# Patient Record
Sex: FEMALE | Race: WHITE | NOT HISPANIC OR LATINO | Employment: OTHER | URBAN - METROPOLITAN AREA
[De-identification: names, ages, dates, MRNs, and addresses within clinical notes are randomized per-mention and may not be internally consistent; named-entity substitution may affect disease eponyms.]

---

## 2017-05-17 ENCOUNTER — CONVERSION ENCOUNTER (OUTPATIENT)
Dept: MAMMOGRAPHY | Facility: CLINIC | Age: 62
End: 2017-05-17

## 2018-09-04 PROBLEM — I82.5Z1 CHRONIC DEEP VEIN THROMBOSIS (DVT) OF DISTAL VEIN OF RIGHT LOWER EXTREMITY (HCC): Status: ACTIVE | Noted: 2018-09-04

## 2018-09-04 RX ORDER — ASPIRIN 325 MG
1 TABLET ORAL DAILY
COMMUNITY
Start: 2016-06-06 | End: 2018-09-05

## 2018-09-04 RX ORDER — CARISOPRODOL 350 MG/1
TABLET ORAL
COMMUNITY
End: 2018-09-05

## 2018-09-04 RX ORDER — METHADONE HYDROCHLORIDE 10 MG/1
TABLET ORAL EVERY 6 HOURS
COMMUNITY
End: 2018-09-05

## 2018-09-04 RX ORDER — HYDROMORPHONE HYDROCHLORIDE 4 MG/1
TABLET ORAL
COMMUNITY

## 2018-09-05 ENCOUNTER — OFFICE VISIT (OUTPATIENT)
Dept: FAMILY MEDICINE CLINIC | Facility: CLINIC | Age: 63
End: 2018-09-05
Payer: COMMERCIAL

## 2018-09-05 VITALS
HEART RATE: 87 BPM | SYSTOLIC BLOOD PRESSURE: 118 MMHG | BODY MASS INDEX: 22.66 KG/M2 | OXYGEN SATURATION: 92 % | RESPIRATION RATE: 12 BRPM | WEIGHT: 141 LBS | HEIGHT: 66 IN | DIASTOLIC BLOOD PRESSURE: 80 MMHG | TEMPERATURE: 96.2 F

## 2018-09-05 DIAGNOSIS — Z00.00 ANNUAL PHYSICAL EXAM: Primary | ICD-10-CM

## 2018-09-05 DIAGNOSIS — Z96.643 HISTORY OF TOTAL REPLACEMENT OF BOTH HIP JOINTS: ICD-10-CM

## 2018-09-05 DIAGNOSIS — N63.0 BREAST LUMP IN LOWER OUTER QUADRANT: ICD-10-CM

## 2018-09-05 PROCEDURE — 99396 PREV VISIT EST AGE 40-64: CPT | Performed by: FAMILY MEDICINE

## 2018-09-05 RX ORDER — ASPIRIN 81 MG/1
81 TABLET, CHEWABLE ORAL DAILY
COMMUNITY
End: 2018-11-02 | Stop reason: ALTCHOICE

## 2018-09-05 RX ORDER — MORPHINE SULFATE 30 MG/1
30 TABLET ORAL EVERY 8 HOURS PRN
COMMUNITY
End: 2019-01-31 | Stop reason: CLARIF

## 2018-09-05 NOTE — PROGRESS NOTES
Assessment/Plan:          Diagnoses and all orders for this visit:    Annual physical exam  Comments:  per patient, Will will authorize shingles vaccine if PCP calls, Will will authorize cologuard if PCP calls  refuses repeat colonoscopy and pap smear  Orders:  -     Comprehensive metabolic panel; Future  -     Lipid panel; Future  -     TSH, 3rd generation with Free T4 reflex; Future  -     CBC and differential; Future    Breast lump in lower outer quadrant  Comments:  mother had breast cancer, sister negative for the gene  Orders:  -     Mammo screening bilateral w cad; Future  -     US breast left limited (diagnostic); Future    History of total replacement of both hip joints  Comments:  both done in late 2016  f/u [facility]    Other orders  -     Discontinue: aspirin 325 mg tablet; Take 1 tablet by mouth daily  -     HYDROmorphone (DILAUDID) 4 mg tablet; take 1 tablet (4MG)  by oral route  every 4 - 6 hours as needed  -     Discontinue: methadone (DOLOPHINE) 10 mg tablet; every 6 (six) hours,  -     Discontinue: carisoprodol (SOMA) 350 mg tablet; take 1 tablet (350MG)  by oral route  every bedtime and at bedtime  -     morphine (MSIR) 30 MG tablet; Take 30 mg by mouth every 8 (eight) hours as needed  -     aspirin 81 mg chewable tablet; Chew 81 mg daily          Subjective:      Patient ID: Josleito Mahoney is a 61 y o  female  Pain underneath left breast in rib area for the past one week  Mother had breast cancer  Sister took the gene test and it was negative  Had bilateral hip replacements in late 2016  Finished PT  Still having problems standing  Can walk pretty well  Has been using friend's pool as aqua therapy  Home aide comes 4 times per week from [facility]  Still goes to [facility] for follow-up care  [place] would authorize shingles vaccine if I called and they send it to [facility] so it can be administered here  Had colonoscopy years ago   Does not want another colonoscopy  She mentioned cologuard which she told me [place] would authorize  Does not want pap smear  Has not been sexually active in over 15 years        The following portions of the patient's history were reviewed and updated as appropriate: allergies, current medications, past family history, past medical history, past social history, past surgical history and problem list     Review of Systems   Constitutional: Negative for chills, fatigue, fever and unexpected weight change  HENT: Negative for congestion, ear pain, rhinorrhea and sore throat  Eyes: Negative for pain and visual disturbance  Respiratory: Negative for cough, chest tightness and shortness of breath  Cardiovascular: Negative for chest pain, palpitations and leg swelling  Gastrointestinal: Negative for abdominal pain, constipation, diarrhea, nausea and vomiting  Genitourinary: Negative for difficulty urinating, dysuria and urgency  Left lower breast tenderness   Musculoskeletal: Positive for arthralgias, back pain, gait problem and joint swelling  Skin: Negative for rash  Neurological: Negative for dizziness, numbness and headaches  Psychiatric/Behavioral: Negative for behavioral problems and suicidal ideas  The patient is not nervous/anxious  Objective:      /80 (BP Location: Right arm, Patient Position: Sitting, Cuff Size: Standard)   Pulse 87   Temp (!) 96 2 °F (35 7 °C) (Temporal)   Resp 12   Ht 5' 6" (1 676 m)   Wt 64 kg (141 lb)   SpO2 92%   BMI 22 76 kg/m²          Physical Exam   Constitutional: She is oriented to person, place, and time  She appears well-developed and well-nourished  HENT:   Head: Normocephalic and atraumatic  Right Ear: External ear normal    Left Ear: External ear normal    Nose: Nose normal    Mouth/Throat: Oropharynx is clear and moist    Eyes: Conjunctivae and EOM are normal  Pupils are equal, round, and reactive to light  Neck: Normal range of motion  Neck supple  Cardiovascular: Normal rate, regular rhythm, normal heart sounds and intact distal pulses  Pulmonary/Chest: Effort normal and breath sounds normal    Abdominal: Soft  Bowel sounds are normal    Genitourinary: There is breast tenderness  Genitourinary Comments: Left lower outer quadrant   Musculoskeletal:        Right knee: She exhibits decreased range of motion  Tenderness found  Left knee: She exhibits decreased range of motion  Tenderness found  Lumbar back: She exhibits decreased range of motion, tenderness, deformity and spasm  Back:    Neurological: She is alert and oriented to person, place, and time  Skin: Skin is warm  Psychiatric: She has a normal mood and affect   Her behavior is normal

## 2018-09-06 ENCOUNTER — TELEPHONE (OUTPATIENT)
Dept: FAMILY MEDICINE CLINIC | Facility: CLINIC | Age: 63
End: 2018-09-06

## 2018-09-06 ENCOUNTER — TRANSCRIBE ORDERS (OUTPATIENT)
Dept: ADMINISTRATIVE | Facility: HOSPITAL | Age: 63
End: 2018-09-06

## 2018-09-06 DIAGNOSIS — N64.4 BREAST TENDERNESS: Primary | ICD-10-CM

## 2018-09-06 DIAGNOSIS — N63.0 LUMP OR MASS IN BREAST: Primary | ICD-10-CM

## 2018-09-13 ENCOUNTER — HOSPITAL ENCOUNTER (OUTPATIENT)
Dept: MAMMOGRAPHY | Facility: CLINIC | Age: 63
Discharge: HOME | End: 2018-09-13
Payer: COMMERCIAL

## 2018-09-13 ENCOUNTER — HOSPITAL ENCOUNTER (OUTPATIENT)
Dept: ULTRASOUND IMAGING | Facility: HOSPITAL | Age: 63
Discharge: HOME | End: 2018-09-13
Attending: FAMILY MEDICINE
Payer: COMMERCIAL

## 2018-09-13 ENCOUNTER — TELEPHONE (OUTPATIENT)
Dept: FAMILY MEDICINE CLINIC | Facility: CLINIC | Age: 63
End: 2018-09-13

## 2018-09-13 DIAGNOSIS — N63.0 LUMP OR MASS IN BREAST: ICD-10-CM

## 2018-09-13 DIAGNOSIS — N63.0 BREAST LUMP IN LOWER OUTER QUADRANT: ICD-10-CM

## 2018-09-13 PROCEDURE — 77066 DX MAMMO INCL CAD BI: CPT

## 2018-09-13 PROCEDURE — 76642 ULTRASOUND BREAST LIMITED: CPT

## 2018-09-13 NOTE — PROGRESS NOTES
Dr En Angela met with pt at [facility] regarding recommendation for;      _____ RIGHT ___x___LEFT      __x___Ultrasound guided  ______Stereotactic  Breast biopsy  __x___Verbalized understanding  Blood thinners:  __x___yes _____no   (ASA 81 mg)    Date stopped: ____n/a_______    Biopsy teaching sheet reviewed:  ____x___yes ______no    Chucho Thoreau to pt via phone and reviewed biopsy information teaching  Pt verbalized understanding of such and all questions answered at this time

## 2018-09-19 ENCOUNTER — HOSPITAL ENCOUNTER (OUTPATIENT)
Dept: MAMMOGRAPHY | Facility: CLINIC | Age: 63
Discharge: HOME | End: 2018-09-19

## 2018-09-19 ENCOUNTER — HOSPITAL ENCOUNTER (OUTPATIENT)
Dept: ULTRASOUND IMAGING | Facility: CLINIC | Age: 63
Discharge: HOME | End: 2018-09-19
Payer: COMMERCIAL

## 2018-09-19 ENCOUNTER — HOSPITAL ENCOUNTER (OUTPATIENT)
Dept: ULTRASOUND IMAGING | Facility: CLINIC | Age: 63
Discharge: HOME | End: 2018-09-19
Admitting: RADIOLOGY
Payer: COMMERCIAL

## 2018-09-19 VITALS — HEART RATE: 88 BPM | DIASTOLIC BLOOD PRESSURE: 92 MMHG | SYSTOLIC BLOOD PRESSURE: 158 MMHG

## 2018-09-19 DIAGNOSIS — R92.8 ABNORMAL MAMMOGRAM: ICD-10-CM

## 2018-09-19 PROCEDURE — 88341 IMHCHEM/IMCYTCHM EA ADD ANTB: CPT | Performed by: PATHOLOGY

## 2018-09-19 PROCEDURE — 19083 BX BREAST 1ST LESION US IMAG: CPT

## 2018-09-19 PROCEDURE — 88305 TISSUE EXAM BY PATHOLOGIST: CPT | Performed by: PATHOLOGY

## 2018-09-19 PROCEDURE — 88342 IMHCHEM/IMCYTCHM 1ST ANTB: CPT | Performed by: PATHOLOGY

## 2018-09-19 PROCEDURE — 19084 BX BREAST ADD LESION US IMAG: CPT

## 2018-09-19 PROCEDURE — 88361 TUMOR IMMUNOHISTOCHEM/COMPUT: CPT | Performed by: PATHOLOGY

## 2018-09-19 RX ORDER — LIDOCAINE HYDROCHLORIDE 10 MG/ML
5 INJECTION, SOLUTION INFILTRATION; PERINEURAL ONCE
Status: COMPLETED | OUTPATIENT
Start: 2018-09-19 | End: 2018-09-19

## 2018-09-19 RX ORDER — LIDOCAINE HYDROCHLORIDE 10 MG/ML
5 INJECTION, SOLUTION EPIDURAL; INFILTRATION; INTRACAUDAL; PERINEURAL ONCE
Status: COMPLETED | OUTPATIENT
Start: 2018-09-19 | End: 2018-09-19

## 2018-09-19 RX ADMIN — LIDOCAINE HYDROCHLORIDE 5 ML: 10 INJECTION, SOLUTION INFILTRATION; PERINEURAL at 12:21

## 2018-09-19 RX ADMIN — LIDOCAINE HYDROCHLORIDE 5 ML: 10 INJECTION, SOLUTION EPIDURAL; INFILTRATION; INTRACAUDAL; PERINEURAL at 12:45

## 2018-09-19 RX ADMIN — LIDOCAINE HYDROCHLORIDE 5 ML: 10 INJECTION, SOLUTION EPIDURAL; INFILTRATION; INTRACAUDAL; PERINEURAL at 12:34

## 2018-09-19 RX ADMIN — LIDOCAINE HYDROCHLORIDE 5 ML: 10 INJECTION, SOLUTION INFILTRATION; PERINEURAL at 12:34

## 2018-09-19 NOTE — DISCHARGE INSTR - OTHER ORDERS
POST LARGE CORE BREAST BIOPSY PATIENT INFORMATION      1  Place an ice pack inside your bra over the top of the dressing every hour for 20 minutes (20 minutes on, 60 minutes off)  Do this until bedtime  2  Do not shower or bathe until the following morning  3  You may bathe your breast carefully with the steri-strips in place  Be careful    Not to loosen them  The steri-strips will fall off in 3-5 days  4  You may have mild discomfort, and you may have some bruising where the   Needle entered the skin  This should clear within 5-7 days  5  If you need medicine for discomfort, take acetaminophen products such as   Tylenol  You may also take Advil or Motrin products  6  Do not participate in strenuous activities such as-tennis, aerobics, skiing,  Weight lifting, etc  for 24 hours  Refrain from swimming/soaking for 72 hours  7  Wearing a bra for sleeping may be more comfortable for the first 24-48 hours  8  Watch for continued bleeding, pain or fever over 101; please call with any questions or concerns  For procedures done at the [facility]  [facility] [address] call:  Griffin Lopes RN at [phone]  Zoltan Lujan RN at [phone]                    *After 4 PM call the Interventional Radiology Department                    [phone] and ask to speak with the nurse on call  For procedures done at the [address] [place] [address] call:         Taylor Barthel RN at   *After 4 PM call the Interventional Radiology Department   [phone] and ask to speak with the nurse on call  For procedures done at [address] [place] [address] call: The Radiology Nurse at [phone]  *After 4 PM call your physician, or go to the Emergency Department  9          The final results of your biopsy are usually available within one week

## 2018-09-19 NOTE — PROGRESS NOTES
Procedure type: (1st site)    __x___ultrasound guided _____stereotactic    Breast:    __x___Left _____Right    Location: 4:00 6m from nipple    Needle: 12g Marquee    # of passes: 4 (specimen A)    Clip: [place]    Performed by: Dr Trever Rojo held for 5 minutes by: Dr Trevin Middleton Strips:    __x___yes _____no    Band aid:    __x___yes_____no    Tape and guaze:    _____yes __x___no    Tolerated procedure:    __x___yes _____no (pt had pain during 1st site; Dr Oswald Medina gave pt more lidocaine and pt tolerated remainder of 1st site well)      Procedure type: (2nd site)    __x___ultrasound guided _____stereotactic    Breast:    __x___Left _____Right    Location: 5:00 5cm from nipple    Needle: 12g Marquee    # of passes: 4 (specimen B)    Clip: Celeromark    Performed by: by Dr Trever Rojo held for 5 minutes by: Dr Trevin Middleton Strips:    __x___yes _____no    Band aid:    __x___yes_____no    Tape and guaze:    _____yes __x___no    Tolerated procedure:    __x___yes _____no (pt had pain during 2nd site; Dr Oswald Medina gave pt more lidocaine and pt tolerated remainder of 2nd site well)      Procedure type: (3rd site)    __x___ultrasound guided _____stereotactic    Breast:    __x___Left _____Right    Location: 6:00 6cm from nipple    Needle: 12g Marquee    # of passes: 4 (specimen C)    Clip: Ultraclip-ribbon    Performed by: Dr Trever Rojo held for 5 minutes by: Dr Trevin Middleton Strips:    __x___yes _____no    Band aid:    __x___yes_____no    Tape and guaze:    _____yes __x___no    Tolerated procedure:    __x___yes _____no

## 2018-09-19 NOTE — PROGRESS NOTES
Ice pack given:    __x___yes _____no    Discharge instructions signed by patient:    __x___yes _____no    Discharge instructions given to patient:    ___x__yes _____no    Discharged via:    __x___amulatory (with walker)    _____wheelchair    _____stretcher    Stable on discharge:    __x___yes ____no

## 2018-09-19 NOTE — PROGRESS NOTES
Patient arrived via:    __x___ambulatory (with walker)    _____wheelchair    _____stretcher      Domestic violence screen    __x____negative______positive    Breast Implants:    _______yes ____x____no

## 2018-09-21 ENCOUNTER — TELEPHONE (OUTPATIENT)
Dept: MAMMOGRAPHY | Facility: CLINIC | Age: 63
End: 2018-09-21

## 2018-10-17 ENCOUNTER — TRANSCRIBE ORDERS (OUTPATIENT)
Dept: ADMINISTRATIVE | Facility: HOSPITAL | Age: 63
End: 2018-10-17

## 2018-10-17 DIAGNOSIS — C50.912 MALIGNANT NEOPLASM OF LEFT FEMALE BREAST, UNSPECIFIED ESTROGEN RECEPTOR STATUS, UNSPECIFIED SITE OF BREAST (HCC): Primary | ICD-10-CM

## 2018-10-22 PROBLEM — Z17.0 MALIGNANT NEOPLASM OF LEFT BREAST IN FEMALE, ESTROGEN RECEPTOR POSITIVE (HCC): Status: ACTIVE | Noted: 2018-09-19

## 2018-10-22 PROBLEM — C50.912 MALIGNANT NEOPLASM OF LEFT BREAST IN FEMALE, ESTROGEN RECEPTOR POSITIVE (HCC): Status: ACTIVE | Noted: 2018-09-19

## 2018-10-24 ENCOUNTER — HOSPITAL ENCOUNTER (OUTPATIENT)
Dept: NUCLEAR MEDICINE | Facility: HOSPITAL | Age: 63
Discharge: HOME | End: 2018-10-24
Attending: SURGERY
Payer: COMMERCIAL

## 2018-10-24 DIAGNOSIS — C50.912 MALIGNANT NEOPLASM OF LEFT FEMALE BREAST, UNSPECIFIED ESTROGEN RECEPTOR STATUS, UNSPECIFIED SITE OF BREAST (HCC): ICD-10-CM

## 2018-10-24 LAB — GLUCOSE SERPL-MCNC: 95 MG/DL (ref 70–99)

## 2018-10-24 PROCEDURE — 82948 REAGENT STRIP/BLOOD GLUCOSE: CPT

## 2018-10-24 PROCEDURE — A9552 F18 FDG: HCPCS

## 2018-10-24 PROCEDURE — 78815 PET IMAGE W/CT SKULL-THIGH: CPT

## 2018-11-02 ENCOUNTER — CLINICAL SUPPORT (OUTPATIENT)
Dept: RADIATION ONCOLOGY | Facility: CLINIC | Age: 63
End: 2018-11-02
Payer: COMMERCIAL

## 2018-11-02 ENCOUNTER — RADIATION ONCOLOGY CONSULT (OUTPATIENT)
Dept: RADIATION ONCOLOGY | Facility: CLINIC | Age: 63
End: 2018-11-02
Attending: RADIOLOGY
Payer: COMMERCIAL

## 2018-11-02 VITALS
TEMPERATURE: 98.7 F | OXYGEN SATURATION: 97 % | HEIGHT: 66 IN | BODY MASS INDEX: 22.79 KG/M2 | HEART RATE: 73 BPM | DIASTOLIC BLOOD PRESSURE: 84 MMHG | RESPIRATION RATE: 16 BRPM | WEIGHT: 141.8 LBS | SYSTOLIC BLOOD PRESSURE: 140 MMHG

## 2018-11-02 DIAGNOSIS — Z17.0 MALIGNANT NEOPLASM OF LEFT BREAST IN FEMALE, ESTROGEN RECEPTOR POSITIVE, UNSPECIFIED SITE OF BREAST (HCC): Primary | ICD-10-CM

## 2018-11-02 DIAGNOSIS — C50.912 MALIGNANT NEOPLASM OF LEFT BREAST IN FEMALE, ESTROGEN RECEPTOR POSITIVE, UNSPECIFIED SITE OF BREAST (HCC): Primary | ICD-10-CM

## 2018-11-02 PROCEDURE — 99215 OFFICE O/P EST HI 40 MIN: CPT | Performed by: RADIOLOGY

## 2018-11-02 RX ORDER — LORAZEPAM 0.5 MG/1
0.5 TABLET ORAL EVERY 8 HOURS PRN
COMMUNITY
End: 2019-01-28 | Stop reason: DRUGHIGH

## 2018-11-02 RX ORDER — ASPIRIN 325 MG
325 TABLET ORAL AS NEEDED
COMMUNITY

## 2018-11-02 NOTE — PROGRESS NOTES
Consultation - Radiation Oncology     XOC:[id number] : 1955  Encounter: [id number]  Patient Information: Viviana Sherwood    Stage IIIA invasive left breast carcinoma  CHIEF COMPLAINT  Chief Complaint   Patient presents with    Breast Cancer    Consult     Cancer Staging  No matching staging information was found for the patient  History of Present Illness   Viviana Sherwood is a 61y o  year old female who presents with a history feeling a mass in her left breast    Bilateral diagnostic mammogram did not show an underlying mass and the ultrasound revealed small hypoechoic area suspicious enough to warrant ultrasound-guided biopsy  Ultrasound-guided biopsy was performed  from 4, 5 and 6 o'clock all revealing of invasive lobular carcinoma  Patient underwent on  left-sided nipple sparing mastectomy, sentinel lymph node biopsy and axillary lymph node dissection  Final pathologic diagnosis was a grade 1 invasive lobular carcinoma, classic type, tumor size greater than 5 cm and was at or within 1 mm of the soft tissue margins in the upper outer and lower outer margins  There was extensive LCIS  Lymphovascular invasion was seen  3/13 lymph nodes positive for metastatic carcinoma  These were macrometastases more than 2 mm  ER more than 90% positive, [place] 90% positive and HER2 Brennan negative  She had a chest wall expander for reconstruction later  Historical Information      Malignant neoplasm of left breast in female, estrogen receptor positive ([facility] [address] )    2018 Initial Diagnosis     Malignant neoplasm of left breast in female, estrogen receptor positive ([facility] [address] )       2018 Biopsy     Left breast biopsy x3:  Invasive lobular carcinoma, grade 1/3  ER >90%  W[place] 90%  HER2 negative         10/9/2018 Surgery     Left sided nipple sparing total mastectomy  Left sided sentinel node mapping and deep biopsy with completion total axillary lymph node dissection      A   Left breast, mastectomy:  - Infiltrating lobular carcinoma, classic type, grade 1/3  - Tumor size:  Estimated to be greater than 5 cm in greatest dimension  - Tumor is seen at or within 1 mm of soft tissue margins, particularly upper outer and lower outer margins   - Extensive lobular carcinoma in situ is also seen  - Lymphatic/vascular invasion is seen  - See synoptic report for further details     B  Left axillary lymph nodes:  - Nine lymph nodes identified showing no metastatic tumor      A  [place] lymph node #1:  - Metastatic carcinoma identified in 2 of 4 lymph nodes with metastatic foci greater 2 mm and without extracapsular extension     B  [place] lymph node #2:  - One lymph node identified showing metastatic carcinoma the metastasis being greater than 2 mm and without extracapsular extension    C  Retroareolar tissue:    - Benign breast tissue showing no atypia or malignancy    - Dr Zoe Snellen  - Dr Winnie Reilly         10/9/2018 -  Cancer Staged     Stage IIIA - pT3, pN1a, cM0, G1                  Past Medical History:   Diagnosis Date    Breast cancer ([facility] [address] )     DJD (degenerative joint disease)     History of blood clots     Osteoarthritis     left hip    Osteoarthritis     Pain      Past Surgical History:   Procedure Laterality Date    OVARY SURGERY      TOTAL HIP ARTHROPLASTY      US GUIDANCE BREAST BIOPSY LEFT EACH ADDITIONAL Left 9/19/2018    US GUIDANCE BREAST BIOPSY LEFT EACH ADDITIONAL Left 9/19/2018    US GUIDED BREAST BIOPSY LEFT COMPLETE Left 9/19/2018       Family History   Problem Relation Age of Onset    Breast cancer Mother 43    Lymphoma Mother     Breast cancer Maternal Aunt        Social History   History   Alcohol Use No     History   Drug Use No     History   Smoking Status    Never Smoker   Smokeless Tobacco    Never Used         Meds/Allergies     Current Outpatient Prescriptions:     aspirin 325 mg tablet, Take 325 mg by mouth as needed for mild pain, Disp: , Rfl:     HYDROmorphone (DILAUDID) 4 mg tablet, take 1 tablet (4MG)  by oral route  every 4 - 6 hours as needed, Disp: , Rfl:     LORazepam (ATIVAN) 0 5 mg tablet, Take 0 5 mg by mouth every 8 (eight) hours as needed for anxiety, Disp: , Rfl:     morphine (MSIR) 30 MG tablet, Take 30 mg by mouth every 8 (eight) hours as needed, Disp: , Rfl:   Allergies   Allergen Reactions    Acetaminophen      Other reaction(s): temp paralysis, red flushing ski    Clindamycin GI Intolerance    Ibuprofen     Lyrica [Pregabalin]     Penicillins Other (See Comments)    Prochlorperazine Other (See Comments)    Neurontin [Gabapentin] Rash         Review of Systems    Constitutional: Negative for activity change, appetite change, chills, diaphoresis, fatigue, fever and unexpected weight change  HENT: Negative for congestion, dental problem, drooling, ear discharge, ear pain, facial swelling, hearing loss, mouth sores, nosebleeds, postnasal drip, rhinorrhea, sinus pressure, sneezing, sore throat, tinnitus, trouble swallowing and voice change  Eyes: Negative for photophobia, pain, discharge, redness, itching and visual disturbance  Respiratory: Negative for apnea, cough, choking, chest tightness, shortness of breath, wheezing and stridor  Cardiovascular: Negative for chest pain and palpitations  Gastrointestinal: Negative for abdominal distention, abdominal pain, anal bleeding, blood in stool, constipation, diarrhea, nausea, rectal pain and vomiting  Endocrine: Negative for cold intolerance, heat intolerance, polydipsia, polyphagia and polyuria  Genitourinary: Negative for decreased urine volume, difficulty urinating, dyspareunia, dysuria, enuresis, flank pain, frequency, genital sores, hematuria, menstrual problem, pelvic pain, urgency, vaginal bleeding and vaginal discharge  Musculoskeletal: Negative for arthralgias, back pain, gait problem, joint swelling, myalgias, neck pain and neck stiffness    pain and restriction raising left arm  Skin: Negative for color change, pallor, rash and wound  Allergic/Immunologic: Negative for environmental allergies, food allergies and immunocompromised state  Neurological: Negative for dizziness, tremors, seizures, syncope, facial asymmetry, speech difficulty, weakness, light-headedness, numbness and headaches  Hematological: Negative for adenopathy  Does not bruise/bleed easily  Psychiatric/Behavioral: Negative for agitation, behavioral problems, confusion, decreased concentration, dysphoric mood, hallucinations, self-injury, sleep disturbance and suicidal ideas  The patient is not nervous/anxious and is not hyperactive  OBJECTIVE:   /84   Pulse 73   Temp 98 7 °F (37 1 °C)   Resp 16   Ht 5' 6" (1 676 m)   Wt 64 3 kg (141 lb 12 8 oz)   SpO2 97%   BMI 22 89 kg/m²   Pain Assessment:  3  Performance Status: ECOG/Zubrod/WHO: 1 - Symptomatic but completely ambulatory    Physical Exam Patient appears and looks her stated age, somewhat anxious but cooperative without any acute complaints  There are no palpable nodes  Lungs are clear  Heart tones are normal with sinus rhythm  No abdominal masses  Right breast is normal to examination  The left chest wall looks normal expander feels tight and  with some restriction in lifting the left arm  There is no edema of the left arm        RESULTS  Lab Results    Chemistry    No results found for: NA, K, CL, CO2, BUN, CREATININE No results found for: CALCIUM, ALKPHOS, AST, ALT, BILITOT         No results found for: WBC, HGB, HCT, MCV, PLT      Imaging Studies  Nm Pet Ct Skull Base To Mid Thigh    Result Date: 10/24/2018  Narrative: PET/CT SCAN INDICATION:  C50 912: Malignant neoplasm of unspecified site of left female breast   newly diagnosed breast cancer, left mastectomy 10/9/2018, 2 drains in left chest, staging for treatment management MODIFIER: PI COMPARISON: CT chest 7/25/2016 CELL TYPE:  Invasive lobular carcinoma TECHNIQUE:   11 159 mCi F-18-FDG administered IV  Multiplanar attenuation corrected and non attenuation corrected PET images are available for interpretation, and contiguous, low dose, axial CT sections were obtained from the skull base through the femurs   Intravenous contrast material was not utilized  This examination, like all CT scans performed in the [facility], was performed utilizing techniques to minimize radiation dose exposure, including the use of iterative reconstruction and automated exposure control  Fasting serum glucose: 95 mg/dl FINDINGS: VISUALIZED BRAIN:   No acute abnormalities are seen  HEAD/NECK:   There is a physiologic distribution of FDG  Small subcentimeter 6 mm node posterior to the right submandibular gland with mild FDG activity, SUV 2, is likely reactive  Otherwise no pathologic FDG avid cervical adenopathy is seen  CT images: Unremarkable  CHEST:   Postsurgical changes in the anterior left chest wall and left axilla  There is associated FDG activity, SUV 2 9, likely postsurgical inflammatory changes  No additional FDG avid soft tissue lesions that are concerning for malignancy/metastases  No hypermetabolic axillary, hilar or mediastinal adenopathy  CT images: Stable small nodular scarring in the right upper and middle lung  Left lung base granuloma  ABDOMEN:   No FDG avid soft tissue lesions are seen  Bowel activity is likely physiologic  CT images: Unremarkable  PELVIS: No FDG avid soft tissue lesions are seen  CT images: Streak artifact from bilateral hip hardware  OSSEOUS STRUCTURES: No FDG avid lesions are seen  CT images: No significant findings  Spine degenerative change and dextroscoliosis  Impression: 1  Postsurgical inflammatory changes in the left anterior chest wall and left axilla  2   No additional FDG avid soft tissue lesions that are concerning for malignancy/metastases   Workstation performed: [id number]         Pathology: pT3 (more than 5 cm), pN1a (3/13 lymph nodes positive for metastatic carcinoma), cM0 (PET-CT scan negative), stage IIIa, ERPR positive, HER2 Brennan negative and close margin less than 1 mm  ASSESSMENT  No diagnosis found  Cancer Staging  No matching staging information was found for the patient  Stage IIIA invasive lobular carcinoma left breast    PLAN/DISCUSSION  No orders of the defined types were placed in this encounter  Adjuvant chemotherapy, hormone therapy and radiation therapy  Marcelino Judd is a 61y o  year old female with stage IIIA grade 1 invasive lobular carcinoma left breast status post nipple sparing mastectomy and sentinel lymph node biopsy with axillary lymph node dissection  There is a close margin in the upper outer and lower outer soft tissue  Discussed with patient most likely she will receive adjuvant chemotherapy details will be provided by Dr Jose Resendiz  Also following chemotherapy adjuvant hormone therapy  Explained to patient and  chemotherapy will start first usually 3-4 months with adjuvant radiation therapy to follow which will be a treatment course of 33 treatments total dose 60 4 Gy (50 4 Gy to the entire left chest wall and boost with electrons for additional 10 Gy)  We inform her of the side effects of fatigue, skin reaction and rarely late sequela of swelling edema of the left arm  She will be seeing physical therapy for management of the restriction in her left arm  Fabian Garay MD  11/2/2018,4:13 PM      Portions of the record may have been created with voice recognition software   Occasional wrong word or "sound a like" substitutions may have occurred due to the inherent limitations of voice recognition software   Read the chart carefully and recognize, using context, where substitutions have occurred

## 2018-11-02 NOTE — PROGRESS NOTES
Helena Bell  1955  Ms Brooklynn Cantor is a 61 y o  female    Chief Complaint   Patient presents with   [facility]    Consult     61 y o female with recently diagnosed invasive lobular carcinoma of the left breast  Referred by Dr Maripsoa Giron  Family history of breast cancer in mother at age 43  GYN HX:  Menarche age 6  First birth at age 40  Postmenopausal    She presented with palpable lump in her left breast     9/13/18 Diagnostic bilateral mammogram w/CAD and Left breast US:   - suspicious abnormality in the left breast, BiRad 4  Recommendation: Ultrasound-guided biopsy of the left breast  Follow-up diagnostic mammogram and ultrasound of the left breast  in 6 months  9/19/18 Left breast biopsy x3 sites    9/26/18 Surgical consultation with Dr Griselda Rodgers  She recommended mastectomy due to multifocal nature of her disease  Patient was interested in reconstruction  On 10/1/18, she met with Dr Peter Andersen, plastic surgeon, implant based reconstruction was recommended  10/9/18 Left sided nipple sparing mastectomy, SLNB and axillary lymph node dissection  Her post op course was complicated by a hematoma  She returned to the operating room on 10/16/18 for surgical evacuation  10/17/18 Post op visit with surgeons  She has ecchymosis consistent with her hematoma, drains are in place and functioning well, wound is healing  Refer to medical and radiation oncology  10/24/18 PET CT  IMPRESSION:  1  Postsurgical inflammatory changes in the left anterior chest wall and left axilla  2   No additional FDG avid soft tissue lesions that are concerning for malignancy/metastases    11/14/18 MED ONC: Dr Geraldo Adam consult  Future:  Dr Peter Andersen f/u  Dr Mariposa Giron f/u    Cancer Staging  No matching staging information was found for the patient      Oncology History    61 y o female with recently diagnosed invasive lobular carcinoma of the left breast  Referred by Dr Pro Juarez Alba  Family history of breast cancer in mother at age 43  GYN HX:  Menarche age 6  First birth at age 40  Postmenopausal    She presented with palpable lump in her left breast     9/13/18 Diagnostic bilateral mammogram w/CAD and Left breast US:   - suspicious abnormality in the left breast, BiRad 4  Recommendation: Ultrasound-guided biopsy of the left breast  Follow-up diagnostic mammogram and ultrasound of the left breast  in 6 months  9/19/18 Left breast biopsy x3 sites    9/26/18 Surgical consultation with Dr Catie Sales  She recommended mastectomy due to multifocal nature of her disease  Patient was interested in reconstruction  On 10/1/18, she met with Dr Neville Bobo, plastic surgeon, implant based reconstruction was recommended  10/9/18 Left sided nipple sparing mastectomy, SLNB and axillary lymph node dissection  Her post op course was complicated by a hematoma  She returned to the operating room on 10/16/18 for surgical evacuation  10/17/18 Post op visit with surgeons  She has ecchymosis consistent with her hematoma, drains are in place and functioning well, wound is healing  Refer to medical and radiation oncology  10/24/18 PET CT  IMPRESSION:  1  Postsurgical inflammatory changes in the left anterior chest wall and left axilla  2   No additional FDG avid soft tissue lesions that are concerning for malignancy/metastases    MED ONC ?? Future:  Dr Neville Bobo f/u  Dr Rafael Nice f/u          Malignant neoplasm of left breast in female, estrogen receptor positive ([facility] [address] )    9/19/2018 Initial Diagnosis     Malignant neoplasm of left breast in female, estrogen receptor positive ([facility] [address] )       9/19/2018 Biopsy     Left breast biopsy x3:  Invasive lobular carcinoma, grade 1/3  ER >90%  NC 90%  HER2 negative         10/9/2018 Surgery     Left sided nipple sparing total mastectomy   Left sided sentinel node mapping and deep biopsy with completion total axillary lymph node dissection  A   Left breast, mastectomy:  - Infiltrating lobular carcinoma, classic type, grade 1/3  - Tumor size:  Estimated to be greater than 5 cm in greatest dimension  - Tumor is seen at or within 1 mm of soft tissue margins, particularly upper outer and lower outer margins   - Extensive lobular carcinoma in situ is also seen  - Lymphatic/vascular invasion is seen  - See synoptic report for further details     B  Left axillary lymph nodes:  - Nine lymph nodes identified showing no metastatic tumor      A  [place] lymph node #1:  - Metastatic carcinoma identified in 2 of 4 lymph nodes with metastatic foci greater 2 mm and without extracapsular extension     B  [place] lymph node #2:  - One lymph node identified showing metastatic carcinoma the metastasis being greater than 2 mm and without extracapsular extension    C  Retroareolar tissue:    - Benign breast tissue showing no atypia or malignancy    - Dr Lita Stone  - Dr Jose Godfrey         10/9/2018 -  Cancer Staged     Stage IIIA - pT3, pN1a, cM0, G1  Clinical Trial: no    Screening  Tobacco  Current tobacco user: no  If yes, brief counseling provided: NA    Hypertension  Hypertension screening performed: yes  Normotensive:  yes  If no, referred to PCP: n/a    Depression Screening  Screened for depression using PHQ-2: yes    Screened for depression using PHQ-9:  no  Screening positive or negative:  negative  If score >4, was any of the following actions taken? Additional evaluation for depression, suicide risk assesment, referral to PCP or psychiatry, medication started:  n/a    Advanced Care Planning for Patients >65 years  Advanced Care Planning Discussed:  no  Patient named surrogate decision maker or care plan in chart: yes    OB/GYN History:  The patient underwent menarche at 8 years  Menopause Status Pre, Antonietta, Post, Unknown and No Answer  No LMP recorded  Patient is postmenopausal   Menopause at 47 years    Menopause Reason: natural  Hormone replacement therapy: no   Years used   2   Para 1   Age at first delivery being 40 years  Nursing: no    Birth control pills: no   Years used      Health Maintenance   Topic Date Due    Depression Screening PHQ  1955    CRC Screening: Colonoscopy  1955    Pneumococcal PPSV23 Highest Risk Adult (2 of 3 - PCV13) 10/03/2017    INFLUENZA VACCINE  2018    DTaP,Tdap,and Td Vaccines (2 - Td) 10/28/2023       Patient Active Problem List   Diagnosis    History of total hip arthroplasty    Chronic deep vein thrombosis (DVT) of distal vein of right lower extremity (HCC)    Malignant neoplasm of left breast in female, estrogen receptor positive ([facility] [address] )     Past Medical History:   Diagnosis Date    Breast cancer ([facility] [address] )     DJD (degenerative joint disease)     History of blood clots     Osteoarthritis     left hip    Osteoarthritis     Pain      Past Surgical History:   Procedure Laterality Date    OVARY SURGERY      TOTAL HIP ARTHROPLASTY      US GUIDANCE BREAST BIOPSY LEFT EACH ADDITIONAL Left 2018    US GUIDANCE BREAST BIOPSY LEFT EACH ADDITIONAL Left 2018    US GUIDED BREAST BIOPSY LEFT COMPLETE Left 2018     Family History   Problem Relation Age of Onset    Breast cancer Mother 43    Lymphoma Mother     Breast cancer Maternal Aunt      Social History     Social History    Marital status:      Spouse name: N/A    Number of children: N/A    Years of education: N/A     Occupational History    Not on file       Social History Main Topics    Smoking status: Never Smoker    Smokeless tobacco: Never Used    Alcohol use No    Drug use: No    Sexual activity: Not on file     Other Topics Concern    Not on file     Social History Narrative    No narrative on file       Current Outpatient Prescriptions:     aspirin 325 mg tablet, Take 325 mg by mouth as needed for mild pain, Disp: , Rfl:     HYDROmorphone (DILAUDID) 4 mg tablet, take 1 tablet (4MG)  by oral route  every 4 - 6 hours as needed, Disp: , Rfl:     LORazepam (ATIVAN) 0 5 mg tablet, Take 0 5 mg by mouth every 8 (eight) hours as needed for anxiety, Disp: , Rfl:     morphine (MSIR) 30 MG tablet, Take 30 mg by mouth every 8 (eight) hours as needed, Disp: , Rfl:     Allergies   Allergen Reactions    Acetaminophen      Other reaction(s): temp paralysis, red flushing ski    Clindamycin GI Intolerance    Ibuprofen     Lyrica [Pregabalin]     Penicillins Other (See Comments)    Prochlorperazine Other (See Comments)    Neurontin [Gabapentin] Rash       Review of Systems:  Review of Systems   Constitutional: Positive for fatigue  HENT: Negative  Eyes: Negative  Cataracts   Respiratory: Negative  Cardiovascular: Positive for leg swelling (intermittent ankle swelling)  Gastrointestinal: Negative  Endocrine: Negative  Genitourinary: Negative  Musculoskeletal: Positive for arthralgias, back pain (scoliosis) and gait problem  Skin: Positive for wound (surgical; drain removed 10/31/18)  Allergic/Immunologic: Negative  Neurological: Positive for numbness (neuropathy of feet)  Hematological: Negative  Psychiatric/Behavioral: Positive for agitation and sleep disturbance (insomnia related to pain issues)  The patient is nervous/anxious          Vitals:    11/02/18 1340   BP: 140/84   Pulse: 73   Resp: 16   Temp: 98 7 °F (37 1 °C)   SpO2: 97%   Weight: 64 3 kg (141 lb 12 8 oz)   Height: 5' 6" (1 676 m)       Pain Score:   8 (spine & left arm, chest muscles)    Imaging:Nm Pet Ct Skull Base To Mid Thigh    Result Date: 10/24/2018  Narrative: PET/CT SCAN INDICATION:  C50 912: Malignant neoplasm of unspecified site of left female breast   newly diagnosed breast cancer, left mastectomy 10/9/2018, 2 drains in left chest, staging for treatment management MODIFIER: PI COMPARISON: CT chest 7/25/2016 CELL TYPE:  Invasive lobular carcinoma TECHNIQUE:   11 159 mCi F-18-FDG administered IV  Multiplanar attenuation corrected and non attenuation corrected PET images are available for interpretation, and contiguous, low dose, axial CT sections were obtained from the skull base through the femurs   Intravenous contrast material was not utilized  This examination, like all CT scans performed in the [facility], was performed utilizing techniques to minimize radiation dose exposure, including the use of iterative reconstruction and automated exposure control  Fasting serum glucose: 95 mg/dl FINDINGS: VISUALIZED BRAIN:   No acute abnormalities are seen  HEAD/NECK:   There is a physiologic distribution of FDG  Small subcentimeter 6 mm node posterior to the right submandibular gland with mild FDG activity, SUV 2, is likely reactive  Otherwise no pathologic FDG avid cervical adenopathy is seen  CT images: Unremarkable  CHEST:   Postsurgical changes in the anterior left chest wall and left axilla  There is associated FDG activity, SUV 2 9, likely postsurgical inflammatory changes  No additional FDG avid soft tissue lesions that are concerning for malignancy/metastases  No hypermetabolic axillary, hilar or mediastinal adenopathy  CT images: Stable small nodular scarring in the right upper and middle lung  Left lung base granuloma  ABDOMEN:   No FDG avid soft tissue lesions are seen  Bowel activity is likely physiologic  CT images: Unremarkable  PELVIS: No FDG avid soft tissue lesions are seen  CT images: Streak artifact from bilateral hip hardware  OSSEOUS STRUCTURES: No FDG avid lesions are seen  CT images: No significant findings  Spine degenerative change and dextroscoliosis  Impression: 1  Postsurgical inflammatory changes in the left anterior chest wall and left axilla  2   No additional FDG avid soft tissue lesions that are concerning for malignancy/metastases  Workstation performed: [id number]       Teaching:  Chest RT  Skin care  Fatigue    [employer] booklets

## 2018-11-14 ENCOUNTER — OFFICE VISIT (OUTPATIENT)
Dept: HEMATOLOGY ONCOLOGY | Facility: CLINIC | Age: 63
End: 2018-11-14
Payer: COMMERCIAL

## 2018-11-14 VITALS
DIASTOLIC BLOOD PRESSURE: 86 MMHG | HEART RATE: 70 BPM | TEMPERATURE: 98.7 F | RESPIRATION RATE: 18 BRPM | WEIGHT: 141 LBS | HEIGHT: 64 IN | SYSTOLIC BLOOD PRESSURE: 142 MMHG | OXYGEN SATURATION: 97 % | BODY MASS INDEX: 24.07 KG/M2

## 2018-11-14 DIAGNOSIS — Z17.0 MALIGNANT NEOPLASM OF LEFT BREAST IN FEMALE, ESTROGEN RECEPTOR POSITIVE, UNSPECIFIED SITE OF BREAST (HCC): Primary | ICD-10-CM

## 2018-11-14 DIAGNOSIS — C50.912 MALIGNANT NEOPLASM OF LEFT BREAST IN FEMALE, ESTROGEN RECEPTOR POSITIVE, UNSPECIFIED SITE OF BREAST (HCC): Primary | ICD-10-CM

## 2018-11-14 PROCEDURE — 99245 OFF/OP CONSLTJ NEW/EST HI 55: CPT | Performed by: INTERNAL MEDICINE

## 2018-11-14 NOTE — PROGRESS NOTES
Hematology/Oncology Outpatient Follow-up  Sarah Mims 61 y o  female 1955 [id number]    Date:  11/14/2018        Assessment and Plan:  1  Malignant neoplasm of left breast in female, estrogen receptor positive, unspecified site of breast ([address] )  I had an extensive discussion with the patient and her  regarding her locally advance lobular type left breast cancer which is staged as a stage IIIA, with 3 positive sentinel lymph nodes  ER/MD positive, her 2 Brennan negative  The patient was offered adjuvant chemotherapy as it is recommended recommendation of the NCCN guidelines for her current pathologic stage  However, the patient seems to be very hesitant and pursuing any type of adjuvant chemotherapy due to the feared possible side effect  I did go in great details through the chemotherapy regimens and possible side effects including premedication and Neulasta use post dose dense Adriamycin based regimen  The patient was very concerned about the possible side effects in specially the use of steroids as premedication or for post chemotherapy anti nausea treatment  She stated that she reacts poorly to any type of steroids and would not consider it at any circumstances  I literally spent more than an hour discussing the pros and cons of pursuing well-known chemotherapy regimens for her situation including AC plus T versus TC  I also went through endocrine therapy indication and possible side effect and all her questions were answered  The patient does not seem to be very enthusiastic about pursuing chemotherapy at least at this point in time  I did advise her to read the material I gave her regarding chemotherapy side effects and to consider that the adjuvant chemotherapy since her tumor seems to be locally advanced at the time of the surgery  The patient stated that she will consider our discussion today and get back to us once she makes up her mind    I spent more than 80 min discussing the patient's condition and answering her questions  - Echo complete with contrast if indicated; Future        HPI:  The patient is a 51-year-old female with history of degenerative joint disease status post bilateral hip replacement surgery, osteoarthritis, chronic pain with a recent diagnosis of stage IIIA left breast cancer status post left total mastectomy and sentinel lymph node evaluation on the 9th October 2018 as detailed below in the oncology history  Her tumor was ER/MN positive and her 2 Brennan negative  She was seen by the Radiation Oncology team for the recommendation of adjuvant radiation treatment he came to our office today to discuss adjuvant chemotherapy  The patient has positive family history for breast cancer including her mother at age 43, and a maternal aunt at a younger age is well  Her mother was negative for any of the known germ line mutation, according to the patient  Malignant neoplasm of left breast in female, estrogen receptor positive ([facility] [address] )    9/19/2018 Initial Diagnosis     Malignant neoplasm of left breast in female, estrogen receptor positive ([facility] [address] )       9/19/2018 Biopsy     Left breast biopsy x3:  Invasive lobular carcinoma, grade 1/3  ER >90%  MN 90%  HER2 negative         10/9/2018 Surgery     Left sided nipple sparing total mastectomy  Left sided sentinel node mapping and deep biopsy with completion total axillary lymph node dissection  A   Left breast, mastectomy:  - Infiltrating lobular carcinoma, classic type, grade 1/3  - Tumor size:  Estimated to be greater than 5 cm in greatest dimension  - Tumor is seen at or within 1 mm of soft tissue margins, particularly upper outer and lower outer margins   - Extensive lobular carcinoma in situ is also seen  - Lymphatic/vascular invasion is seen  - See synoptic report for further details     B  Left axillary lymph nodes:  - Nine lymph nodes identified showing no metastatic tumor      A   [place] lymph node #1:  - Metastatic carcinoma identified in 2 of 4 lymph nodes with metastatic foci greater 2 mm and without extracapsular extension     B  [place] lymph node #2:  - One lymph node identified showing metastatic carcinoma the metastasis being greater than 2 mm and without extracapsular extension    C  Retroareolar tissue:    - Benign breast tissue showing no atypia or malignancy    - Dr Rafael Nice  - Dr Mayfield John         10/9/2018 -  Cancer Staged     Stage IIIA - pT3, pN1a, cM0, G1  Interval history:    ROS: Review of Systems   Constitutional: Positive for fatigue  Negative for activity change, appetite change, chills, diaphoresis, fever and unexpected weight change  HENT: Negative for congestion, dental problem, ear discharge, ear pain, facial swelling, hearing loss, mouth sores, nosebleeds, postnasal drip, sore throat, tinnitus and trouble swallowing  Eyes: Negative for discharge, redness, itching and visual disturbance  Respiratory: Negative for cough, chest tightness, shortness of breath and wheezing  Cardiovascular: Negative for chest pain, palpitations and leg swelling  Gastrointestinal: Positive for nausea  Negative for abdominal distention, abdominal pain, anal bleeding, blood in stool, constipation, diarrhea and vomiting  Genitourinary: Negative for difficulty urinating, dysuria, flank pain, frequency, hematuria and urgency  Musculoskeletal: Positive for arthralgias, back pain, gait problem, joint swelling and myalgias  Negative for neck pain  Skin: Negative for color change, pallor, rash and wound  Neurological: Positive for numbness  Negative for dizziness, syncope, speech difficulty, weakness, light-headedness and headaches  Hematological: Negative for adenopathy  Does not bruise/bleed easily  Psychiatric/Behavioral: Positive for sleep disturbance  Negative for agitation, behavioral problems and confusion         Past Medical History:   Diagnosis Date    Breast cancer ([facility] [address] )  DJD (degenerative joint disease)     History of blood clots     Osteoarthritis     left hip    Osteoarthritis     Pain        Past Surgical History:   Procedure Laterality Date    OVARY SURGERY      TOTAL HIP ARTHROPLASTY      US GUIDANCE BREAST BIOPSY LEFT EACH ADDITIONAL Left 9/19/2018    US GUIDANCE BREAST BIOPSY LEFT EACH ADDITIONAL Left 9/19/2018    US GUIDED BREAST BIOPSY LEFT COMPLETE Left 9/19/2018       Social History     Social History    Marital status:       Spouse name: N/A    Number of children: N/A    Years of education: N/A     Social History Main Topics    Smoking status: Never Smoker    Smokeless tobacco: Never Used    Alcohol use No    Drug use: No    Sexual activity: Not Asked     Other Topics Concern    None     Social History Narrative    None       Family History   Problem Relation Age of Onset    Breast cancer Mother 43    Lymphoma Mother     Breast cancer Maternal Aunt        Allergies   Allergen Reactions    Acetaminophen      Other reaction(s): temp paralysis, red flushing ski    Clindamycin GI Intolerance    Ibuprofen     Lyrica [Pregabalin]     Penicillins Other (See Comments)    Prochlorperazine Other (See Comments)    Neurontin [Gabapentin] Rash         Current Outpatient Prescriptions:     aspirin 325 mg tablet, Take 325 mg by mouth as needed for mild pain, Disp: , Rfl:     HYDROmorphone (DILAUDID) 4 mg tablet, take 1 tablet (4MG)  by oral route  every 4 - 6 hours as needed, Disp: , Rfl:     LORazepam (ATIVAN) 0 5 mg tablet, Take 0 5 mg by mouth every 8 (eight) hours as needed for anxiety, Disp: , Rfl:     morphine (MSIR) 30 MG tablet, Take 30 mg by mouth every 8 (eight) hours as needed, Disp: , Rfl:       Physical Exam:  /86 (BP Location: Right arm, Patient Position: Sitting, Cuff Size: Adult)   Pulse 70   Temp 98 7 °F (37 1 °C) (Tympanic)   Resp 18   Ht 5' 4" (1 626 m)   Wt 64 kg (141 lb)   SpO2 97%   BMI 24 20 kg/m² Physical Exam   Constitutional: She is oriented to person, place, and time  She appears well-developed and well-nourished  No distress  HENT:   Head: Normocephalic and atraumatic  Nose: Nose normal    Mouth/Throat: Oropharynx is clear and moist    Eyes: Pupils are equal, round, and reactive to light  Conjunctivae and EOM are normal  Right eye exhibits no discharge  Left eye exhibits no discharge  No scleral icterus  Neck: Normal range of motion  Neck supple  No JVD present  No tracheal deviation present  No thyromegaly present  Cardiovascular: Normal rate, regular rhythm and normal heart sounds  Exam reveals no friction rub  No murmur heard  Pulmonary/Chest: Effort normal and breath sounds normal  No stridor  No respiratory distress  She has no wheezes  She has no rales  She exhibits no tenderness  She is status post left total mastectomy and reconstruction surgery with well-healed wounds if the chest and left axilla   Abdominal: Soft  Bowel sounds are normal  She exhibits no distension and no mass  There is no hepatosplenomegaly, splenomegaly or hepatomegaly  There is no tenderness  There is no rebound and no guarding  Musculoskeletal: Normal range of motion  She exhibits no edema, tenderness or deformity  Decreased mobility, uses the walker   Lymphadenopathy:     She has no cervical adenopathy  Neurological: She is alert and oriented to person, place, and time  She has normal reflexes  No cranial nerve deficit  Coordination normal    Skin: Skin is warm and dry  No rash noted  She is not diaphoretic  No erythema  No pallor  Psychiatric: She has a normal mood and affect   Her behavior is normal  Judgment and thought content normal          Labs:  No results found for: WBC, HGB, HCT, MCV, PLT  No results found for: NA, K, CL, CO2, ANIONGAP, BUN, CREATININE, GLUCOSE, GLUF, CALCIUM, CORRECTEDCA, AST, ALT, ALKPHOS, PROT, BILITOT, EGFR  No results found for: TSH    Patient voiced understanding and agreement in the above discussion  Aware to contact our office with questions/symptoms in the interim

## 2018-11-28 ENCOUNTER — TELEPHONE (OUTPATIENT)
Dept: HEMATOLOGY ONCOLOGY | Facility: CLINIC | Age: 63
End: 2018-11-28

## 2018-11-28 NOTE — TELEPHONE ENCOUNTER
Patient called, states she declined chemo, however she would like to start tamoxifen if that is an option  Please call patient at [phone]

## 2018-11-28 NOTE — TELEPHONE ENCOUNTER
Spoke to patient and she is states that she is definitely not interested in chemotherapy but is interested in endocrine therapy  We briefly discussed the 2 options of aromatase inhibitors vs tamoxifen and common side effects  Patient would like to follow-up in the next week or two to get started on therapy  She will also be pursuing her adjuvant XRT  Will have reception schedule a follow up appt in the next week or 2  Patient prefers Wednesdays anytime  She will likely need a minimum of a 40 min time slot

## 2018-12-05 ENCOUNTER — OFFICE VISIT (OUTPATIENT)
Dept: HEMATOLOGY ONCOLOGY | Facility: CLINIC | Age: 63
End: 2018-12-05
Payer: COMMERCIAL

## 2018-12-05 VITALS
TEMPERATURE: 98.7 F | SYSTOLIC BLOOD PRESSURE: 134 MMHG | BODY MASS INDEX: 23.63 KG/M2 | OXYGEN SATURATION: 98 % | RESPIRATION RATE: 18 BRPM | HEART RATE: 87 BPM | DIASTOLIC BLOOD PRESSURE: 76 MMHG | HEIGHT: 64 IN | WEIGHT: 138.4 LBS

## 2018-12-05 DIAGNOSIS — C50.912 MALIGNANT NEOPLASM OF LEFT BREAST IN FEMALE, ESTROGEN RECEPTOR POSITIVE, UNSPECIFIED SITE OF BREAST (HCC): Primary | ICD-10-CM

## 2018-12-05 DIAGNOSIS — Z17.0 MALIGNANT NEOPLASM OF LEFT BREAST IN FEMALE, ESTROGEN RECEPTOR POSITIVE, UNSPECIFIED SITE OF BREAST (HCC): Primary | ICD-10-CM

## 2018-12-05 PROCEDURE — 99214 OFFICE O/P EST MOD 30 MIN: CPT | Performed by: INTERNAL MEDICINE

## 2018-12-05 RX ORDER — LETROZOLE 2.5 MG/1
2.5 TABLET, FILM COATED ORAL DAILY
Qty: 30 TABLET | Refills: 3 | Status: SHIPPED | OUTPATIENT
Start: 2018-12-05 | End: 2019-01-31 | Stop reason: ALTCHOICE

## 2018-12-05 RX ORDER — DIPHENOXYLATE HYDROCHLORIDE AND ATROPINE SULFATE 2.5; .025 MG/1; MG/1
1 TABLET ORAL DAILY
COMMUNITY

## 2018-12-05 RX ORDER — LIDOCAINE AND PRILOCAINE 25; 25 MG/G; MG/G
CREAM TOPICAL AS NEEDED
COMMUNITY
End: 2019-03-06 | Stop reason: ALTCHOICE

## 2018-12-05 NOTE — PROGRESS NOTES
Hematology/Oncology Outpatient Follow-up  Biju Lynn 61 y o  female 1955 [id number]    Date:  12/5/2018        Assessment and Plan:  1  Malignant neoplasm of left breast in female, estrogen receptor positive, unspecified site of breast ([address] )  I had a very lengthy discussion with the patient and her  regarding endocrine therapy as adjuvant treatment for her locally advanced ER positive/IN positive left breast cancer  As stated below, the patient refused entirely any type of adjuvant chemotherapy due to the feared side effects  We discussed extensively the endocrine therapy options  The patient had a right lower extremity deep vein thrombosis after hip surgery which makes tamoxifen not an appropriate option  Instead, we discussed the aromatase inhibitors and potential side effects and benefits  The patient will be started on letrozole as 1st line adjuvant endocrine therapy  She was educated extensively about the potential side effects including worsening arthralgias  The patient seemed very hesitant initially without option, however, she stated that this is a less life threatening option then tamoxifen since it can cause clotting with a previous history of deep vein thrombosis  The patient was also encouraged to exercise on a regular basis and take vitamin-D and calcium supplements daily  We will obtain a bone density scan prior to her next visit  - letrozole (FEMARA) 2 5 mg tablet; Take 1 tablet (2 5 mg total) by mouth daily  Dispense: 30 tablet; Refill: 3  - CBC and differential; Future  - Comprehensive metabolic panel; Future  - Vitamin D 25 hydroxy; Future  - DXA bone density spine hip and pelvis;  Future        HPI:  The patient is a 78-year-old female with history of degenerative joint disease status post bilateral hip replacement surgery, osteoarthritis, chronic pain with a recent diagnosis of stage IIIA left breast cancer status post left total mastectomy and sentinel lymph node evaluation on the 9th October 2018 as detailed below in the oncology history  Her tumor was ER/SD positive and her 2 Brennan negative  We had a lengthy discussion with the patient at the last visit on the 14th of November regarding adjuvant chemotherapy and the potential side effects  The patient refused the option of adjuvant chemotherapy entirely  She came in today to discuss adjuvant endocrine therapy options  The patient continues to complain about her generalized achiness mainly in her hips and muscles  Malignant neoplasm of left breast in female, estrogen receptor positive ([facility] [address] )    9/19/2018 Initial Diagnosis     Malignant neoplasm of left breast in female, estrogen receptor positive ([facility] [address] )       9/19/2018 Biopsy     Left breast biopsy x3:  Invasive lobular carcinoma, grade 1/3  ER >90%  SD 90%  HER2 negative         10/9/2018 Surgery     Left sided nipple sparing total mastectomy  Left sided sentinel node mapping and deep biopsy with completion total axillary lymph node dissection  A   Left breast, mastectomy:  - Infiltrating lobular carcinoma, classic type, grade 1/3  - Tumor size:  Estimated to be greater than 5 cm in greatest dimension  - Tumor is seen at or within 1 mm of soft tissue margins, particularly upper outer and lower outer margins   - Extensive lobular carcinoma in situ is also seen  - Lymphatic/vascular invasion is seen  - See synoptic report for further details     B  Left axillary lymph nodes:  - Nine lymph nodes identified showing no metastatic tumor      A  [place] lymph node #1:  - Metastatic carcinoma identified in 2 of 4 lymph nodes with metastatic foci greater 2 mm and without extracapsular extension     B  [place] lymph node #2:  - One lymph node identified showing metastatic carcinoma the metastasis being greater than 2 mm and without extracapsular extension    C   Retroareolar tissue:    - Benign breast tissue showing no atypia or malignancy    - Dr Cornelius Garcia  -  Fuentes Grant         10/9/2018 -  Cancer Staged     Stage IIIA - pT3, pN1a, cM0, G1  Interval history:    ROS: Review of Systems   Constitutional: Negative for activity change, appetite change, chills, diaphoresis, fatigue, fever and unexpected weight change  HENT: Negative for congestion, dental problem, ear discharge, ear pain, facial swelling, hearing loss, mouth sores, nosebleeds, postnasal drip, sore throat, tinnitus and trouble swallowing  Eyes: Negative for discharge, redness, itching and visual disturbance  Respiratory: Negative for cough, chest tightness, shortness of breath and wheezing  Cardiovascular: Negative for chest pain, palpitations and leg swelling  Gastrointestinal: Negative for abdominal distention, abdominal pain, anal bleeding, blood in stool, constipation, diarrhea, nausea and vomiting  Genitourinary: Negative for difficulty urinating, dysuria, flank pain, frequency, hematuria and urgency  Musculoskeletal: Positive for arthralgias (Both hips)  Negative for back pain, gait problem, joint swelling, myalgias and neck pain  Skin: Negative for color change, pallor, rash and wound  Neurological: Negative for dizziness, syncope, speech difficulty, weakness, light-headedness, numbness and headaches  Hematological: Negative for adenopathy  Does not bruise/bleed easily  Psychiatric/Behavioral: Positive for sleep disturbance  Negative for agitation, behavioral problems and confusion          Depressed mood       Past Medical History:   Diagnosis Date    Breast cancer ([facility] [address] )     DJD (degenerative joint disease)     History of blood clots     Osteoarthritis     left hip    Osteoarthritis     Pain        Past Surgical History:   Procedure Laterality Date    OVARY SURGERY      TOTAL HIP ARTHROPLASTY      US GUIDANCE BREAST BIOPSY LEFT EACH ADDITIONAL Left 9/19/2018    US GUIDANCE BREAST BIOPSY LEFT EACH ADDITIONAL Left 9/19/2018    US GUIDED BREAST BIOPSY LEFT COMPLETE Left 9/19/2018       Social History     Social History    Marital status:       Spouse name: N/A    Number of children: N/A    Years of education: N/A     Social History Main Topics    Smoking status: Never Smoker    Smokeless tobacco: Never Used    Alcohol use No    Drug use: No    Sexual activity: Not on file     Other Topics Concern    Not on file     Social History Narrative    No narrative on file       Family History   Problem Relation Age of Onset    Breast cancer Mother 43    Lymphoma Mother     Breast cancer Maternal Aunt        Allergies   Allergen Reactions    Acetaminophen      Other reaction(s): temp paralysis, red flushing ski    Clindamycin GI Intolerance    Corticosteroids      Oral and IV    Ibuprofen     Lyrica [Pregabalin]     Penicillins Other (See Comments)    Prochlorperazine Other (See Comments)    Neurontin [Gabapentin] Rash         Current Outpatient Prescriptions:     aspirin 325 mg tablet, Take 325 mg by mouth as needed for mild pain, Disp: , Rfl:     HYDROmorphone (DILAUDID) 4 mg tablet, take 1 tablet (4MG)  by oral route  every 4 - 6 hours as needed, Disp: , Rfl:     lidocaine-prilocaine (EMLA) cream, Apply topically as needed for mild pain, Disp: , Rfl:     LORazepam (ATIVAN) 0 5 mg tablet, Take 0 5 mg by mouth every 8 (eight) hours as needed for anxiety, Disp: , Rfl:     morphine (MSIR) 30 MG tablet, Take 30 mg by mouth every 8 (eight) hours as needed, Disp: , Rfl:     multivitamin (THERAGRAN) TABS, Take 1 tablet by mouth daily, Disp: , Rfl:     letrozole (FEMARA) 2 5 mg tablet, Take 1 tablet (2 5 mg total) by mouth daily, Disp: 30 tablet, Rfl: 3      Physical Exam:  /76 (BP Location: Right arm, Patient Position: Sitting)   Pulse 87   Temp 98 7 °F (37 1 °C) (Tympanic)   Resp 18   Ht 5' 3 5" (1 613 m)   Wt 62 8 kg (138 lb 6 4 oz)   SpO2 98%   BMI 24 13 kg/m²     Physical Exam   Constitutional: She is oriented to person, place, and time  She appears well-developed and well-nourished  No distress  HENT:   Head: Normocephalic and atraumatic  Nose: Nose normal    Mouth/Throat: Oropharynx is clear and moist    Eyes: Pupils are equal, round, and reactive to light  Conjunctivae and EOM are normal  Right eye exhibits no discharge  Left eye exhibits no discharge  No scleral icterus  Neck: Normal range of motion  Neck supple  No JVD present  No tracheal deviation present  No thyromegaly present  Cardiovascular: Normal rate, regular rhythm and normal heart sounds  Exam reveals no friction rub  No murmur heard  Pulmonary/Chest: Effort normal and breath sounds normal  No stridor  No respiratory distress  She has no wheezes  She has no rales  She exhibits no tenderness  Abdominal: Soft  Bowel sounds are normal  She exhibits no distension and no mass  There is no hepatosplenomegaly, splenomegaly or hepatomegaly  There is tenderness (Mild tenderness on palpation all over her abdomen)  There is no rebound and no guarding  Musculoskeletal: Normal range of motion  She exhibits no edema, tenderness or deformity  Ambulatory dysfunction uses the walker   Lymphadenopathy:     She has no cervical adenopathy  Neurological: She is alert and oriented to person, place, and time  She has normal reflexes  No cranial nerve deficit  Coordination normal    Skin: Skin is warm and dry  No rash noted  She is not diaphoretic  No erythema  No pallor  Psychiatric: She has a normal mood and affect  Her behavior is normal  Judgment and thought content normal          Labs:  No results found for: WBC, HGB, HCT, MCV, PLT  No results found for: NA, K, CL, CO2, ANIONGAP, BUN, CREATININE, GLUCOSE, GLUF, CALCIUM, CORRECTEDCA, AST, ALT, ALKPHOS, PROT, BILITOT, EGFR  No results found for: TSH    Patient voiced understanding and agreement in the above discussion  Aware to contact our office with questions/symptoms in the interim

## 2019-01-03 ENCOUNTER — RADIATION ONCOLOGY CONSULT (OUTPATIENT)
Dept: RADIATION ONCOLOGY | Facility: CLINIC | Age: 64
End: 2019-01-03
Attending: RADIOLOGY
Payer: COMMERCIAL

## 2019-01-03 VITALS
SYSTOLIC BLOOD PRESSURE: 132 MMHG | HEIGHT: 64 IN | TEMPERATURE: 99.1 F | WEIGHT: 138.4 LBS | BODY MASS INDEX: 23.63 KG/M2 | RESPIRATION RATE: 18 BRPM | HEART RATE: 89 BPM | DIASTOLIC BLOOD PRESSURE: 80 MMHG

## 2019-01-03 DIAGNOSIS — C50.812 MALIGNANT NEOPLASM OF OVERLAPPING SITES OF LEFT BREAST IN FEMALE, ESTROGEN RECEPTOR POSITIVE (HCC): Primary | ICD-10-CM

## 2019-01-03 DIAGNOSIS — Z17.0 MALIGNANT NEOPLASM OF OVERLAPPING SITES OF LEFT BREAST IN FEMALE, ESTROGEN RECEPTOR POSITIVE (HCC): Primary | ICD-10-CM

## 2019-01-03 PROCEDURE — 99215 OFFICE O/P EST HI 40 MIN: CPT | Performed by: RADIOLOGY

## 2019-01-03 PROCEDURE — 77290 THER RAD SIMULAJ FIELD CPLX: CPT | Performed by: RADIOLOGY

## 2019-01-03 PROCEDURE — 77334 RADIATION TREATMENT AID(S): CPT | Performed by: RADIOLOGY

## 2019-01-03 NOTE — PROGRESS NOTES
Follow-up - Radiation Oncology   Asim Jacobsen 1955 61 y o  female [id number]      History of Present Illness   Cancer Staging  Malignant neoplasm of left breast in female, estrogen receptor positive ([facility] [address] )  Staging form: Breast, AJCC 8th Edition  - Clinical stage from 1/3/2019: Stage IIA (cT3, cN1, cM0, G1, ER: Positive, MI: Positive, HER2: Negative) - Signed by Nika Marie MD on 1/5/2019  - Pathologic stage from 1/3/2019: Stage IB (pT3, pN1a, cM0, G1, ER: Positive, MI: Positive, HER2: Negative) - Signed by Nika Marie MD on 1/5/2019      Asim Jacobsen is a 61y o  year old female with a history of an anatomic stage IIIA grade 1 invasive lobular carcinoma left breast status post nipple sparing mastectomy and sentinel lymph node biopsy with axillary lymph node dissection   There was a close margin in the upper outer and lower outer soft tissue      She presented with a history feeling a mass in her left breast  Bilateral diagnostic mammogram did not show an underlying mass and the ultrasound revealed small hypoechoic area suspicious enough to warrant ultrasound-guided biopsy  Ultrasound-guided biopsy was performed September 19 from 4, 5 and 6 o'clock all revealing of invasive lobular carcinoma  On October 9 she underwent left-sided nipple sparing mastectomy, sentinel lymph node biopsy and axillary lymph node dissection  Final pathologic diagnosis was a grade 1 invasive lobular carcinoma, classic type, tumor size greater than 5 cm and was at or within 1 mm of the soft tissue margins in the upper outer and lower outer margins   There was extensive LCIS  Lymphovascular invasion was seen  3/13 lymph nodes positive for metastatic carcinoma  These were macrometastases more than 2 mm  ER more than 90% positive, MI 90% positive and HER2 Brennan negative  She had a chest wall expander for reconstruction later      She was seen by Dr Shannan Simons on 11/2/18 for initial radiation oncology consult   Adjuvant radiation therapy was discussed with the possibility of needing adjuvant chemotherapy first      Interval History: She returns today for follow up and to initiate radiation planning      11/14/18 Dr Rama Reddy consult  - Adjuvant chemotherapy was recommended, patient hesitant to pursue due to possible side effects  - patient requested time to consider systemic treatment options     12/5/18 Dr Rama Reddy f/u  - patient refusing chemotherapy  - adjuvant endocrine therapy discussed, plan for letrozole 2 5 mg daily     She started Letrozole last month, states she was having a hard time tolerating medication and currently is only taking half a tablet daily  She reports not feeling well since being on hormonal therapy, reports having increased musculoskeletal pain, occasional headaches, sore throat and diarrhea  She states her tongue swells after taking letrozole, so she has been taking benadryl with the letrozole, "to help balance" the tongue swelling  All of the above symptoms are worse when she takes a whole letrozole tablet, therefore she has only been using half a tablet  She reports that she has not called Dr Jackie Hua office to report this  She has history degenerative bone disease and scoliosis and follows with Dr Abi Tipton with [facility]  for pain management  She sees him every 3 months  She states her last expanders fill was around 12/12/18 and no more fills planned  She also reports a "burning and throbbing" discomfort in the left upper/axilla and left lateral chest that is stable  Patient states her mother is currently dying from stage IV non-Hodgkin's lymphoma  She lives in [place]  Her mother has a history of breast cancer treated in the 1970s with a radical mastectomy followed by postoperative radiation therapy  Her mother had genetic testing performed that was negative    She has 1 sister who is healthy and has no history of any breast cancer      Future Appointments:  1/21/19 Dexa scan  3/6/19  [place]  March 2019  - Dr Annel Adler      Malignant neoplasm of left breast in female, estrogen receptor positive ([facility] [address] )    9/19/2018 Initial Diagnosis     Malignant neoplasm of left breast in female, estrogen receptor positive ([facility] [address] )       9/19/2018 Biopsy     Left breast biopsy x3:  Invasive lobular carcinoma, grade 1/3  ER >90%  TN 90%  HER2 negative         10/9/2018 Surgery     Left sided nipple sparing total mastectomy  Left sided sentinel node mapping and deep biopsy with completion total axillary lymph node dissection  A   Left breast, mastectomy:  - Infiltrating lobular carcinoma, classic type, grade 1/3  - Tumor size:  Estimated to be greater than 5 cm in greatest dimension  - Tumor is seen at or within 1 mm of soft tissue margins, particularly upper outer and lower outer margins   - Extensive lobular carcinoma in situ is also seen  - Lymphatic/vascular invasion is seen  - See synoptic report for further details     B  Left axillary lymph nodes:  - Nine lymph nodes identified showing no metastatic tumor      A  [place] lymph node #1:  - Metastatic carcinoma identified in 2 of 4 lymph nodes with metastatic foci greater 2 mm and without extracapsular extension     B  [place] lymph node #2:  - One lymph node identified showing metastatic carcinoma the metastasis being greater than 2 mm and without extracapsular extension    C   Retroareolar tissue:    - Benign breast tissue showing no atypia or malignancy    - Dr Ifeoma Costa  - Dr Bigg Banks         10/9/2018 -  Cancer Staged     Stage IIIA - pT3, pN1a, cM0, G1            12/2018 -  Hormone Therapy     Letrozole 2 5 mg daily     - Dr Maria L Agrawal          Clinical Trial: no    Screening  Tobacco  Current tobacco user: no  If yes, brief counseling provided: NA     Hypertension  Hypertension screening performed: yes  Normotensive:  no  If no, referred to PCP: no     Depression Screening  Screened for depression using PHQ-2: yes     Screened for depression using PHQ-9:  no  Screening positive or negative:  negative  If score >4, was any of the following actions taken?    Additional evaluation for depression, suicide risk assesment, referral to PCP or psychiatry, medication started:  n/a     Advanced Care Planning for Patients >65 years  Advanced Care Planning Discussed:  n/a  Patient named surrogate decision maker or care plan in chart: n/a    Past Medical History:   Diagnosis Date    Breast cancer ([address] )     DJD (degenerative joint disease)     History of blood clots     Osteoarthritis     left hip    Osteoarthritis     Pain      Past Surgical History:   Procedure Laterality Date    OVARY SURGERY      TOTAL HIP ARTHROPLASTY      US GUIDANCE BREAST BIOPSY LEFT EACH ADDITIONAL Left 9/19/2018    US GUIDANCE BREAST BIOPSY LEFT EACH ADDITIONAL Left 9/19/2018    US GUIDED BREAST BIOPSY LEFT COMPLETE Left 9/19/2018       Social History   History   Alcohol Use No     History   Drug Use No     History   Smoking Status    Never Smoker   Smokeless Tobacco    Never Used     Meds/Allergies     Current Outpatient Prescriptions:     aspirin 325 mg tablet, Take 325 mg by mouth as needed for mild pain, Disp: , Rfl:     HYDROmorphone (DILAUDID) 4 mg tablet, take 1 tablet (4MG)  by oral route  every 4 - 6 hours as needed, Disp: , Rfl:     letrozole (FEMARA) 2 5 mg tablet, Take 1 tablet (2 5 mg total) by mouth daily, Disp: 30 tablet, Rfl: 3    lidocaine-prilocaine (EMLA) cream, Apply topically as needed for mild pain, Disp: , Rfl:     LORazepam (ATIVAN) 0 5 mg tablet, Take 0 5 mg by mouth every 8 (eight) hours as needed for anxiety, Disp: , Rfl:     morphine (MSIR) 30 MG tablet, Take 30 mg by mouth every 8 (eight) hours as needed, Disp: , Rfl:     multivitamin (THERAGRAN) TABS, Take 1 tablet by mouth daily, Disp: , Rfl:   Allergies   Allergen Reactions    Acetaminophen      Other reaction(s): temp paralysis, red flushing ski    Clindamycin GI Intolerance    Corticosteroids      Oral and IV    Ibuprofen     Lyrica [Pregabalin]     Penicillins Other (See Comments)    Prochlorperazine Other (See Comments)    Neurontin [Gabapentin] Rash     Review of Systems   Constitutional: Positive for fatigue  HENT: Negative  Eyes: Negative  Respiratory: Negative  Cardiovascular: Negative  Gastrointestinal: Positive for diarrhea (occasional)  Endocrine: Negative  Genitourinary: Negative  Musculoskeletal: Positive for back pain (ambulates with roller walker with seat)  Allergic/Immunologic: Negative  Neurological: Positive for headaches (occasional )  Hematological: Negative  Psychiatric/Behavioral: Positive for sleep disturbance (difficulty sleeping due to pain)  OBJECTIVE:   /80   Pulse 89   Temp 99 1 °F (37 3 °C) (Temporal)   Resp 18   Ht 5' 3 5" (1 613 m)   Wt 62 8 kg (138 lb 6 4 oz)   BMI 24 13 kg/m²   Pain Assessment:  2  ECOG/Zubrod/WHO: 1 - Symptomatic but completely ambulatory    Physical Exam   Constitutional: She is oriented to person, place, and time  She appears well-developed and well-nourished  No distress  HENT:   Head: Normocephalic and atraumatic  Mouth/Throat: Oropharynx is clear and moist  No oropharyngeal exudate  Eyes: Pupils are equal, round, and reactive to light  Conjunctivae and EOM are normal  No scleral icterus  Neck: Normal range of motion  Neck supple  No tracheal deviation present  No thyromegaly present  Cardiovascular: Normal rate, regular rhythm and normal heart sounds  Pulmonary/Chest: Effort normal and breath sounds normal  No respiratory distress  She has no wheezes  She has no rales  She exhibits no tenderness  Right breast exhibits no inverted nipple, no mass, no nipple discharge, no skin change and no tenderness   Left breast exhibits no inverted nipple, no mass, no nipple discharge, no skin change ( There is a well-healed mastectomy incision with underlying tissue expander intact and no evidence of any nodules or masses ) and no tenderness  Abdominal: Soft  Bowel sounds are normal  She exhibits no distension and no mass  There is no tenderness  There is no rebound and no guarding  Musculoskeletal: Normal range of motion  She exhibits no edema or tenderness  Lymphadenopathy:     She has no cervical adenopathy  She has no axillary adenopathy  Right: No supraclavicular adenopathy present  Left: No supraclavicular adenopathy present  Neurological: She is alert and oriented to person, place, and time  No cranial nerve deficit  Coordination normal    Skin: Skin is warm and dry  No rash noted  She is not diaphoretic  No erythema  No pallor  Psychiatric: She has a normal mood and affect  Her behavior is normal  Judgment and thought content normal    Nursing note and vitals reviewed  RESULTS    Lab Results: No results found for this or any previous visit (from the past 672 hour(s))  Imaging Studies:No results found  Assessment/Plan:  Orders Placed This Encounter   Procedures   [address] [facility]        Asim Jacobsen is a 61y o  year old female with a history of an anatomic stage IIIA (pathologic prognostic stage group; IB) grade 1 invasive lobular carcinoma left breast status post nipple sparing mastectomy and sentinel lymph node biopsy with axillary lymph node dissection   There was a close margin in the upper outer and lower outer soft tissue  Due to initial size of her tumor being estimated at greater than 5 cm along with tumor being seen within 1 mm of the soft tissue margins as well as 3 lymph nodes out of 14 lymph nodes being positive for metastatic disease with lymphatic and vascular invasion, she is at increased risk for locally recurrent disease  She requires postmastectomy radiation therapy to the reconstructed left chest wall, axillary, and supraclavicular regions    She was seen by Dr Anju Huizar and declined postoperative adjuvant chemotherapy due to the potential side effects  She did consent to adjuvant hormonal therapy and was given prescription for letrozole 2 5 mg daily  She is not able to tolerate a whole tablet secondary to the side effects and has been taking half a tablet per day along with Benadryl  We advised her to notify Dr Shorty Kirby about this and to discuss this with him  We discussed that there is no data on the effectiveness of only taking half letrozole daily as opposed taking the whole tablet  The the we discussed the rationale for postmastectomy radiation therapy including the acute side effects and the potential chronic complications of treatment  Informed consent was obtained today for radiation therapy  She has completed her tissue expansion  She had simulation performed today for radiation therapy to the left reconstructed chest wall, supraclavicular, and axillary regions  We discussed the use of breath hold techniques in order to reduce dose to her heart and lungs  Vinicio Arguelles MD  1/3/2019,1:48 PM    Portions of the record may have been created with voice recognition software   Occasional wrong word or "sound a like" substitutions may have occurred due to the inherent limitations of voice recognition software   Read the chart carefully and recognize, using context, where substitutions have occurred

## 2019-01-03 NOTE — PROGRESS NOTES
Sarah Prasanna  1955   Ms Cristina Shepherd is a 61 y o  female       Chief Complaint   Patient presents with    Follow-up     radiation oncology       Cancer Staging  No matching staging information was found for the patient  62 y/o female with stage IIIA grade 1 invasive lobular carcinoma left breast status post nipple sparing mastectomy and sentinel lymph node biopsy with axillary lymph node dissection  There is a close margin in the upper outer and lower outer soft tissue  She presented with a history feeling a mass in her left breast  Bilateral diagnostic mammogram did not show an underlying mass and the ultrasound revealed small hypoechoic area suspicious enough to warrant ultrasound-guided biopsy  Ultrasound-guided biopsy was performed September 19 from 4, 5 and 6 o'clock all revealing of invasive lobular carcinoma  On October 9 she underwent left-sided nipple sparing mastectomy, sentinel lymph node biopsy and axillary lymph node dissection  Final pathologic diagnosis was a grade 1 invasive lobular carcinoma, classic type, tumor size greater than 5 cm and was at or within 1 mm of the soft tissue margins in the upper outer and lower outer margins  There was extensive LCIS  Lymphovascular invasion was seen  3/13 lymph nodes positive for metastatic carcinoma  These were macrometastases more than 2 mm  ER more than 90% positive, NV 90% positive and HER2 Brennan negative  She had a chest wall expander for reconstruction later  She was seen by Dr Richar Cerna on 11/2/18 for initial radiation oncology consult   Adjuvant radiation therapy was discussed with the possibility of needing adjuvant chemotherapy first          Malignant neoplasm of left breast in female, estrogen receptor positive ([facility] [address] )    9/19/2018 Initial Diagnosis     Malignant neoplasm of left breast in female, estrogen receptor positive ([facility] [address] )       9/19/2018 Biopsy     Left breast biopsy x3:  Invasive lobular carcinoma, grade 1/3  ER >90%  NV 90%  HER2 negative         10/9/2018 Surgery     Left sided nipple sparing total mastectomy  Left sided sentinel node mapping and deep biopsy with completion total axillary lymph node dissection  A   Left breast, mastectomy:  - Infiltrating lobular carcinoma, classic type, grade 1/3  - Tumor size:  Estimated to be greater than 5 cm in greatest dimension  - Tumor is seen at or within 1 mm of soft tissue margins, particularly upper outer and lower outer margins   - Extensive lobular carcinoma in situ is also seen  - Lymphatic/vascular invasion is seen  - See synoptic report for further details     B  Left axillary lymph nodes:  - Nine lymph nodes identified showing no metastatic tumor      A  [place] lymph node #1:  - Metastatic carcinoma identified in 2 of 4 lymph nodes with metastatic foci greater 2 mm and without extracapsular extension     B  [place] lymph node #2:  - One lymph node identified showing metastatic carcinoma the metastasis being greater than 2 mm and without extracapsular extension    C  Retroareolar tissue:    - Benign breast tissue showing no atypia or malignancy    - Dr Dalila Caceres  - Dr Fannie Candelaria         10/9/2018 -  Cancer Staged     Stage IIIA - pT3, pN1a, cM0, G1            12/2018 -  Hormone Therapy     Letrozole 2 5 mg daily     - Dr Mitra Quan            Clinical Trial: no    Interval History: She returns today for follow up and to initiate radiation planning  11/14/18 Dr Mitra Quan consult  - Adjuvant chemotherapy was recommended, patient hesitant to pursue due to possible side effects  - patient requested time to consider systemic treatment options    12/5/18 Dr Mitra Quan f/u  - patient refusing chemotherapy  - adjuvant endocrine therapy discussed, plan for letrozole 2 5 mg daily      She started Letrozole last month, states she was having a hard time tolerating medication and currently is only taking half a tablet daily   She reports not feeling well since being on hormonal therapy, reports having increased musculoskeletal pain, occasional headaches, sore throat and diarrhea  She states her tongue swells after taking letrozole, so she has been taking benadryl with the letrozole, "to help balance" the tongue swelling  She reports that she has not called Dr Morenita Rodriguez office to report this  She has history degenerative bone disease and scoliosis and follows with Dr Jeffy Ramey with [address]  for pain management  She sees him every 3 months  She states her last expanders fill was around 12/12/18  She also reports a "burning and throbbing" discomfort  Left upper/axilla and left lateral chest    Patient states her mother is currently dying from stage IV non-Hodgkin's lymphoma  She lives in [place]  Future Appointments:  1/21/19 Dexa scan  3/6/19 Dr Silvia Monson  March 2019  - Dr Love Xavier          Screening  Tobacco  Current tobacco user: no  If yes, brief counseling provided: NA    Hypertension  Hypertension screening performed: yes  Normotensive:  no  If no, referred to PCP: no    Depression Screening  Screened for depression using PHQ-2: yes    Screened for depression using PHQ-9:  no  Screening positive or negative:  negative  If score >4, was any of the following actions taken?    Additional evaluation for depression, suicide risk assesment, referral to PCP or psychiatry, medication started:  [id number] [facility] for Patients >65 years  Advanced Care Planning Discussed:  n/a  Patient named surrogate decision maker or care plan in chart: n/a    [unfilled]  Health Maintenance   Topic Date Due    Hepatitis C Screening  1955    CRC Screening: Colonoscopy  1955    Pneumococcal PPSV23 Highest Risk Adult (2 of 3 - PCV13) 10/03/2017    INFLUENZA VACCINE  07/01/2018    Depression Screening PHQ  11/02/2019    DTaP,Tdap,and Td Vaccines (2 - Td) 10/28/2023       Patient Active Problem List   Diagnosis    History of total hip arthroplasty    Chronic deep vein thrombosis (DVT) of distal vein of right lower extremity (HCC)    Malignant neoplasm of left breast in female, estrogen receptor positive ([facility] [address] )     Past Medical History:   Diagnosis Date    Breast cancer ([facility] [address] )     DJD (degenerative joint disease)     History of blood clots     Osteoarthritis     left hip    Osteoarthritis     Pain      Past Surgical History:   Procedure Laterality Date    OVARY SURGERY      TOTAL HIP ARTHROPLASTY      US GUIDANCE BREAST BIOPSY LEFT EACH ADDITIONAL Left 9/19/2018    US GUIDANCE BREAST BIOPSY LEFT EACH ADDITIONAL Left 9/19/2018    US GUIDED BREAST BIOPSY LEFT COMPLETE Left 9/19/2018     Family History   Problem Relation Age of Onset    Breast cancer Mother 43    Lymphoma Mother     Breast cancer Maternal Aunt      Social History     Social History    Marital status:      Spouse name: N/A    Number of children: N/A    Years of education: N/A     Occupational History    Not on file       Social History Main Topics    Smoking status: Never Smoker    Smokeless tobacco: Never Used    Alcohol use No    Drug use: No    Sexual activity: Not on file     Other Topics Concern    Not on file     Social History Narrative    No narrative on file       Current Outpatient Prescriptions:     aspirin 325 mg tablet, Take 325 mg by mouth as needed for mild pain, Disp: , Rfl:     HYDROmorphone (DILAUDID) 4 mg tablet, take 1 tablet (4MG)  by oral route  every 4 - 6 hours as needed, Disp: , Rfl:     letrozole (FEMARA) 2 5 mg tablet, Take 1 tablet (2 5 mg total) by mouth daily, Disp: 30 tablet, Rfl: 3    lidocaine-prilocaine (EMLA) cream, Apply topically as needed for mild pain, Disp: , Rfl:     LORazepam (ATIVAN) 0 5 mg tablet, Take 0 5 mg by mouth every 8 (eight) hours as needed for anxiety, Disp: , Rfl:     morphine (MSIR) 30 MG tablet, Take 30 mg by mouth every 8 (eight) hours as needed, Disp: , Rfl:     multivitamin (THERAGRAN) TABS, Take 1 tablet by mouth daily, Disp: , Rfl:   Allergies   Allergen Reactions    Acetaminophen      Other reaction(s): temp paralysis, red flushing ski    Clindamycin GI Intolerance    Corticosteroids      Oral and IV    Ibuprofen     Lyrica [Pregabalin]     Penicillins Other (See Comments)    Prochlorperazine Other (See Comments)    Neurontin [Gabapentin] Rash       Review of Systems:  Review of Systems   Constitutional: Positive for fatigue  HENT: Negative  Eyes: Negative  Respiratory: Negative  Cardiovascular: Negative  Gastrointestinal: Positive for diarrhea (occasional)  Endocrine: Negative  Genitourinary: Negative  Musculoskeletal: Positive for back pain (ambulates with roller walker with seat)  Allergic/Immunologic: Negative  Neurological: Positive for headaches (occasional )  Hematological: Negative  Psychiatric/Behavioral: Positive for sleep disturbance (difficulty sleeping due to pain)  Vitals:    01/03/19 1251   BP: 132/80   Pulse: 89   Resp: 18   Temp: 99 1 °F (37 3 °C)   TempSrc: Temporal   Weight: 62 8 kg (138 lb 6 4 oz)   Height: 5' 3 5" (1 613 m)            Imaging:No results found      Teaching: reviewed RT to breast

## 2019-01-07 ENCOUNTER — TELEPHONE (OUTPATIENT)
Dept: FAMILY MEDICINE CLINIC | Facility: CLINIC | Age: 64
End: 2019-01-07

## 2019-01-07 NOTE — TELEPHONE ENCOUNTER
Provider # [id number]  EYB#[phone]  DX: [id number]  [employer]  Dr Carolina botello [place], [place]  Office visit     Betty Matos Ask The ref they have  and they are requesting a new one

## 2019-01-18 PROCEDURE — 77295 3-D RADIOTHERAPY PLAN: CPT | Performed by: RADIOLOGY

## 2019-01-18 PROCEDURE — 77334 RADIATION TREATMENT AID(S): CPT | Performed by: RADIOLOGY

## 2019-01-18 PROCEDURE — 77300 RADIATION THERAPY DOSE PLAN: CPT | Performed by: RADIOLOGY

## 2019-01-22 ENCOUNTER — HOSPITAL ENCOUNTER (OUTPATIENT)
Dept: BONE DENSITY | Facility: CLINIC | Age: 64
Discharge: HOME | End: 2019-01-22
Payer: COMMERCIAL

## 2019-01-22 DIAGNOSIS — C50.912 MALIGNANT NEOPLASM OF LEFT BREAST IN FEMALE, ESTROGEN RECEPTOR POSITIVE, UNSPECIFIED SITE OF BREAST (HCC): ICD-10-CM

## 2019-01-22 DIAGNOSIS — Z17.0 MALIGNANT NEOPLASM OF LEFT BREAST IN FEMALE, ESTROGEN RECEPTOR POSITIVE, UNSPECIFIED SITE OF BREAST (HCC): ICD-10-CM

## 2019-01-22 PROCEDURE — 77080 DXA BONE DENSITY AXIAL: CPT

## 2019-01-23 ENCOUNTER — TELEPHONE (OUTPATIENT)
Dept: HEMATOLOGY ONCOLOGY | Facility: CLINIC | Age: 64
End: 2019-01-23

## 2019-01-23 NOTE — TELEPHONE ENCOUNTER
Patient called and stated she wants her results of her  Dexa scan  She said she is starting radiation tomorrow   And she is worried it will effect her  Also she said she is  Having  Trouble with Letrozole and she is only taking half   A pill now  She would like a return phone call

## 2019-01-23 NOTE — TELEPHONE ENCOUNTER
Returned phone call to patient regarding her bone density and gave her results, instructed her to take Calcium 600 mg with Vit D 800-1,000 IUs BID  Patient also reported that she has been having a terrible time with her letrozole since she has started taking the drug approximately one moth ago,she report tongue swelling, throat pain upset stomach, and muscle and joint pain  Patient started to take benadryl before taking Letrozole but cut her Letrozole in half  Instructed patient to stop taking Letrozole for one week and call us back with report on how she was doing

## 2019-01-24 PROCEDURE — 77280 THER RAD SIMULAJ FIELD SMPL: CPT | Performed by: RADIOLOGY

## 2019-01-24 PROCEDURE — 77387 GUIDANCE FOR RADJ TX DLVR: CPT | Performed by: RADIOLOGY

## 2019-01-25 DIAGNOSIS — C50.812 MALIGNANT NEOPLASM OF OVERLAPPING SITES OF LEFT BREAST IN FEMALE, ESTROGEN RECEPTOR POSITIVE (HCC): Primary | ICD-10-CM

## 2019-01-25 DIAGNOSIS — Z17.0 MALIGNANT NEOPLASM OF OVERLAPPING SITES OF LEFT BREAST IN FEMALE, ESTROGEN RECEPTOR POSITIVE (HCC): Primary | ICD-10-CM

## 2019-01-28 ENCOUNTER — APPOINTMENT (OUTPATIENT)
Dept: RADIATION ONCOLOGY | Facility: CLINIC | Age: 64
End: 2019-01-28
Attending: RADIOLOGY
Payer: COMMERCIAL

## 2019-01-28 DIAGNOSIS — C50.912 MALIGNANT NEOPLASM OF LEFT FEMALE BREAST, UNSPECIFIED ESTROGEN RECEPTOR STATUS, UNSPECIFIED SITE OF BREAST (HCC): Primary | ICD-10-CM

## 2019-01-28 PROCEDURE — 77412 RADIATION TX DELIVERY LVL 3: CPT | Performed by: RADIOLOGY

## 2019-01-28 PROCEDURE — 77331 SPECIAL RADIATION DOSIMETRY: CPT | Performed by: RADIOLOGY

## 2019-01-28 PROCEDURE — 77387 GUIDANCE FOR RADJ TX DLVR: CPT | Performed by: RADIOLOGY

## 2019-01-28 RX ORDER — LORAZEPAM 1 MG/1
1 TABLET ORAL EVERY 8 HOURS PRN
Qty: 60 TABLET | Refills: 0 | Status: SHIPPED | OUTPATIENT
Start: 2019-01-28 | End: 2019-02-22 | Stop reason: SDUPTHER

## 2019-01-29 ENCOUNTER — APPOINTMENT (OUTPATIENT)
Dept: RADIATION ONCOLOGY | Facility: CLINIC | Age: 64
End: 2019-01-29
Attending: RADIOLOGY
Payer: COMMERCIAL

## 2019-01-29 PROCEDURE — 77331 SPECIAL RADIATION DOSIMETRY: CPT | Performed by: RADIOLOGY

## 2019-01-29 PROCEDURE — 77412 RADIATION TX DELIVERY LVL 3: CPT | Performed by: RADIOLOGY

## 2019-01-29 PROCEDURE — 77387 GUIDANCE FOR RADJ TX DLVR: CPT | Performed by: RADIOLOGY

## 2019-01-30 ENCOUNTER — APPOINTMENT (OUTPATIENT)
Dept: RADIATION ONCOLOGY | Facility: CLINIC | Age: 64
End: 2019-01-30
Attending: RADIOLOGY
Payer: COMMERCIAL

## 2019-01-30 PROCEDURE — 77412 RADIATION TX DELIVERY LVL 3: CPT | Performed by: RADIOLOGY

## 2019-01-30 PROCEDURE — 77387 GUIDANCE FOR RADJ TX DLVR: CPT | Performed by: RADIOLOGY

## 2019-01-31 ENCOUNTER — APPOINTMENT (OUTPATIENT)
Dept: RADIATION ONCOLOGY | Facility: CLINIC | Age: 64
End: 2019-01-31
Attending: RADIOLOGY
Payer: COMMERCIAL

## 2019-01-31 PROCEDURE — 77387 GUIDANCE FOR RADJ TX DLVR: CPT | Performed by: RADIOLOGY

## 2019-01-31 PROCEDURE — 77412 RADIATION TX DELIVERY LVL 3: CPT | Performed by: RADIOLOGY

## 2019-01-31 RX ORDER — MAG HYDROX/ALUMINUM HYD/SIMETH 400-400-40
1 SUSPENSION, ORAL (FINAL DOSE FORM) ORAL AS NEEDED
COMMUNITY

## 2019-02-01 ENCOUNTER — RADIATION THERAPY TREATMENT (OUTPATIENT)
Dept: RADIATION ONCOLOGY | Facility: CLINIC | Age: 64
End: 2019-02-01
Attending: RADIOLOGY
Payer: COMMERCIAL

## 2019-02-01 PROCEDURE — 77387 GUIDANCE FOR RADJ TX DLVR: CPT | Performed by: RADIOLOGY

## 2019-02-01 PROCEDURE — 77417 THER RADIOLOGY PORT IMAGE(S): CPT | Performed by: RADIOLOGY

## 2019-02-01 PROCEDURE — 77336 RADIATION PHYSICS CONSULT: CPT | Performed by: RADIOLOGY

## 2019-02-01 PROCEDURE — 77412 RADIATION TX DELIVERY LVL 3: CPT | Performed by: RADIOLOGY

## 2019-02-04 ENCOUNTER — APPOINTMENT (OUTPATIENT)
Dept: RADIATION ONCOLOGY | Facility: CLINIC | Age: 64
End: 2019-02-04
Attending: RADIOLOGY
Payer: COMMERCIAL

## 2019-02-04 PROCEDURE — 77387 GUIDANCE FOR RADJ TX DLVR: CPT | Performed by: RADIOLOGY

## 2019-02-04 PROCEDURE — 77412 RADIATION TX DELIVERY LVL 3: CPT | Performed by: RADIOLOGY

## 2019-02-05 ENCOUNTER — APPOINTMENT (OUTPATIENT)
Dept: RADIATION ONCOLOGY | Facility: CLINIC | Age: 64
End: 2019-02-05
Attending: RADIOLOGY
Payer: COMMERCIAL

## 2019-02-05 PROCEDURE — 77412 RADIATION TX DELIVERY LVL 3: CPT | Performed by: RADIOLOGY

## 2019-02-05 PROCEDURE — 77387 GUIDANCE FOR RADJ TX DLVR: CPT | Performed by: RADIOLOGY

## 2019-02-06 ENCOUNTER — APPOINTMENT (OUTPATIENT)
Dept: RADIATION ONCOLOGY | Facility: CLINIC | Age: 64
End: 2019-02-06
Attending: RADIOLOGY
Payer: COMMERCIAL

## 2019-02-06 PROCEDURE — 77412 RADIATION TX DELIVERY LVL 3: CPT | Performed by: RADIOLOGY

## 2019-02-06 PROCEDURE — 77387 GUIDANCE FOR RADJ TX DLVR: CPT | Performed by: RADIOLOGY

## 2019-02-07 ENCOUNTER — APPOINTMENT (OUTPATIENT)
Dept: RADIATION ONCOLOGY | Facility: CLINIC | Age: 64
End: 2019-02-07
Attending: RADIOLOGY
Payer: COMMERCIAL

## 2019-02-07 PROCEDURE — 77387 GUIDANCE FOR RADJ TX DLVR: CPT | Performed by: RADIOLOGY

## 2019-02-07 PROCEDURE — 77412 RADIATION TX DELIVERY LVL 3: CPT | Performed by: RADIOLOGY

## 2019-02-08 ENCOUNTER — APPOINTMENT (OUTPATIENT)
Dept: RADIATION ONCOLOGY | Facility: CLINIC | Age: 64
End: 2019-02-08
Attending: RADIOLOGY
Payer: COMMERCIAL

## 2019-02-08 ENCOUNTER — APPOINTMENT (OUTPATIENT)
Dept: LAB | Facility: CLINIC | Age: 64
End: 2019-02-08
Attending: RADIOLOGY
Payer: COMMERCIAL

## 2019-02-08 PROCEDURE — 77387 GUIDANCE FOR RADJ TX DLVR: CPT | Performed by: RADIOLOGY

## 2019-02-08 PROCEDURE — 77336 RADIATION PHYSICS CONSULT: CPT | Performed by: RADIOLOGY

## 2019-02-08 PROCEDURE — 77412 RADIATION TX DELIVERY LVL 3: CPT | Performed by: RADIOLOGY

## 2019-02-11 ENCOUNTER — APPOINTMENT (OUTPATIENT)
Dept: RADIATION ONCOLOGY | Facility: CLINIC | Age: 64
End: 2019-02-11
Attending: RADIOLOGY
Payer: COMMERCIAL

## 2019-02-11 PROCEDURE — 77387 GUIDANCE FOR RADJ TX DLVR: CPT | Performed by: RADIOLOGY

## 2019-02-11 PROCEDURE — 77417 THER RADIOLOGY PORT IMAGE(S): CPT | Performed by: RADIOLOGY

## 2019-02-11 PROCEDURE — 77412 RADIATION TX DELIVERY LVL 3: CPT | Performed by: RADIOLOGY

## 2019-02-12 ENCOUNTER — APPOINTMENT (OUTPATIENT)
Dept: RADIATION ONCOLOGY | Facility: CLINIC | Age: 64
End: 2019-02-12
Attending: RADIOLOGY
Payer: COMMERCIAL

## 2019-02-12 PROCEDURE — 77412 RADIATION TX DELIVERY LVL 3: CPT | Performed by: RADIOLOGY

## 2019-02-12 PROCEDURE — 77387 GUIDANCE FOR RADJ TX DLVR: CPT | Performed by: RADIOLOGY

## 2019-02-13 ENCOUNTER — APPOINTMENT (OUTPATIENT)
Dept: RADIATION ONCOLOGY | Facility: CLINIC | Age: 64
End: 2019-02-13
Attending: RADIOLOGY
Payer: COMMERCIAL

## 2019-02-13 PROCEDURE — 77412 RADIATION TX DELIVERY LVL 3: CPT | Performed by: RADIOLOGY

## 2019-02-13 PROCEDURE — 77387 GUIDANCE FOR RADJ TX DLVR: CPT | Performed by: RADIOLOGY

## 2019-02-14 ENCOUNTER — APPOINTMENT (OUTPATIENT)
Dept: RADIATION ONCOLOGY | Facility: CLINIC | Age: 64
End: 2019-02-14
Attending: RADIOLOGY
Payer: COMMERCIAL

## 2019-02-14 PROCEDURE — 77387 GUIDANCE FOR RADJ TX DLVR: CPT | Performed by: RADIOLOGY

## 2019-02-14 PROCEDURE — 77412 RADIATION TX DELIVERY LVL 3: CPT | Performed by: RADIOLOGY

## 2019-02-15 ENCOUNTER — APPOINTMENT (OUTPATIENT)
Dept: RADIATION ONCOLOGY | Facility: CLINIC | Age: 64
End: 2019-02-15
Attending: RADIOLOGY
Payer: COMMERCIAL

## 2019-02-15 PROCEDURE — 77412 RADIATION TX DELIVERY LVL 3: CPT | Performed by: RADIOLOGY

## 2019-02-15 PROCEDURE — 77336 RADIATION PHYSICS CONSULT: CPT | Performed by: RADIOLOGY

## 2019-02-15 PROCEDURE — 77387 GUIDANCE FOR RADJ TX DLVR: CPT | Performed by: RADIOLOGY

## 2019-02-18 ENCOUNTER — APPOINTMENT (OUTPATIENT)
Dept: RADIATION ONCOLOGY | Facility: CLINIC | Age: 64
End: 2019-02-18
Attending: RADIOLOGY
Payer: COMMERCIAL

## 2019-02-18 PROCEDURE — 77387 GUIDANCE FOR RADJ TX DLVR: CPT | Performed by: RADIOLOGY

## 2019-02-18 PROCEDURE — 77412 RADIATION TX DELIVERY LVL 3: CPT | Performed by: RADIOLOGY

## 2019-02-19 ENCOUNTER — APPOINTMENT (OUTPATIENT)
Dept: RADIATION ONCOLOGY | Facility: CLINIC | Age: 64
End: 2019-02-19
Attending: RADIOLOGY
Payer: COMMERCIAL

## 2019-02-19 PROCEDURE — 77387 GUIDANCE FOR RADJ TX DLVR: CPT | Performed by: RADIOLOGY

## 2019-02-19 PROCEDURE — 77417 THER RADIOLOGY PORT IMAGE(S): CPT | Performed by: RADIOLOGY

## 2019-02-19 PROCEDURE — 77412 RADIATION TX DELIVERY LVL 3: CPT | Performed by: RADIOLOGY

## 2019-02-20 ENCOUNTER — APPOINTMENT (OUTPATIENT)
Dept: RADIATION ONCOLOGY | Facility: CLINIC | Age: 64
End: 2019-02-20
Attending: RADIOLOGY
Payer: COMMERCIAL

## 2019-02-20 PROCEDURE — 77387 GUIDANCE FOR RADJ TX DLVR: CPT | Performed by: RADIOLOGY

## 2019-02-20 PROCEDURE — 77412 RADIATION TX DELIVERY LVL 3: CPT | Performed by: RADIOLOGY

## 2019-02-21 ENCOUNTER — APPOINTMENT (OUTPATIENT)
Dept: RADIATION ONCOLOGY | Facility: CLINIC | Age: 64
End: 2019-02-21
Attending: RADIOLOGY
Payer: COMMERCIAL

## 2019-02-21 PROCEDURE — 77387 GUIDANCE FOR RADJ TX DLVR: CPT | Performed by: RADIOLOGY

## 2019-02-21 PROCEDURE — 77412 RADIATION TX DELIVERY LVL 3: CPT | Performed by: RADIOLOGY

## 2019-02-22 ENCOUNTER — APPOINTMENT (OUTPATIENT)
Dept: RADIATION ONCOLOGY | Facility: CLINIC | Age: 64
End: 2019-02-22
Attending: RADIOLOGY
Payer: COMMERCIAL

## 2019-02-22 DIAGNOSIS — C50.912 MALIGNANT NEOPLASM OF LEFT FEMALE BREAST, UNSPECIFIED ESTROGEN RECEPTOR STATUS, UNSPECIFIED SITE OF BREAST (HCC): ICD-10-CM

## 2019-02-22 PROCEDURE — 77412 RADIATION TX DELIVERY LVL 3: CPT | Performed by: RADIOLOGY

## 2019-02-22 PROCEDURE — 77336 RADIATION PHYSICS CONSULT: CPT | Performed by: RADIOLOGY

## 2019-02-22 PROCEDURE — 77387 GUIDANCE FOR RADJ TX DLVR: CPT | Performed by: RADIOLOGY

## 2019-02-22 RX ORDER — LORAZEPAM 1 MG/1
1 TABLET ORAL EVERY 8 HOURS PRN
Qty: 90 TABLET | Refills: 0 | Status: SHIPPED | OUTPATIENT
Start: 2019-02-22 | End: 2019-04-05 | Stop reason: SDUPTHER

## 2019-02-25 ENCOUNTER — APPOINTMENT (OUTPATIENT)
Dept: RADIATION ONCOLOGY | Facility: CLINIC | Age: 64
End: 2019-02-25
Attending: RADIOLOGY
Payer: COMMERCIAL

## 2019-02-25 PROCEDURE — 77412 RADIATION TX DELIVERY LVL 3: CPT | Performed by: RADIOLOGY

## 2019-02-25 PROCEDURE — 77387 GUIDANCE FOR RADJ TX DLVR: CPT | Performed by: RADIOLOGY

## 2019-02-26 ENCOUNTER — APPOINTMENT (OUTPATIENT)
Dept: RADIATION ONCOLOGY | Facility: CLINIC | Age: 64
End: 2019-02-26
Attending: RADIOLOGY
Payer: COMMERCIAL

## 2019-02-26 PROCEDURE — 77387 GUIDANCE FOR RADJ TX DLVR: CPT | Performed by: RADIOLOGY

## 2019-02-26 PROCEDURE — 77412 RADIATION TX DELIVERY LVL 3: CPT | Performed by: RADIOLOGY

## 2019-02-26 PROCEDURE — 77334 RADIATION TREATMENT AID(S): CPT | Performed by: RADIOLOGY

## 2019-02-26 PROCEDURE — 77321 SPECIAL TELETX PORT PLAN: CPT | Performed by: RADIOLOGY

## 2019-02-27 ENCOUNTER — APPOINTMENT (OUTPATIENT)
Dept: RADIATION ONCOLOGY | Facility: CLINIC | Age: 64
End: 2019-02-27
Attending: RADIOLOGY
Payer: COMMERCIAL

## 2019-02-27 DIAGNOSIS — C50.812 MALIGNANT NEOPLASM OF OVERLAPPING SITES OF LEFT BREAST IN FEMALE, ESTROGEN RECEPTOR POSITIVE (HCC): Primary | ICD-10-CM

## 2019-02-27 DIAGNOSIS — Z17.0 MALIGNANT NEOPLASM OF OVERLAPPING SITES OF LEFT BREAST IN FEMALE, ESTROGEN RECEPTOR POSITIVE (HCC): Primary | ICD-10-CM

## 2019-02-27 PROCEDURE — 77412 RADIATION TX DELIVERY LVL 3: CPT | Performed by: RADIOLOGY

## 2019-02-27 PROCEDURE — 77387 GUIDANCE FOR RADJ TX DLVR: CPT | Performed by: RADIOLOGY

## 2019-02-27 PROCEDURE — 77417 THER RADIOLOGY PORT IMAGE(S): CPT | Performed by: RADIOLOGY

## 2019-02-28 ENCOUNTER — APPOINTMENT (OUTPATIENT)
Dept: RADIATION ONCOLOGY | Facility: CLINIC | Age: 64
End: 2019-02-28
Attending: RADIOLOGY
Payer: COMMERCIAL

## 2019-02-28 PROCEDURE — 77412 RADIATION TX DELIVERY LVL 3: CPT | Performed by: RADIOLOGY

## 2019-02-28 PROCEDURE — 77387 GUIDANCE FOR RADJ TX DLVR: CPT | Performed by: RADIOLOGY

## 2019-03-01 ENCOUNTER — APPOINTMENT (OUTPATIENT)
Dept: RADIATION ONCOLOGY | Facility: CLINIC | Age: 64
End: 2019-03-01
Attending: RADIOLOGY
Payer: COMMERCIAL

## 2019-03-01 PROCEDURE — 77336 RADIATION PHYSICS CONSULT: CPT | Performed by: RADIOLOGY

## 2019-03-01 PROCEDURE — 77412 RADIATION TX DELIVERY LVL 3: CPT | Performed by: RADIOLOGY

## 2019-03-01 PROCEDURE — 77387 GUIDANCE FOR RADJ TX DLVR: CPT | Performed by: RADIOLOGY

## 2019-03-04 ENCOUNTER — APPOINTMENT (OUTPATIENT)
Dept: RADIATION ONCOLOGY | Facility: CLINIC | Age: 64
End: 2019-03-04
Attending: RADIOLOGY
Payer: COMMERCIAL

## 2019-03-04 PROCEDURE — 77280 THER RAD SIMULAJ FIELD SMPL: CPT | Performed by: RADIOLOGY

## 2019-03-04 PROCEDURE — 77412 RADIATION TX DELIVERY LVL 3: CPT | Performed by: RADIOLOGY

## 2019-03-05 ENCOUNTER — APPOINTMENT (OUTPATIENT)
Dept: RADIATION ONCOLOGY | Facility: CLINIC | Age: 64
End: 2019-03-05
Attending: RADIOLOGY
Payer: COMMERCIAL

## 2019-03-05 PROCEDURE — 77412 RADIATION TX DELIVERY LVL 3: CPT | Performed by: RADIOLOGY

## 2019-03-05 PROCEDURE — 77331 SPECIAL RADIATION DOSIMETRY: CPT | Performed by: RADIOLOGY

## 2019-03-05 NOTE — PROGRESS NOTES
Assessment/Plan:     History of total hip arthroplasty  Sees pain management - needed new referral  Continue medications    Osteopenia of spine  Continue calcium and vitamin d    Other idiopathic scoliosis, thoracolumbar region  Follow-up pain management         Diagnoses and all orders for this visit:    History of left breast cancer  Comments:  invasive lobular ca, s/p nipple sparing mastectomy   continue radiation  follow-up oncologist - pt will ask about shingles vaccine and colonoscopy    History of total replacement of both hip joints  -     Ambulatory referral to Pain Management; Future    Injury of right foot, initial encounter  Comments:  s/p stone dropping on right foot, no pain, full ROM  patient didn't feel the need for an xray  if persists, will send for imaging          Subjective:      Patient ID: John Rodriguez is a 61 y o  female  Had US guided biopsy of left breast in October 2018  Diagnosed with stage IIIA grade 1 invasive lobular carcinoma left breast status post nipple sparing mastectomy and sentinel lymph node biopsy with axillary lymph node dissection in October 2018  PET revealed no further malignancies  Saw oncologist in December 2018 and had been started on letrozole as 1st line adjuvant endocrine therapy  Had problems with medication so was told to stop it for one week  Patient no longer taking it due to the side effects  Had bone density scan in January 2019  Was advised to take Calcium 600 mg with Vit D 800-1,000 IUs BID  Going through radiation therapy  Last session is at the end of this week  Goes back to oncologist on 3/27/19  Has been taking lorazepam which is helping with anxiety, appetite, nausea and sleep  Stone fell on right foot and bruised it  Having no pain  Can move all her toes  Patient told me Delmy Gonzales wants PCP to order shingles vaccine so patient can go  and bring to office to have it administered   She will be asking her oncologist when it is okay to get it  Had colonoscopy years ago  She will be asking oncologist when she can have colonoscopy  Does not want pap smear  Has not been sexually active in over 15 years      The following portions of the patient's history were reviewed and updated as appropriate: allergies, current medications, past family history, past medical history, past social history, past surgical history and problem list     Review of Systems   Constitutional: Negative for chills, fatigue, fever and unexpected weight change  HENT: Negative for congestion, ear pain, rhinorrhea and sore throat  Eyes: Negative for pain and visual disturbance  Respiratory: Negative for cough, chest tightness and shortness of breath  Cardiovascular: Negative for chest pain, palpitations and leg swelling  Gastrointestinal: Negative for abdominal pain, constipation, diarrhea, nausea and vomiting  Genitourinary: Negative for difficulty urinating, dysuria and urgency  Left breast tenderness   Musculoskeletal: Positive for arthralgias, back pain and gait problem  Skin: Negative for rash         +bruise on right foot   Neurological: Negative for dizziness, numbness and headaches  Psychiatric/Behavioral: Negative for behavioral problems and suicidal ideas  The patient is not nervous/anxious  Objective:      /84 (BP Location: Right arm, Patient Position: Sitting, Cuff Size: Standard)   Pulse 93   Temp 99 1 °F (37 3 °C) (Temporal)   Resp 15   Ht 5' 3 5" (1 613 m)   Wt 59 9 kg (132 lb)   SpO2 97%   BMI 23 02 kg/m²          Physical Exam   Constitutional: She is oriented to person, place, and time  She appears well-developed and well-nourished  HENT:   Head: Normocephalic and atraumatic  Right Ear: External ear normal    Left Ear: External ear normal    Nose: Nose normal    Mouth/Throat: Oropharynx is clear and moist    Eyes: Pupils are equal, round, and reactive to light   Conjunctivae and EOM are normal    Neck: Normal range of motion  Neck supple  Cardiovascular: Normal rate, regular rhythm, normal heart sounds and intact distal pulses  Pulmonary/Chest: Effort normal and breath sounds normal  There is breast tenderness  Abdominal: Soft  Bowel sounds are normal    Genitourinary: There is breast tenderness  Musculoskeletal:        Lumbar back: She exhibits decreased range of motion, tenderness, deformity and spasm  Back:         Feet:    Neurological: She is alert and oriented to person, place, and time  Skin: Skin is warm  Psychiatric: She has a normal mood and affect   Her behavior is normal

## 2019-03-06 ENCOUNTER — APPOINTMENT (OUTPATIENT)
Dept: RADIATION ONCOLOGY | Facility: CLINIC | Age: 64
End: 2019-03-06
Attending: RADIOLOGY
Payer: COMMERCIAL

## 2019-03-06 ENCOUNTER — OFFICE VISIT (OUTPATIENT)
Dept: FAMILY MEDICINE CLINIC | Facility: CLINIC | Age: 64
End: 2019-03-06

## 2019-03-06 VITALS
SYSTOLIC BLOOD PRESSURE: 138 MMHG | TEMPERATURE: 99.1 F | BODY MASS INDEX: 22.53 KG/M2 | RESPIRATION RATE: 15 BRPM | HEIGHT: 64 IN | HEART RATE: 93 BPM | OXYGEN SATURATION: 97 % | DIASTOLIC BLOOD PRESSURE: 84 MMHG | WEIGHT: 132 LBS

## 2019-03-06 DIAGNOSIS — Z96.643 HISTORY OF TOTAL REPLACEMENT OF BOTH HIP JOINTS: ICD-10-CM

## 2019-03-06 DIAGNOSIS — Z85.3 HISTORY OF LEFT BREAST CANCER: Primary | ICD-10-CM

## 2019-03-06 DIAGNOSIS — Z17.0 MALIGNANT NEOPLASM OF OVERLAPPING SITES OF LEFT BREAST IN FEMALE, ESTROGEN RECEPTOR POSITIVE (HCC): Primary | ICD-10-CM

## 2019-03-06 DIAGNOSIS — S99.921A INJURY OF RIGHT FOOT, INITIAL ENCOUNTER: ICD-10-CM

## 2019-03-06 DIAGNOSIS — C50.812 MALIGNANT NEOPLASM OF OVERLAPPING SITES OF LEFT BREAST IN FEMALE, ESTROGEN RECEPTOR POSITIVE (HCC): Primary | ICD-10-CM

## 2019-03-06 PROBLEM — M41.25 OTHER IDIOPATHIC SCOLIOSIS, THORACOLUMBAR REGION: Status: ACTIVE | Noted: 2019-03-06

## 2019-03-06 PROBLEM — M85.88 OSTEOPENIA OF SPINE: Status: ACTIVE | Noted: 2019-03-06

## 2019-03-06 PROCEDURE — 77412 RADIATION TX DELIVERY LVL 3: CPT | Performed by: RADIOLOGY

## 2019-03-06 PROCEDURE — 99214 OFFICE O/P EST MOD 30 MIN: CPT | Performed by: FAMILY MEDICINE

## 2019-03-06 RX ORDER — CEPHALEXIN 500 MG/1
500 CAPSULE ORAL EVERY 12 HOURS SCHEDULED
Qty: 14 CAPSULE | Refills: 0 | Status: SHIPPED | OUTPATIENT
Start: 2019-03-06 | End: 2019-03-13

## 2019-03-07 ENCOUNTER — APPOINTMENT (OUTPATIENT)
Dept: RADIATION ONCOLOGY | Facility: CLINIC | Age: 64
End: 2019-03-07
Attending: RADIOLOGY
Payer: COMMERCIAL

## 2019-03-07 PROCEDURE — 77412 RADIATION TX DELIVERY LVL 3: CPT | Performed by: RADIOLOGY

## 2019-03-08 ENCOUNTER — APPOINTMENT (OUTPATIENT)
Dept: RADIATION ONCOLOGY | Facility: CLINIC | Age: 64
End: 2019-03-08
Attending: RADIOLOGY
Payer: COMMERCIAL

## 2019-03-08 PROCEDURE — 77412 RADIATION TX DELIVERY LVL 3: CPT | Performed by: RADIOLOGY

## 2019-03-08 PROCEDURE — 77336 RADIATION PHYSICS CONSULT: CPT | Performed by: RADIOLOGY

## 2019-03-12 ENCOUNTER — TRANSCRIBE ORDERS (OUTPATIENT)
Dept: ADMINISTRATIVE | Facility: HOSPITAL | Age: 64
End: 2019-03-12

## 2019-03-25 ENCOUNTER — TELEPHONE (OUTPATIENT)
Dept: OTHER | Facility: OTHER | Age: 64
End: 2019-03-25

## 2019-03-25 NOTE — TELEPHONE ENCOUNTER
Patient is waiting on 3 referrals for coordinated health  1  Cancer Surgeon   2  Implant  3  Pain Management    Patient would like a call back to confirm because appointments are coming up soon

## 2019-03-26 ENCOUNTER — TELEPHONE (OUTPATIENT)
Dept: FAMILY MEDICINE CLINIC | Facility: CLINIC | Age: 64
End: 2019-03-26

## 2019-04-02 ENCOUNTER — TELEPHONE (OUTPATIENT)
Dept: FAMILY MEDICINE CLINIC | Facility: CLINIC | Age: 64
End: 2019-04-02

## 2019-04-03 ENCOUNTER — OFFICE VISIT (OUTPATIENT)
Dept: HEMATOLOGY ONCOLOGY | Facility: CLINIC | Age: 64
End: 2019-04-03
Payer: COMMERCIAL

## 2019-04-03 VITALS
HEART RATE: 80 BPM | OXYGEN SATURATION: 97 % | WEIGHT: 131.2 LBS | DIASTOLIC BLOOD PRESSURE: 80 MMHG | RESPIRATION RATE: 16 BRPM | TEMPERATURE: 98.8 F | HEIGHT: 64 IN | BODY MASS INDEX: 22.4 KG/M2 | SYSTOLIC BLOOD PRESSURE: 130 MMHG

## 2019-04-03 DIAGNOSIS — C50.912 MALIGNANT NEOPLASM OF LEFT BREAST IN FEMALE, ESTROGEN RECEPTOR POSITIVE, UNSPECIFIED SITE OF BREAST (HCC): Primary | ICD-10-CM

## 2019-04-03 DIAGNOSIS — Z17.0 MALIGNANT NEOPLASM OF LEFT BREAST IN FEMALE, ESTROGEN RECEPTOR POSITIVE, UNSPECIFIED SITE OF BREAST (HCC): Primary | ICD-10-CM

## 2019-04-03 DIAGNOSIS — M85.88 OSTEOPENIA OF SPINE: ICD-10-CM

## 2019-04-03 PROCEDURE — 99213 OFFICE O/P EST LOW 20 MIN: CPT | Performed by: NURSE PRACTITIONER

## 2019-04-05 ENCOUNTER — RADIATION ONCOLOGY FOLLOW-UP (OUTPATIENT)
Dept: RADIATION ONCOLOGY | Facility: CLINIC | Age: 64
End: 2019-04-05
Attending: RADIOLOGY
Payer: COMMERCIAL

## 2019-04-05 VITALS
RESPIRATION RATE: 18 BRPM | SYSTOLIC BLOOD PRESSURE: 124 MMHG | TEMPERATURE: 99.2 F | BODY MASS INDEX: 22.06 KG/M2 | HEIGHT: 64 IN | DIASTOLIC BLOOD PRESSURE: 66 MMHG | WEIGHT: 129.2 LBS | HEART RATE: 64 BPM

## 2019-04-05 DIAGNOSIS — C50.912 MALIGNANT NEOPLASM OF LEFT FEMALE BREAST, UNSPECIFIED ESTROGEN RECEPTOR STATUS, UNSPECIFIED SITE OF BREAST (HCC): ICD-10-CM

## 2019-04-05 DIAGNOSIS — C50.812 MALIGNANT NEOPLASM OF OVERLAPPING SITES OF LEFT BREAST IN FEMALE, ESTROGEN RECEPTOR POSITIVE (HCC): Primary | ICD-10-CM

## 2019-04-05 DIAGNOSIS — Z17.0 MALIGNANT NEOPLASM OF OVERLAPPING SITES OF LEFT BREAST IN FEMALE, ESTROGEN RECEPTOR POSITIVE (HCC): Primary | ICD-10-CM

## 2019-04-05 PROCEDURE — 99215 OFFICE O/P EST HI 40 MIN: CPT | Performed by: RADIOLOGY

## 2019-04-05 RX ORDER — LORAZEPAM 1 MG/1
1 TABLET ORAL EVERY 8 HOURS PRN
Qty: 90 TABLET | Refills: 0 | Status: SHIPPED | OUTPATIENT
Start: 2019-04-05 | End: 2019-05-06 | Stop reason: SDUPTHER

## 2019-05-06 DIAGNOSIS — C50.912 MALIGNANT NEOPLASM OF LEFT FEMALE BREAST, UNSPECIFIED ESTROGEN RECEPTOR STATUS, UNSPECIFIED SITE OF BREAST (HCC): ICD-10-CM

## 2019-05-06 RX ORDER — LORAZEPAM 1 MG/1
1 TABLET ORAL EVERY 8 HOURS PRN
Qty: 90 TABLET | Refills: 0 | Status: SHIPPED | OUTPATIENT
Start: 2019-05-06 | End: 2019-06-05 | Stop reason: SDUPTHER

## 2019-06-03 PROBLEM — Z85.3 HISTORY OF CANCER OF LEFT BREAST: Status: ACTIVE | Noted: 2019-06-03

## 2019-06-05 ENCOUNTER — OFFICE VISIT (OUTPATIENT)
Dept: FAMILY MEDICINE CLINIC | Facility: CLINIC | Age: 64
End: 2019-06-05

## 2019-06-05 ENCOUNTER — TELEPHONE (OUTPATIENT)
Dept: FAMILY MEDICINE CLINIC | Facility: CLINIC | Age: 64
End: 2019-06-05

## 2019-06-05 VITALS
HEART RATE: 79 BPM | WEIGHT: 130 LBS | TEMPERATURE: 97.8 F | DIASTOLIC BLOOD PRESSURE: 84 MMHG | BODY MASS INDEX: 22.67 KG/M2 | OXYGEN SATURATION: 99 % | RESPIRATION RATE: 18 BRPM | SYSTOLIC BLOOD PRESSURE: 142 MMHG

## 2019-06-05 DIAGNOSIS — M41.25 OTHER IDIOPATHIC SCOLIOSIS, THORACOLUMBAR REGION: ICD-10-CM

## 2019-06-05 DIAGNOSIS — M85.88 OSTEOPENIA OF SPINE: ICD-10-CM

## 2019-06-05 DIAGNOSIS — Z17.0 MALIGNANT NEOPLASM OF OVERLAPPING SITES OF LEFT BREAST IN FEMALE, ESTROGEN RECEPTOR POSITIVE (HCC): Primary | ICD-10-CM

## 2019-06-05 DIAGNOSIS — C50.812 MALIGNANT NEOPLASM OF OVERLAPPING SITES OF LEFT BREAST IN FEMALE, ESTROGEN RECEPTOR POSITIVE (HCC): Primary | ICD-10-CM

## 2019-06-05 DIAGNOSIS — C50.912 MALIGNANT NEOPLASM OF LEFT FEMALE BREAST, UNSPECIFIED ESTROGEN RECEPTOR STATUS, UNSPECIFIED SITE OF BREAST (HCC): ICD-10-CM

## 2019-06-05 PROCEDURE — 99214 OFFICE O/P EST MOD 30 MIN: CPT | Performed by: FAMILY MEDICINE

## 2019-06-05 RX ORDER — LIDOCAINE 50 MG/G
OINTMENT TOPICAL AS NEEDED
Qty: 35.44 G | Refills: 0 | Status: SHIPPED | OUTPATIENT
Start: 2019-06-05 | End: 2021-03-25 | Stop reason: SDUPTHER

## 2019-06-05 RX ORDER — LORAZEPAM 1 MG/1
1 TABLET ORAL EVERY 8 HOURS PRN
Qty: 90 TABLET | Refills: 0 | Status: SHIPPED | OUTPATIENT
Start: 2019-06-05 | End: 2019-07-02 | Stop reason: SDUPTHER

## 2019-07-02 DIAGNOSIS — C50.912 MALIGNANT NEOPLASM OF LEFT FEMALE BREAST, UNSPECIFIED ESTROGEN RECEPTOR STATUS, UNSPECIFIED SITE OF BREAST (HCC): ICD-10-CM

## 2019-07-05 RX ORDER — LORAZEPAM 1 MG/1
1 TABLET ORAL EVERY 8 HOURS PRN
Qty: 90 TABLET | Refills: 0 | Status: SHIPPED | OUTPATIENT
Start: 2019-07-05 | End: 2019-08-02 | Stop reason: SDUPTHER

## 2019-08-02 DIAGNOSIS — C50.912 MALIGNANT NEOPLASM OF LEFT FEMALE BREAST, UNSPECIFIED ESTROGEN RECEPTOR STATUS, UNSPECIFIED SITE OF BREAST (HCC): ICD-10-CM

## 2019-08-03 RX ORDER — LORAZEPAM 1 MG/1
1 TABLET ORAL EVERY 8 HOURS PRN
Qty: 90 TABLET | Refills: 0 | Status: SHIPPED | OUTPATIENT
Start: 2019-08-03 | End: 2019-09-03 | Stop reason: SDUPTHER

## 2019-08-04 NOTE — TELEPHONE ENCOUNTER
Reviewed record  Request is consistent with Dr Del Real's prescribing pattern  Will reorder Kaitlyn Oconnell

## 2019-09-03 DIAGNOSIS — C50.912 MALIGNANT NEOPLASM OF LEFT FEMALE BREAST, UNSPECIFIED ESTROGEN RECEPTOR STATUS, UNSPECIFIED SITE OF BREAST (HCC): ICD-10-CM

## 2019-09-04 RX ORDER — LORAZEPAM 1 MG/1
1 TABLET ORAL EVERY 8 HOURS PRN
Qty: 90 TABLET | Refills: 0 | Status: SHIPPED | OUTPATIENT
Start: 2019-09-04 | End: 2019-10-01 | Stop reason: SDUPTHER

## 2019-09-26 ENCOUNTER — TELEPHONE (OUTPATIENT)
Dept: HEMATOLOGY ONCOLOGY | Facility: CLINIC | Age: 64
End: 2019-09-26

## 2019-09-26 NOTE — TELEPHONE ENCOUNTER
Called patient to reschedule her f/u apt on Thurs 10/10/19 w/Dr Brie Love, as Dr Brie oLve is in [place] on Thursdays  Patient stated to cancel the apt, and she currently does not want to reschedule  Pt states she will call the office if she changes her mind

## 2019-09-27 ENCOUNTER — TRANSCRIBE ORDERS (OUTPATIENT)
Dept: ADMINISTRATIVE | Facility: HOSPITAL | Age: 64
End: 2019-09-27

## 2019-09-27 DIAGNOSIS — R07.81 PLEURITIC CHEST PAIN: Primary | ICD-10-CM

## 2019-10-01 DIAGNOSIS — C50.912 MALIGNANT NEOPLASM OF LEFT FEMALE BREAST, UNSPECIFIED ESTROGEN RECEPTOR STATUS, UNSPECIFIED SITE OF BREAST (HCC): ICD-10-CM

## 2019-10-05 ENCOUNTER — TELEPHONE (OUTPATIENT)
Dept: OTHER | Facility: OTHER | Age: 64
End: 2019-10-05

## 2019-10-07 RX ORDER — LORAZEPAM 1 MG/1
1 TABLET ORAL EVERY 8 HOURS PRN
Qty: 90 TABLET | Refills: 0 | Status: SHIPPED | OUTPATIENT
Start: 2019-10-07 | End: 2019-10-31 | Stop reason: SDUPTHER

## 2019-10-07 NOTE — TELEPHONE ENCOUNTER
Pt LM on referral line for referral for [facility] and it was done earlier today

## 2019-10-17 ENCOUNTER — ONCOLOGY SURVIVORSHIP (OUTPATIENT)
Dept: RADIATION ONCOLOGY | Facility: CLINIC | Age: 64
End: 2019-10-17

## 2019-10-17 ENCOUNTER — CLINICAL SUPPORT (OUTPATIENT)
Dept: RADIATION ONCOLOGY | Facility: CLINIC | Age: 64
End: 2019-10-17
Attending: RADIOLOGY
Payer: COMMERCIAL

## 2019-10-17 VITALS
WEIGHT: 132.28 LBS | SYSTOLIC BLOOD PRESSURE: 138 MMHG | HEART RATE: 67 BPM | TEMPERATURE: 97.8 F | BODY MASS INDEX: 22.58 KG/M2 | OXYGEN SATURATION: 98 % | HEIGHT: 64 IN | RESPIRATION RATE: 18 BRPM | DIASTOLIC BLOOD PRESSURE: 80 MMHG

## 2019-10-17 DIAGNOSIS — C50.812 MALIGNANT NEOPLASM OF OVERLAPPING SITES OF LEFT BREAST IN FEMALE, ESTROGEN RECEPTOR POSITIVE (HCC): Primary | ICD-10-CM

## 2019-10-17 DIAGNOSIS — Z17.0 MALIGNANT NEOPLASM OF OVERLAPPING SITES OF LEFT BREAST IN FEMALE, ESTROGEN RECEPTOR POSITIVE (HCC): Primary | ICD-10-CM

## 2019-10-17 PROCEDURE — 99211 OFF/OP EST MAY X REQ PHY/QHP: CPT | Performed by: RADIOLOGY

## 2019-10-17 NOTE — PROGRESS NOTES
Survivorship treatment summary and care plan provided to patient and reviewed  All questions were answered and form signed  Copies made and sent to PCP on record and to [email or web address] Fax for scanning to be placed into electronic chart

## 2019-10-17 NOTE — PROGRESS NOTES
Thu Perdomo 1955 is a 59 y o  female       Follow up visit   Thu Perdomo is a 59year old female with a history of stage IIIA (pathologic prognostic stage group; IB) grade 1 invasive lobular carcinoma left breast status post nipple sparing mastectomy and sentinel lymph node biopsy with axillary lymph node dissection  She declined postoperative adjuvant chemotherapy   She did consent to adjuvant hormonal therapy and was given prescription for letrozole 2 5 mg daily   She discontinued letrozole the end of January 2019 due to adverse effects  She completed radiation therapy on March 8, 2019  She returns for routine f/u appt today  She was last seen 4/2019  She was considering having removal of her tissue expander with placement of permanent implant in June 2019 6/25/19 She underwent a left breast tissue expander removal,open capsulectomy, gel implant insertion  Right mastopexy for symmetry by Dr Marylou Stanton  She was healing well when seen in f/u in July 9/11/19 f/u with Dr Marylou Stanton, implant was in good position  9/27/19 she had a f/u with  Dr Marylou Stanton  Right mammogram 9/11/19, showed no evidence of malignancy  She has concerns about continued left anterior rib pain, just below breast  She was ordered a CXR, which showed: no osseous lesion or fracture of the left ribs  Nodular density of left lung base  Pt was to see Dr Juan Manuel Sutton, and his office called her to reschedule, but pt canceled her appt,declined to reschedule  Today, she reports continued left breast and left rib pain, she feels like there's a band around her left upper arm  She states she went for chest CT on Monday, 10/14/19 for follow up on lung nodule  10/24/19 Bone scan  Next right mammo due 9/2020           Malignant neoplasm of left breast in female, estrogen receptor positive ([facility] [address] )    9/19/2018 Initial Diagnosis     Malignant neoplasm of left breast in female, estrogen receptor positive ([address] ) 9/19/2018 Biopsy     Left breast biopsy x3:  Invasive lobular carcinoma, grade 1/3  ER >90%  NC 90%  HER2 negative      10/9/2018 Surgery     Left sided nipple sparing total mastectomy  Left sided sentinel node mapping and deep biopsy with completion total axillary lymph node dissection  A   Left breast, mastectomy:  - Infiltrating lobular carcinoma, classic type, grade 1/3  - Tumor size:  Estimated to be greater than 5 cm in greatest dimension  - Tumor is seen at or within 1 mm of soft tissue margins, particularly upper outer and lower outer margins   - Extensive lobular carcinoma in situ is also seen  - Lymphatic/vascular invasion is seen  - See synoptic report for further details     B  Left axillary lymph nodes:  - Nine lymph nodes identified showing no metastatic tumor      A  [place] lymph node #1:  - Metastatic carcinoma identified in 2 of 4 lymph nodes with metastatic foci greater 2 mm and without extracapsular extension     B  [place] lymph node #2:  - One lymph node identified showing metastatic carcinoma the metastasis being greater than 2 mm and without extracapsular extension    C   Retroareolar tissue:    - Benign breast tissue showing no atypia or malignancy    - Dr Marcin Cratwright  - Dr Long Coto      10/9/2018 -  Cancer Staged     Stage IIIA - pT3, pN1a, cM0, G1         12/2018 - 1/23/2019 Hormone Therapy     Letrozole 2 5 mg daily   Discontinued due to adverse effects (reported tongue swelling, throat pain upset stomach, and muscle and joint pain)  - Dr Torie Abbasi      1/28/2019 - 3/8/2019 Radiation     Plan ID Energy Fractions Dose per Fraction (cGy) Dose Correction (cGy) Total Dose Delivered (cGy) Elapsed Days   BH L CW 6X 13 / 13 200 0 2,600 32   BH L CW Lm 6X 12 / 12 200 0 2,400 30   BH L CW Boost 6E 5 / 5 200 0 1,000 4   BH L PAB 10X 25 / 25 33 0 825 32   BH L Sclav 10X 25 / 25 200 0 5,000 28      - Dr Isaías Nash      6/25/2019 Surgery     Left breast tissue expander removal  open capsulectomy,gel implant insertion  Right mastopexy for symmetry         Clinical Trial: no    Imaging    Health Maintenance   Topic Date Due    Hepatitis C Screening  1955    CRC Screening: Colonoscopy  1955    Cervical Cancer Screening  09/04/1976    INFLUENZA VACCINE  07/01/2019    Pneumococcal Vaccine: Pediatrics (0 to 5 Years) and At-Risk Patients (6 to 59 Years) (2 of 3 - PCV13) 09/04/2021 (Originally 10/3/2017)    Depression Screening PHQ  04/05/2020    BMI: Adult  06/05/2020    Pneumococcal Vaccine: 65+ Years (1 of 2 - PCV13) 09/04/2020    MAMMOGRAM  09/11/2020    DTaP,Tdap,and Td Vaccines (2 - Td) 10/28/2023    HEPATITIS B VACCINES  Aged Out       Patient Active Problem List   Diagnosis    History of total hip arthroplasty    Chronic deep vein thrombosis (DVT) of distal vein of right lower extremity (HCC)    Malignant neoplasm of left breast in female, estrogen receptor positive (HCC)    Osteopenia of spine    Other idiopathic scoliosis, thoracolumbar region    History of cancer of left breast     Past Medical History:   Diagnosis Date    Breast cancer ([address] )     DJD (degenerative joint disease)     History of blood clots     Osteoarthritis     left hip    Osteoarthritis     Pain      Past Surgical History:   Procedure Laterality Date    BREAST IMPLANT Left 06/25/2019    Left breast tissue expander removal  open capsulectomy,gel implant insertion  Dr ETHAN Hernandez MASTOPEXY Right 06/25/2019    Right mastopexy for symmetry    OVARY SURGERY      TOTAL HIP ARTHROPLASTY      US GUIDANCE BREAST BIOPSY LEFT EACH ADDITIONAL Left 9/19/2018    US GUIDANCE BREAST BIOPSY LEFT EACH ADDITIONAL Left 9/19/2018    US GUIDED BREAST BIOPSY LEFT COMPLETE Left 9/19/2018     Family History   Problem Relation Age of Onset    Breast cancer Mother 43    Lymphoma Mother     Breast cancer Maternal Aunt      Social History     Socioeconomic History    Marital status:       Spouse name: Not on file    Number of children: Not on file    Years of education: Not on file    Highest education level: Not on file   Occupational History    Not on file   Social Needs    Financial resource strain: Not on file    Food insecurity:     Worry: Not on file     Inability: Not on file    Transportation needs:     Medical: Not on file     Non-medical: Not on file   Tobacco Use    Smoking status: Never Smoker    Smokeless tobacco: Never Used   Substance and Sexual Activity    Alcohol use: No    Drug use: No    Sexual activity: Not on file   Lifestyle    Physical activity:     Days per week: Not on file     Minutes per session: Not on file    Stress: Not on file   Relationships    Social connections:     Talks on phone: Not on file     Gets together: Not on file     Attends Hoahaoism service: Not on file     Active member of club or organization: Not on file     Attends meetings of clubs or organizations: Not on file     Relationship status: Not on file    Intimate partner violence:     Fear of current or ex partner: Not on file     Emotionally abused: Not on file     Physically abused: Not on file     Forced sexual activity: Not on file   Other Topics Concern    Not on file   Social History Narrative    Not on file       Current Outpatient Medications:     aspirin 325 mg tablet, Take 325 mg by mouth as needed for mild pain, Disp: , Rfl:     Calcium-Phosphorus-Vitamin D (CALCIUM GUMMIES PO), Take 1 capsule by mouth 3 (three) times a day, Disp: , Rfl:     glycerin adult 2 g suppository, Insert 1 suppository into the rectum as needed for constipation, Disp: , Rfl:     HYDROmorphone (DILAUDID) 4 mg tablet, take 1 tablet (4MG)  by oral route  every 4 - 6 hours as needed, Disp: , Rfl:     lidocaine (XYLOCAINE) 5 % ointment, Apply topically as needed for mild pain, Disp: 35 44 g, Rfl: 0    LORazepam (ATIVAN) 1 mg tablet, Take 1 tablet (1 mg total) by mouth every 8 (eight) hours as needed for anxiety, Disp: 90 tablet, Rfl: 0    Morphine Sulfate ER 30 MG TBEA, Take 1 tablet by mouth 3 (three) times a day, Disp: , Rfl:     multivitamin (THERAGRAN) TABS, Take 1 tablet by mouth daily, Disp: , Rfl:     Cholecalciferol (VITAMIN D3 PO), Take 1 capsule by mouth daily, Disp: , Rfl:   Allergies   Allergen Reactions    Triamcinolone Rash and Blisters    Acetaminophen      Other reaction(s): temp paralysis, red flushing ski    Clindamycin GI Intolerance    Corticosteroids      Oral and IV    Ibuprofen     Lyrica [Pregabalin]     Penicillins Other (See Comments)    Prochlorperazine Other (See Comments)    Neurontin [Gabapentin] Rash       Review of Systems:  Review of Systems   Constitutional: Positive for appetite change (varies, has intermittent nausea) and fatigue  HENT: Negative  Eyes: Positive for visual disturbance (wears glasses)  Respiratory: Negative  Cardiovascular: Negative  Gastrointestinal: Positive for nausea (intermittent)  Endocrine: Negative  Genitourinary: Negative  Musculoskeletal: Positive for back pain (lower back)  Bilateral hip pain   Skin: Negative  Allergic/Immunologic: Negative  Neurological: Positive for numbness (numbness of 4th and 5th finger, left hand)  Hematological: Negative  Psychiatric/Behavioral: Positive for sleep disturbance (secondary to pain)  The patient is nervous/anxious  Anxious       Vitals:    10/17/19 1426   BP: 138/80   Pulse: 67   Resp: 18   Temp: 97 8 °F (36 6 °C)   SpO2: 98%   Weight: 60 kg (132 lb 4 4 oz)   Height: 5' 3 5" (1 613 m)        Pain assessment:6    Imaging:No results found      Teaching

## 2019-10-17 NOTE — PROGRESS NOTES
Follow-up - Radiation Oncology   Mao Mello 1955 59 y o  female [id number]      History of Present Illness   Cancer Staging  Malignant neoplasm of left breast in female, estrogen receptor positive ([facility] [address] )  Staging form: Breast, AJCC 8th Edition  - Clinical stage from 1/3/2019: Stage IIA (cT3, cN1, cM0, G1, ER: Positive, [place]: Positive, HER2: Negative) - Signed by Gino Rahman MD on 1/5/2019  Laterality: Left  Histologic grading system: 3 grade system  - Pathologic stage from 1/3/2019: Stage IB (pT3, pN1a, cM0, G1, ER: Positive, [place]: Positive, HER2: Negative) - Signed by Gino Rahman MD on 1/5/2019  Neoadjuvant therapy: No  Laterality: Left  Histologic grading system: 3 grade system  Stage used in treatment planning: Yes  National guidelines used in treatment planning: Yes      Mao Mello is a 59y o  year old female with a history of stage IIIA (pathologic prognostic stage group; IB) grade 1 invasive lobular carcinoma left breast status post nipple sparing mastectomy and sentinel lymph node biopsy with axillary lymph node dissection  She declined postoperative adjuvant chemotherapy   She did consent to adjuvant hormonal therapy and was given prescription for letrozole 2 5 mg daily   She discontinued letrozole the end of January 2019 due to adverse effects  She completed radiation therapy on March 8, 2019  She returns for routine f/u appt today  She was last seen 4/2019  She was considering having removal of her tissue expander with placement of permanent implant in June 2019  Interval History:   6/25/19 She underwent a left breast tissue expander removal,open capsulectomy, gel implant insertion  Right mastopexy for symmetry by Dr Lisa Goltz  She was healing well when seen in f/u in July 9/11/19 f/u with Dr Lisa Goltz, implant was in good position      9/27/19 she had a f/u with  Dr Lisa Goltz       Right mammogram 9/11/19, showed no evidence of malignancy     She has concerns about continued left anterior rib pain, just below breast  She was ordered a CXR, which showed: no osseous lesion or fracture of the left ribs  Nodular density of left lung base       Pt was to see Dr Megha Stahl, and his office called her to reschedule, but pt canceled her appt,declined to reschedule       Today, she reports continued left breast and left rib pain below the breast, she feels like there's a band around her left upper arm  She continues with breast massage and overall there is less left breast pain and discomfort  There is also much less erythema  She has occasional pruritus  There is no lymphedema of the left upper extremity  She states she went for chest CT on Monday, 10/14/19 for follow up on lung nodule       10/24/19 Bone scan  Next right mammo due 9/2020  Historical Information      Malignant neoplasm of left breast in female, estrogen receptor positive ([facility] [address] )    9/19/2018 Initial Diagnosis     Malignant neoplasm of left breast in female, estrogen receptor positive ([facility] [address] )      9/19/2018 Biopsy     Left breast biopsy x3:  Invasive lobular carcinoma, grade 1/3  ER >90%  DE 90%  HER2 negative      10/9/2018 Surgery     Left sided nipple sparing total mastectomy  Left sided sentinel node mapping and deep biopsy with completion total axillary lymph node dissection  A   Left breast, mastectomy:  - Infiltrating lobular carcinoma, classic type, grade 1/3  - Tumor size:  Estimated to be greater than 5 cm in greatest dimension  - Tumor is seen at or within 1 mm of soft tissue margins, particularly upper outer and lower outer margins   - Extensive lobular carcinoma in situ is also seen  - Lymphatic/vascular invasion is seen  - See synoptic report for further details     B  Left axillary lymph nodes:  - Nine lymph nodes identified showing no metastatic tumor      A   [place] lymph node #1:  - Metastatic carcinoma identified in 2 of 4 lymph nodes with metastatic foci greater 2 mm and without extracapsular extension     B  [place] lymph node #2:  - One lymph node identified showing metastatic carcinoma the metastasis being greater than 2 mm and without extracapsular extension    C  Retroareolar tissue:    - Benign breast tissue showing no atypia or malignancy    - Dr Emani Wei  - Dr Herlinda Ruiz      10/9/2018 -  Cancer Staged     Stage IIIA - pT3, pN1a, cM0, G1         12/2018 - 1/23/2019 Hormone Therapy     Letrozole 2 5 mg daily   Discontinued due to adverse effects (reported tongue swelling, throat pain upset stomach, and muscle and joint pain)  - Dr Anup Fritz      1/28/2019 - 3/8/2019 Radiation     Plan ID Energy Fractions Dose per Fraction (cGy) Dose Correction (cGy) Total Dose Delivered (cGy) Elapsed Days   BH L CW 6X 13 / 13 200 0 2,600 32   BH L CW Lm 6X 12 / 12 200 0 2,400 30   BH L CW Boost 6E 5 / 5 200 0 1,000 4   BH L PAB 10X 25 / 25 33 0 825 32   BH L Sclav 10X 25 / 25 200 0 5,000 28      - Dr Yuan Kelly      6/25/2019 Surgery     Left breast tissue expander removal  open capsulectomy,gel implant insertion  Right mastopexy for symmetry         Past Medical History:   Diagnosis Date    Breast cancer ([address] )     DJD (degenerative joint disease)     History of blood clots     Osteoarthritis     left hip    Osteoarthritis     Pain      Past Surgical History:   Procedure Laterality Date    BREAST IMPLANT Left 06/25/2019    Left breast tissue expander removal  open capsulectomy,gel implant insertion   Dr ETHAN Gerardo MASTOPEXY Right 06/25/2019    Right mastopexy for symmetry    OVARY SURGERY      TOTAL HIP ARTHROPLASTY      US GUIDANCE BREAST BIOPSY LEFT EACH ADDITIONAL Left 9/19/2018    US GUIDANCE BREAST BIOPSY LEFT EACH ADDITIONAL Left 9/19/2018    US GUIDED BREAST BIOPSY LEFT COMPLETE Left 9/19/2018       Social History   Social History     Substance and Sexual Activity   Alcohol Use No     Social History     Substance and Sexual Activity   Drug Use No     Social History Tobacco Use   Smoking Status Never Smoker   Smokeless Tobacco Never Used     Meds/Allergies     Current Outpatient Medications:     aspirin 325 mg tablet, Take 325 mg by mouth as needed for mild pain, Disp: , Rfl:     Calcium-Phosphorus-Vitamin D (CALCIUM GUMMIES PO), Take 1 capsule by mouth 3 (three) times a day, Disp: , Rfl:     glycerin adult 2 g suppository, Insert 1 suppository into the rectum as needed for constipation, Disp: , Rfl:     HYDROmorphone (DILAUDID) 4 mg tablet, take 1 tablet (4MG)  by oral route  every 4 - 6 hours as needed, Disp: , Rfl:     lidocaine (XYLOCAINE) 5 % ointment, Apply topically as needed for mild pain, Disp: 35 44 g, Rfl: 0    LORazepam (ATIVAN) 1 mg tablet, Take 1 tablet (1 mg total) by mouth every 8 (eight) hours as needed for anxiety, Disp: 90 tablet, Rfl: 0    Morphine Sulfate ER 30 MG TBEA, Take 1 tablet by mouth 3 (three) times a day, Disp: , Rfl:     multivitamin (THERAGRAN) TABS, Take 1 tablet by mouth daily, Disp: , Rfl:     Cholecalciferol (VITAMIN D3 PO), Take 1 capsule by mouth daily, Disp: , Rfl:   Allergies   Allergen Reactions    Triamcinolone Rash and Blisters    Acetaminophen      Other reaction(s): temp paralysis, red flushing ski    Clindamycin GI Intolerance    Corticosteroids      Oral and IV    Ibuprofen     Lyrica [Pregabalin]     Penicillins Other (See Comments)    Prochlorperazine Other (See Comments)    Neurontin [Gabapentin] Rash     Review of Systems   Constitutional: Positive for appetite change (varies, has intermittent nausea) and fatigue  HENT: Negative  Eyes: Positive for visual disturbance (wears glasses)  Respiratory: Negative  Cardiovascular: Negative  Gastrointestinal: Positive for nausea (intermittent)  Endocrine: Negative  Genitourinary: Negative  Musculoskeletal: Positive for back pain (lower back)  Bilateral hip pain   Skin: Negative  Allergic/Immunologic: Negative      Neurological: Positive for numbness (numbness of 4th and 5th finger, left hand)  Hematological: Negative  Psychiatric/Behavioral: Positive for sleep disturbance (secondary to pain)  The patient is nervous/anxious  Anxious     OBJECTIVE:   /80   Pulse 67   Temp 97 8 °F (36 6 °C)   Resp 18   Ht 5' 3 5" (1 613 m)   Wt 60 kg (132 lb 4 4 oz)   SpO2 98%   BMI 23 06 kg/m²   Pain Assessment:  6  ECOG/Zubrod/WHO: 1 - Symptomatic but completely ambulatory    Physical Exam   Constitutional: She is oriented to person, place, and time  She appears well-developed and well-nourished  No distress  HENT:   Head: Normocephalic and atraumatic  Mouth/Throat: No oropharyngeal exudate  Eyes: Pupils are equal, round, and reactive to light  Conjunctivae and EOM are normal  No scleral icterus  Neck: Normal range of motion  Neck supple  No tracheal deviation present  No thyromegaly present  Cardiovascular: Normal rate, regular rhythm and normal heart sounds  Pulmonary/Chest: Effort normal and breath sounds normal  No respiratory distress  She has no wheezes  She has no rales  She exhibits no mass and no tenderness  Right breast exhibits no inverted nipple, no mass, no nipple discharge, no skin change and no tenderness  There is a well-healed left mastectomy incision with underlying gel implant intact and no evidence of any nodules or masses  There is some post radiation changes and hyperpigmentation without any skin breakdown  Abdominal: Soft  Bowel sounds are normal  She exhibits no distension and no mass  There is no tenderness  Musculoskeletal: Normal range of motion  She exhibits no edema or tenderness  Lymphadenopathy:     She has no cervical adenopathy  She has no axillary adenopathy  Right: No supraclavicular adenopathy present  Left: No supraclavicular adenopathy present  Neurological: She is alert and oriented to person, place, and time  No cranial nerve deficit   Coordination normal    Skin: Skin is warm and dry  No rash noted  She is not diaphoretic  No erythema  No pallor  Psychiatric: She has a normal mood and affect  Her behavior is normal  Judgment and thought content normal    Nursing note and vitals reviewed  RESULTS    Lab Results: No results found for this or any previous visit (from the past 672 hour(s))  Imaging Studies:No results found  Assessment/Plan:  No orders of the defined types were placed in this encounter  Romana Huntley is a 59y o  year old female with a history of an anatomic stage IIIA (pathologic prognostic stage group; IB) grade 1 invasive lobular carcinoma left breast status post nipple sparing mastectomy and sentinel lymph node biopsy with axillary lymph node dissection   There was a close margin in the upper outer and lower outer soft tissue   Due to initial size of her tumor being estimated at greater than 5 cm along with tumor being seen within 1 mm of the soft tissue margins as well as 3 lymph nodes out of 14 lymph nodes being positive for metastatic disease with lymphatic and vascular invasion, she is at increased risk for locally recurrent disease  We recommended postmastectomy radiation therapy to the reconstructed left chest wall, axillary, and supraclavicular regions   She was seen by Dr Debby Watson and declined postoperative adjuvant chemotherapy due to the potential side effects   She did consent to adjuvant hormonal therapy and was given prescription for letrozole 2 5 mg daily   She discontinued letrozole the end of January 2019 due to adverse effects  She completed radiation therapy on March 8, 2019       She returns today for follow up after completing radiation therapy  She continues to have some left breast as well as underlying left chest wall/rib discomfort that extends into the left upper arm at times  She has no lymphedema     She will continue to apply moisturizer and massage the reconstructed left chest wall on a daily basis   She had removal of her tissue expander with placement of permanent gel implant in June 2019 along with right breast mastopexy for symmetry  She was seen by medical oncology April 3, 2019 and declined trying any other aromatase inhibitors  She continues to decline any aromatase inhibitors  Her chest CT from October 14th 2019 revealed a 6 mm calcified granuloma in the left lower lobe accounting for the finding noted on her recent chest x-ray  There was evidence of left mastectomy with axillary lymph node dissection and placement of implant as well as some left apical scarring  Her PET-CT study from October 24, 2018 also revealed a left lower lobe calcified granuloma  Results were discussed today with the patient  She will return here for follow-up in 6 months  Gabbie Stewart MD  10/17/2019,3:38 PM    Portions of the record may have been created with voice recognition software   Occasional wrong word or "sound a like" substitutions may have occurred due to the inherent limitations of voice recognition software   Read the chart carefully and recognize, using context, where substitutions have occurred

## 2019-10-24 ENCOUNTER — HOSPITAL ENCOUNTER (OUTPATIENT)
Dept: NUCLEAR MEDICINE | Facility: HOSPITAL | Age: 64
Discharge: HOME | End: 2019-10-24
Payer: COMMERCIAL

## 2019-10-24 DIAGNOSIS — R07.81 PLEURITIC CHEST PAIN: ICD-10-CM

## 2019-10-24 PROCEDURE — 78306 BONE IMAGING WHOLE BODY: CPT

## 2019-10-24 PROCEDURE — A9503 TC99M MEDRONATE: HCPCS

## 2019-10-30 ENCOUNTER — TELEPHONE (OUTPATIENT)
Dept: FAMILY MEDICINE CLINIC | Facility: CLINIC | Age: 64
End: 2019-10-30

## 2019-10-30 NOTE — TELEPHONE ENCOUNTER
Dr Kiran Cassette called to see if pcp received recent CT image that was faxed over 10/22  Dr Shahida Fernandes wants to know if pcp is wiling to follow CT results and if not pcp office can contact Dr Shahida Fernandes so she can contact pulmonary         [phone] Moon

## 2019-10-31 DIAGNOSIS — C50.912 MALIGNANT NEOPLASM OF LEFT FEMALE BREAST, UNSPECIFIED ESTROGEN RECEPTOR STATUS, UNSPECIFIED SITE OF BREAST (HCC): ICD-10-CM

## 2019-10-31 RX ORDER — LORAZEPAM 1 MG/1
1 TABLET ORAL EVERY 8 HOURS PRN
Qty: 90 TABLET | Refills: 0 | Status: SHIPPED | OUTPATIENT
Start: 2019-10-31 | End: 2019-12-02 | Stop reason: SDUPTHER

## 2019-10-31 NOTE — TELEPHONE ENCOUNTER
PDMP reviewed    Last filled 10/7/19    No contract/UDS found on file    Last visit note also states the following:  "Agreed to keep ativan at three times per day for now (reviewed PDMP before prescribing), will reassess at next visit and decrease to twice per day with the intention of being completely off ativan over the next 3-6 months"

## 2019-11-04 NOTE — TELEPHONE ENCOUNTER
Spoke to Vanda at [facility] who had been trying to get in touch with Moon  She will leave her a message to call me back regarding patient

## 2019-11-05 NOTE — TELEPHONE ENCOUNTER
Please let patient know CT chest showed 6 mm granuloma in left lung, seen on PET scan last year  CT also showed opacity in left lung for which repeat CT chest is recommended in 3-6 months  Will discuss further at next visit with me in December 2019   Thanks, [address]

## 2019-11-05 NOTE — TELEPHONE ENCOUNTER
Spoke to Moon who wanted to make sure I would be relaying message of most recent CT chest to patient  I told her our office will be in touch with patient

## 2019-11-06 NOTE — TELEPHONE ENCOUNTER
Pt already discussed this with her Radiology/Onc doctor, she said she will let you know what he said at her follow up

## 2019-12-02 DIAGNOSIS — C50.912 MALIGNANT NEOPLASM OF LEFT FEMALE BREAST, UNSPECIFIED ESTROGEN RECEPTOR STATUS, UNSPECIFIED SITE OF BREAST (HCC): ICD-10-CM

## 2019-12-03 RX ORDER — LORAZEPAM 1 MG/1
1 TABLET ORAL EVERY 8 HOURS PRN
Qty: 90 TABLET | Refills: 0 | Status: SHIPPED | OUTPATIENT
Start: 2019-12-03 | End: 2020-01-03 | Stop reason: ALTCHOICE

## 2019-12-26 ENCOUNTER — OFFICE VISIT (OUTPATIENT)
Dept: FAMILY MEDICINE CLINIC | Facility: CLINIC | Age: 64
End: 2019-12-26

## 2019-12-26 VITALS
HEART RATE: 72 BPM | SYSTOLIC BLOOD PRESSURE: 130 MMHG | RESPIRATION RATE: 16 BRPM | DIASTOLIC BLOOD PRESSURE: 82 MMHG | OXYGEN SATURATION: 97 % | BODY MASS INDEX: 21.97 KG/M2 | TEMPERATURE: 98 F | WEIGHT: 126 LBS

## 2019-12-26 DIAGNOSIS — M41.25 OTHER IDIOPATHIC SCOLIOSIS, THORACOLUMBAR REGION: ICD-10-CM

## 2019-12-26 DIAGNOSIS — Z12.11 COLON CANCER SCREENING: ICD-10-CM

## 2019-12-26 DIAGNOSIS — R91.1 LUNG NODULE: Primary | ICD-10-CM

## 2019-12-26 DIAGNOSIS — Z85.3 HISTORY OF CANCER OF LEFT BREAST: ICD-10-CM

## 2019-12-26 PROCEDURE — 99214 OFFICE O/P EST MOD 30 MIN: CPT | Performed by: FAMILY MEDICINE

## 2019-12-26 RX ORDER — CEPHALEXIN 500 MG/1
CAPSULE ORAL
Refills: 4 | COMMUNITY
Start: 2019-11-22 | End: 2020-08-31 | Stop reason: SDUPTHER

## 2019-12-26 NOTE — PROGRESS NOTES
Assessment/Plan:    Osteopenia of spine  Continue calcium+vitamin d    Other idiopathic scoliosis, thoracolumbar region  Follow-up with pain management who fill morphine and diluadid    History of cancer of left breast  -Follow-up oncologist  -Still feeling anxious about prognosis but does not want to be under the influence of controlled substances, patient wants to stop taking ativan and start taking xanax with the intention of one day completely being without it    Lung nodule  Needs repeat CT chest in 3 to 6 months from last CT which was in 2019  Send for repeat CT during next office visit in 2020       Diagnoses and all orders for this visit:    Lung nodule    Colon cancer screening  -     Cologuard; Future    History of cancer of left breast    Other idiopathic scoliosis, thoracolumbar region    Other orders  -     Cancel: Ambulatory referral to Gastroenterology; Future  -     cephalexin (KEFLEX) 500 mg capsule; TAKE 4 CAPSULES BY MOUTH 1 HOUR BEFORE DENTAL APPOINTMENT          Subjective:      Patient ID: Thu Perdomo is a 59 y o  female  Has not been able to travel to [place]  Sister is giving her a hard time regarding her mother's assets ever since mother  last year  Patient worried about CT lung findings  Still does not want to do colonoscopy  Would rather do cologuard  Wants to switch ativan to xanax since does not want to remain under the influence of controlled substance  When patient calls on 1/3/20, will order xanax 1 mg bid instead of ativan  Over the next few months, patient wants then to wean off xanax so she does not get dependent on it             The following portions of the patient's history were reviewed and updated as appropriate: allergies, current medications, past family history, past medical history, past social history, past surgical history and problem list     Review of Systems   Constitutional: Negative for chills, fatigue, fever and unexpected weight change  HENT: Negative for congestion, ear pain, rhinorrhea and sore throat  Eyes: Negative for pain and visual disturbance  Respiratory: Negative for cough, chest tightness and shortness of breath  Cardiovascular: Negative for chest pain, palpitations and leg swelling  Gastrointestinal: Negative for abdominal pain, constipation, diarrhea, nausea and vomiting  Genitourinary: Negative for difficulty urinating, dysuria and urgency  Musculoskeletal: Positive for back pain  Negative for arthralgias  Skin: Negative for rash  Neurological: Negative for dizziness, numbness and headaches  Psychiatric/Behavioral: Negative for behavioral problems and suicidal ideas  The patient is nervous/anxious  Objective:      /82 (BP Location: Right arm, Patient Position: Sitting, Cuff Size: Standard)   Pulse 72   Temp 98 °F (36 7 °C) (Temporal)   Resp 16   Wt 57 2 kg (126 lb)   SpO2 97%   BMI 21 97 kg/m²          Physical Exam   Constitutional: She is oriented to person, place, and time  She appears well-developed and well-nourished  HENT:   Head: Normocephalic and atraumatic  Right Ear: External ear normal    Left Ear: External ear normal    Nose: Nose normal    Mouth/Throat: Oropharynx is clear and moist    Eyes: Pupils are equal, round, and reactive to light  Conjunctivae and EOM are normal    Neck: Normal range of motion  Neck supple  Cardiovascular: Normal rate, regular rhythm, normal heart sounds and intact distal pulses  Pulmonary/Chest: Effort normal and breath sounds normal    Abdominal: Soft  Bowel sounds are normal    Musculoskeletal:        Lumbar back: She exhibits decreased range of motion, tenderness and pain  Neurological: She is alert and oriented to person, place, and time  Skin: Skin is warm  Psychiatric: She has a normal mood and affect   Her behavior is normal

## 2019-12-29 PROBLEM — R91.1 LUNG NODULE: Status: ACTIVE | Noted: 2019-12-29

## 2019-12-29 NOTE — ASSESSMENT & PLAN NOTE
-Follow-up oncologist  -Still feeling anxious about prognosis but does not want to be under the influence of controlled substances, patient wants to stop taking ativan and start taking xanax with the intention of one day completely being without it

## 2019-12-29 NOTE — ASSESSMENT & PLAN NOTE
Needs repeat CT chest in 3 to 6 months from last CT which was in October 2019  Send for repeat CT during next office visit in March 2020

## 2020-01-02 ENCOUNTER — TELEPHONE (OUTPATIENT)
Dept: FAMILY MEDICINE CLINIC | Facility: CLINIC | Age: 65
End: 2020-01-02

## 2020-01-03 DIAGNOSIS — Z85.3 HISTORY OF CANCER OF LEFT BREAST: Primary | ICD-10-CM

## 2020-01-03 RX ORDER — ALPRAZOLAM 1 MG/1
1 TABLET ORAL 2 TIMES DAILY PRN
Qty: 60 TABLET | Refills: 0 | Status: SHIPPED | OUTPATIENT
Start: 2020-01-03 | End: 2020-02-05

## 2020-01-09 ENCOUNTER — TELEPHONE (OUTPATIENT)
Dept: FAMILY MEDICINE CLINIC | Facility: CLINIC | Age: 65
End: 2020-01-09

## 2020-01-09 NOTE — TELEPHONE ENCOUNTER
Pt need new referral for pain management  She has an appt 2020  The existing referral       Thank you

## 2020-01-10 DIAGNOSIS — M41.25 OTHER IDIOPATHIC SCOLIOSIS, THORACOLUMBAR REGION: Primary | ICD-10-CM

## 2020-02-01 DIAGNOSIS — Z85.3 HISTORY OF CANCER OF LEFT BREAST: ICD-10-CM

## 2020-02-03 DIAGNOSIS — Z85.3 HISTORY OF CANCER OF LEFT BREAST: ICD-10-CM

## 2020-02-05 RX ORDER — ALPRAZOLAM 1 MG/1
1 TABLET ORAL 2 TIMES DAILY PRN
Qty: 60 TABLET | Refills: 0 | OUTPATIENT
Start: 2020-02-05

## 2020-02-05 RX ORDER — ALPRAZOLAM 1 MG/1
TABLET ORAL
Qty: 60 TABLET | Refills: 0 | Status: SHIPPED | OUTPATIENT
Start: 2020-02-05 | End: 2020-03-03 | Stop reason: SDUPTHER

## 2020-03-03 DIAGNOSIS — Z85.3 HISTORY OF CANCER OF LEFT BREAST: ICD-10-CM

## 2020-03-05 RX ORDER — ALPRAZOLAM 1 MG/1
1 TABLET ORAL 2 TIMES DAILY
Qty: 60 TABLET | Refills: 0 | Status: SHIPPED | OUTPATIENT
Start: 2020-03-05 | End: 2020-04-02 | Stop reason: SDUPTHER

## 2020-03-26 ENCOUNTER — TELEMEDICINE (OUTPATIENT)
Dept: FAMILY MEDICINE CLINIC | Facility: CLINIC | Age: 65
End: 2020-03-26

## 2020-03-26 DIAGNOSIS — R91.1 LUNG NODULE: ICD-10-CM

## 2020-03-26 DIAGNOSIS — Z96.643 HISTORY OF TOTAL REPLACEMENT OF BOTH HIP JOINTS: Primary | ICD-10-CM

## 2020-03-26 DIAGNOSIS — Z85.3 HISTORY OF CANCER OF LEFT BREAST: ICD-10-CM

## 2020-03-26 PROCEDURE — G2012 BRIEF CHECK IN BY MD/QHP: HCPCS | Performed by: FAMILY MEDICINE

## 2020-03-26 NOTE — PROGRESS NOTES
Virtual Regular Visit    Problem List Items Addressed This Visit     None               Reason for visit is follow-up and med refills  Encounter provider Jonny Ramirez DO    [facility] [address] [address] [phone]      Recent Visits  No visits were found meeting these conditions  Showing recent visits within past 7 days and meeting all other requirements     Future Appointments  No visits were found meeting these conditions  Showing future appointments within next 150 days and meeting all other requirements        After connecting through [employer], the patient was identified by name and date of birth  Purvi Soto was informed that this is a telemedicine visit and that the visit is being conducted through telephone and patient was informed that this is not a secure, HIPAA-complaint platform  she agrees to proceed  which may not be secure and therefore, might not be HIPAA-compliant  My office door was closed  No one else was in the room  She acknowledged consent and understanding of privacy and security of the video platform  The patient has agreed to participate and understands they can discontinue the visit at any time  Subjective  Purvi Soto is a 59 y o  female  Her main concern due to COVID-19 are the home aides that come four times per week but she needs the help  So she will continue to have them  Twisted her artificial right hip a few days ago  Needs to be very careful in the way she moves, walks and the way she reaches for things  Wants to work on it on her own instead of going to orthopedist during this time of COVID-19 pandemic  Wants to stay on same dose of xanax since it is helping her for now  Getting calls endlessly from [place] regarding her mother's estate  Squatters have moved around the house   Some prisoners have been released in [place] and worries there will be hussain going on at the house  The conflict with her sister is causing unnecessary stress  Denies SI/HI  In October 2019 had complained of pain above ribs so had been sent for CT chest which showed 6 mm calcified granuloma in LLL  She is to have repeat CT in 3-6 months  Wants to postpone being sent for this due to COVID-19 pandemic  Needs new referral for pain management with Dr Pj Amezcua  Has next appointment on April 9, 2020  Past Medical History:   Diagnosis Date    Breast cancer ([address] )     DJD (degenerative joint disease)     History of blood clots     Osteoarthritis     left hip    Osteoarthritis     Pain        Past Surgical History:   Procedure Laterality Date    BREAST IMPLANT Left 06/25/2019    Left breast tissue expander removal  open capsulectomy,gel implant insertion   Dr ETHAN Markham Orly MASTOPEXY Right 06/25/2019    Right mastopexy for symmetry    OVARY SURGERY      TOTAL HIP ARTHROPLASTY      US GUIDANCE BREAST BIOPSY LEFT EACH ADDITIONAL Left 9/19/2018    US GUIDANCE BREAST BIOPSY LEFT EACH ADDITIONAL Left 9/19/2018    US GUIDED BREAST BIOPSY LEFT COMPLETE Left 9/19/2018       Current Outpatient Medications   Medication Sig Dispense Refill    ALPRAZolam (XANAX) 1 mg tablet Take 1 tablet (1 mg total) by mouth 2 (two) times a day 60 tablet 0    aspirin 325 mg tablet Take 325 mg by mouth as needed for mild pain      Calcium-Phosphorus-Vitamin D (CALCIUM GUMMIES PO) Take 1 capsule by mouth 3 (three) times a day      cephalexin (KEFLEX) 500 mg capsule TAKE 4 CAPSULES BY MOUTH 1 HOUR BEFORE DENTAL APPOINTMENT  4    Cholecalciferol (VITAMIN D3 PO) Take 1 capsule by mouth daily      glycerin adult 2 g suppository Insert 1 suppository into the rectum as needed for constipation      HYDROmorphone (DILAUDID) 4 mg tablet take 1 tablet (4MG)  by oral route  every 4 - 6 hours as needed      lidocaine (XYLOCAINE) 5 % ointment Apply topically as needed for mild pain 35 44 g 0    Morphine Sulfate ER 30 MG TBEA Take 1 tablet by mouth 3 (three) times a day      multivitamin (THERAGRAN) TABS Take 1 tablet by mouth daily       No current facility-administered medications for this visit  Allergies   Allergen Reactions    Triamcinolone Rash and Blisters    Acetaminophen      Other reaction(s): temp paralysis, red flushing ski    Clindamycin GI Intolerance    Corticosteroids      Oral and IV    Ibuprofen     Lyrica [Pregabalin]     Penicillins Other (See Comments)    Prochlorperazine Other (See Comments)    Neurontin [Gabapentin] Rash       Review of Systems   Constitutional: Positive for activity change  Negative for appetite change and fever  HENT: Negative for congestion and sinus pressure  Respiratory: Negative for cough and shortness of breath  Cardiovascular: Negative for chest pain  Gastrointestinal: Negative for abdominal pain, diarrhea and vomiting  Musculoskeletal: Positive for back pain  +right hip pain   Neurological: Positive for weakness  Psychiatric/Behavioral: Positive for suicidal ideas  Negative for confusion, decreased concentration and self-injury  The patient is nervous/anxious        Assessment/Plan:      Problem List Items Addressed This Visit        Other    History of total hip arthroplasty - Primary     Needs renewal of Pain Management Referral (Dr Yolanda Ag) Has next appointment on April 9, 2020  Continue pain medications    Patient thinks she may have twisted her right hip - doing PT on own, does not want to see orthopedist due to COVID-19  Urged patient to contact our office or orthopedist if symptoms get worse         Relevant Orders    Ambulatory referral to Pain Management    History of cancer of left breast     Follow-up oncologist  Patient still wants to take xanax as of right now due to concerns regarding her prognosis and COVID-19 pandemic, eventually wants to taper and stop            Lung nodule     repeat CT chest in 3 to 6 months from last CT which was in October 2019 (6 mm calcified granuloma in LLL)  Patient prefers to have repeat CT later in the year due to concerns of leaving home due to COVID-19 epidemic                     I spent 20 minutes with the patient during this visit

## 2020-03-27 ENCOUNTER — TELEPHONE (OUTPATIENT)
Dept: FAMILY MEDICINE CLINIC | Facility: CLINIC | Age: 65
End: 2020-03-27

## 2020-03-27 NOTE — ASSESSMENT & PLAN NOTE
Follow-up oncologist  Patient still wants to take xanax as of right now due to concerns regarding her prognosis and COVID-19 pandemic, eventually wants to taper and stop

## 2020-03-27 NOTE — ASSESSMENT & PLAN NOTE
Needs renewal of Pain Management Referral (Dr Megan Disla) Has next appointment on April 9, 2020  Continue pain medications    Patient thinks she may have twisted her right hip - doing PT on own, does not want to see orthopedist due to [id number] [facility] patient to contact our office or orthopedist if symptoms get worse

## 2020-03-27 NOTE — ASSESSMENT & PLAN NOTE
repeat CT chest in 3 to 6 months from last CT which was in October 2019 (6 mm calcified granuloma in LLL)  Patient prefers to have repeat CT later in the year due to concerns of leaving home due to COVID-19 epidemic

## 2020-04-02 DIAGNOSIS — Z85.3 HISTORY OF CANCER OF LEFT BREAST: ICD-10-CM

## 2020-04-03 ENCOUNTER — TELEPHONE (OUTPATIENT)
Dept: ICU | Facility: HOSPITAL | Age: 65
End: 2020-04-03

## 2020-04-03 RX ORDER — ALPRAZOLAM 1 MG/1
1 TABLET ORAL 2 TIMES DAILY
Qty: 60 TABLET | Refills: 0 | Status: SHIPPED | OUTPATIENT
Start: 2020-04-03 | End: 2020-05-04 | Stop reason: SDUPTHER

## 2020-05-04 DIAGNOSIS — Z85.3 HISTORY OF CANCER OF LEFT BREAST: ICD-10-CM

## 2020-05-06 RX ORDER — ALPRAZOLAM 1 MG/1
1 TABLET ORAL 2 TIMES DAILY
Qty: 60 TABLET | Refills: 0 | Status: SHIPPED | OUTPATIENT
Start: 2020-05-06 | End: 2020-06-03 | Stop reason: SDUPTHER

## 2020-05-28 ENCOUNTER — TELEMEDICINE (OUTPATIENT)
Dept: FAMILY MEDICINE CLINIC | Facility: CLINIC | Age: 65
End: 2020-05-28

## 2020-05-28 DIAGNOSIS — K43.9 HERNIA OF ABDOMINAL WALL: Primary | ICD-10-CM

## 2020-05-28 DIAGNOSIS — M41.25 OTHER IDIOPATHIC SCOLIOSIS, THORACOLUMBAR REGION: ICD-10-CM

## 2020-05-28 DIAGNOSIS — R91.1 LUNG NODULE: ICD-10-CM

## 2020-05-28 DIAGNOSIS — Z85.3 HISTORY OF CANCER OF LEFT BREAST: ICD-10-CM

## 2020-05-28 PROCEDURE — 99213 OFFICE O/P EST LOW 20 MIN: CPT | Performed by: FAMILY MEDICINE

## 2020-06-03 DIAGNOSIS — Z85.3 HISTORY OF CANCER OF LEFT BREAST: ICD-10-CM

## 2020-06-06 ENCOUNTER — NURSE TRIAGE (OUTPATIENT)
Dept: OTHER | Facility: OTHER | Age: 65
End: 2020-06-06

## 2020-06-06 ENCOUNTER — TELEPHONE (OUTPATIENT)
Dept: OTHER | Facility: HOSPITAL | Age: 65
End: 2020-06-06

## 2020-06-06 RX ORDER — ALPRAZOLAM 1 MG/1
1 TABLET ORAL 2 TIMES DAILY
Qty: 60 TABLET | Refills: 0 | Status: SHIPPED | OUTPATIENT
Start: 2020-06-06 | End: 2020-07-02 | Stop reason: SDUPTHER

## 2020-06-16 ENCOUNTER — HOSPITAL ENCOUNTER (OUTPATIENT)
Dept: ULTRASOUND IMAGING | Facility: HOSPITAL | Age: 65
Discharge: HOME | End: 2020-06-16
Payer: COMMERCIAL

## 2020-06-16 DIAGNOSIS — K43.9 HERNIA OF ABDOMINAL WALL: ICD-10-CM

## 2020-06-16 PROCEDURE — 76700 US EXAM ABDOM COMPLETE: CPT

## 2020-06-25 ENCOUNTER — TELEMEDICINE (OUTPATIENT)
Dept: FAMILY MEDICINE CLINIC | Facility: CLINIC | Age: 65
End: 2020-06-25

## 2020-06-25 DIAGNOSIS — R91.1 LUNG NODULE: ICD-10-CM

## 2020-06-25 DIAGNOSIS — R11.0 NAUSEA: Primary | ICD-10-CM

## 2020-06-25 DIAGNOSIS — Z85.3 HISTORY OF CANCER OF LEFT BREAST: ICD-10-CM

## 2020-06-25 PROCEDURE — 99213 OFFICE O/P EST LOW 20 MIN: CPT | Performed by: FAMILY MEDICINE

## 2020-07-02 ENCOUNTER — CLINICAL SUPPORT (OUTPATIENT)
Dept: RADIATION ONCOLOGY | Facility: CLINIC | Age: 65
End: 2020-07-02
Attending: RADIOLOGY
Payer: COMMERCIAL

## 2020-07-02 VITALS — BODY MASS INDEX: 23.04 KG/M2 | HEIGHT: 63 IN | WEIGHT: 130 LBS

## 2020-07-02 DIAGNOSIS — Z17.0 MALIGNANT NEOPLASM OF OVERLAPPING SITES OF LEFT BREAST IN FEMALE, ESTROGEN RECEPTOR POSITIVE (HCC): Primary | ICD-10-CM

## 2020-07-02 DIAGNOSIS — C50.812 MALIGNANT NEOPLASM OF OVERLAPPING SITES OF LEFT BREAST IN FEMALE, ESTROGEN RECEPTOR POSITIVE (HCC): Primary | ICD-10-CM

## 2020-07-02 DIAGNOSIS — Z85.3 HISTORY OF CANCER OF LEFT BREAST: ICD-10-CM

## 2020-07-02 PROCEDURE — 99211 OFF/OP EST MAY X REQ PHY/QHP: CPT | Performed by: RADIOLOGY

## 2020-07-02 NOTE — PROGRESS NOTES
Virtual Brief Visit    Problem List Items Addressed This Visit     None                Reason for visit is radiation oncology follow-up    Encounter provider Madelyn Reed MD    Provider located at [id number] [facility] [phone] [id number]      Recent Visits  Date Type Provider Dept   06/25/20 Telemedicine Rehab DO Elizabeth Christopher Yazmin   Showing recent visits within past 7 days and meeting all other requirements     Today's Visits  Date Type Provider Dept   07/02/20 Telemedicine Madelyn Reed MD [facility]   Showing today's visits and meeting all other requirements     Future Appointments  Date Type Provider Dept   07/02/20 Telemedicine Madelyn Reed MD [facility]   Showing future appointments within next 150 days and meeting all other requirements        After connecting through telephone, the patient was identified by name and date of birth  Beatrice Sanches was informed that this is a telemedicine visit and that the visit is being conducted through telephone  My office door was closed  No one else was in the room  She acknowledged consent and understanding of privacy and security of the video platform  The patient has agreed to participate and understands they can discontinue the visit at any time  Patient is aware this is a billable service  Subjective  Beatrice Sanches is a 59 y o  female with a history of stage IIIA (pathologic prognostic stage group; IB) grade 1 invasive lobular carcinoma left breast status post nipple sparing mastectomy and sentinel lymph node biopsy with axillary lymph node dissection  She declined postoperative adjuvant chemotherapy  She completed radiation to the L breast 3/8/19  She was last seen by radiation oncology 10/17/19  She returns today for follow-up  Right mammogram 9/11/19, showed no evidence of malignancy    10/24/19 bone scan- 1  Normal rib uptake    No scintigraphic evidence of osseous metastasis  4/30/20 Dr Juan M Chou spoke to patient over the phone  She was due to f/u in office in December but failed to do so  Does not want to come into office due to Cara  She has concerns regarding pain in her right nipple as well as pain in the left reconstruction and left axilla  She is not feeling any mass lesions  Reassured her that pain is most likely postsurgical and inflammatory in nature however I cannot be sure without performing physical exam  She refuses to come in to office in the near future  She agreed to come at the end of may  Mammogram due 9/2020 6/4/20 Dr Juan M Chou- She has some concerns re pain in her left anterior rib cage  She reports this pain began prior to her RT  No evidence for lymphadenopathy  No evidence for lymphedema  There is some tenderness to palpation in the left axilla  No clinical evidence of recurrence at the mastectomy site  Physical exam of the right breast reveals no dominant mass, nipple discharge, tenderness, or skin changes  Reassured her the pain is most likely postsurgical and inflammatory in nature  Right sided mammogram due 9/2020  F/u in 3 months  6/16/20 US abdomen- No sonographic abnormality identified  Survey images of the left upper quadrant at the area of palpable lump demonstrate no hernia  6/25/20 Dr Deric Murray pcp- lung nodule, repeat CT chest in 3-6 months which was October 2019 (6 mm calcified granuloma in LLL) patient prefers to have repeat CT chest later in the year due to COVID concerns  Nausea since last radiation 3/2019 on and off with abdominal pain, she does not want any prescribed medications or further interventions (EGD) She wants to do everything naturally (changing diet, not laying down after eating, not eating within 3 hours of bedtime) She will discuss with rad onc        Past Medical History:   Diagnosis Date    Breast cancer ([facility] [address] )     DJD (degenerative joint disease)     History of blood clots     Osteoarthritis left hip    Osteoarthritis     Pain        Past Surgical History:   Procedure Laterality Date    BREAST IMPLANT Left 06/25/2019    Left breast tissue expander removal  open capsulectomy,gel implant insertion  Dr ETHAN Bullard Re MASTOPEXY Right 06/25/2019    Right mastopexy for symmetry    OVARY SURGERY      TOTAL HIP ARTHROPLASTY      US GUIDANCE BREAST BIOPSY LEFT EACH ADDITIONAL Left 9/19/2018    US GUIDANCE BREAST BIOPSY LEFT EACH ADDITIONAL Left 9/19/2018    US GUIDED BREAST BIOPSY LEFT COMPLETE Left 9/19/2018       Current Outpatient Medications   Medication Sig Dispense Refill    ALPRAZolam (XANAX) 1 mg tablet Take 1 tablet (1 mg total) by mouth 2 (two) times a day 60 tablet 0    aspirin 325 mg tablet Take 325 mg by mouth as needed for mild pain      Calcium-Phosphorus-Vitamin D (CALCIUM GUMMIES PO) Take 1 capsule by mouth 3 (three) times a day      cephalexin (KEFLEX) 500 mg capsule TAKE 4 CAPSULES BY MOUTH 1 HOUR BEFORE DENTAL APPOINTMENT  4    glycerin adult 2 g suppository Insert 1 suppository into the rectum as needed for constipation      HYDROmorphone (DILAUDID) 4 mg tablet take 1 tablet (4MG)  by oral route  every 4 - 6 hours as needed      lidocaine (XYLOCAINE) 5 % ointment Apply topically as needed for mild pain 35 44 g 0    Morphine Sulfate ER 30 MG TBEA Take 1 tablet by mouth 3 (three) times a day      multivitamin (THERAGRAN) TABS Take 1 tablet by mouth daily      Cholecalciferol (VITAMIN D3 PO) Take 1 capsule by mouth daily       No current facility-administered medications for this visit           Allergies   Allergen Reactions    Triamcinolone Rash and Blisters    Acetaminophen      Other reaction(s): temp paralysis, red flushing ski    Clindamycin GI Intolerance    Corticosteroids      Oral and IV    Ibuprofen     Lyrica [Pregabalin]     Penicillins Other (See Comments)    Prochlorperazine Other (See Comments)    Neurontin [Gabapentin] Rash         I spent 30 minutes with the patient during this visit  Follow up visit          Malignant neoplasm of left breast in female, estrogen receptor positive ([facility] [address] )    9/19/2018 Initial Diagnosis     Malignant neoplasm of left breast in female, estrogen receptor positive ([facility] [address] )      9/19/2018 Biopsy     Left breast biopsy x3:  Invasive lobular carcinoma, grade 1/3  ER >90%  [place] 90%  HER2 negative      10/9/2018 Surgery     Left sided nipple sparing total mastectomy  Left sided sentinel node mapping and deep biopsy with completion total axillary lymph node dissection  A   Left breast, mastectomy:  - Infiltrating lobular carcinoma, classic type, grade 1/3  - Tumor size:  Estimated to be greater than 5 cm in greatest dimension  - Tumor is seen at or within 1 mm of soft tissue margins, particularly upper outer and lower outer margins   - Extensive lobular carcinoma in situ is also seen  - Lymphatic/vascular invasion is seen  - See synoptic report for further details     B  Left axillary lymph nodes:  - Nine lymph nodes identified showing no metastatic tumor      A  [place] lymph node #1:  - Metastatic carcinoma identified in 2 of 4 lymph nodes with metastatic foci greater 2 mm and without extracapsular extension     B  [place] lymph node #2:  - One lymph node identified showing metastatic carcinoma the metastasis being greater than 2 mm and without extracapsular extension    C   Retroareolar tissue:    - Benign breast tissue showing no atypia or malignancy    - Dr Carlyn Clements  - Dr Jazmin Sierra      10/9/2018 -  Cancer Staged     Stage IIIA - pT3, pN1a, cM0, G1         12/2018 - 1/23/2019 Hormone Therapy     Letrozole 2 5 mg daily   Discontinued due to adverse effects (reported tongue swelling, throat pain upset stomach, and muscle and joint pain)  - Dr Maryam De La Garza      1/28/2019 - 3/8/2019 Radiation     Plan ID Energy Fractions Dose per Fraction (cGy) Dose Correction (cGy) Total Dose Delivered (cGy) Elapsed Days   BH L CW 6X 13 / 13 200 0 2,600 32   BH L CW Lm 6X 12 / 12 200 0 2,400 30   BH L CW Boost 6E 5 / 5 200 0 1,000 4   BH L PAB 10X 25 / 25 33 0 825 32   BH L Sclav 10X 25 / 25 200 0 5,000 28      - Dr Venessa Mccurdy      6/25/2019 Surgery     Left breast tissue expander removal  open capsulectomy,gel implant insertion  Right mastopexy for symmetry         Clinical Trial: no      Health Maintenance   Topic Date Due    Hepatitis C Screening  1955    CRC Screening: Colonoscopy  1955    HIV Screening  09/04/1970    Cervical Cancer Screening  09/04/1976    Annual Physical  09/05/2019    Influenza Vaccine  07/01/2020    Pneumococcal Vaccine: 65+ Years (1 of 2 - PCV13) 09/04/2020    MAMMOGRAM  09/11/2020    Depression Screening PHQ  05/28/2021    BMI: Adult  07/02/2021    DTaP,Tdap,and Td Vaccines (2 - Td) 10/28/2023    Pneumococcal Vaccine: Pediatrics (0 to 5 Years) and At-Risk Patients (6 to 59 Years)  Aged Out    HIB Vaccine  Aged Out    Hepatitis B Vaccine  Aged Out    IPV Vaccine  Aged Out    Hepatitis A Vaccine  Aged Out    Meningococcal ACWY Vaccine  Aged Out    HPV Vaccine  Aged Out       Patient Active Problem List   Diagnosis    History of total hip arthroplasty    Chronic deep vein thrombosis (DVT) of distal vein of right lower extremity ([address] )    Malignant neoplasm of left breast in female, estrogen receptor positive ([address] )    Osteopenia of spine    Other idiopathic scoliosis, thoracolumbar region    History of cancer of left breast    Lung nodule    Nausea     Past Medical History:   Diagnosis Date    Breast cancer ([address] )     DJD (degenerative joint disease)     History of blood clots     Osteoarthritis     left hip    Osteoarthritis     Pain      Past Surgical History:   Procedure Laterality Date    BREAST IMPLANT Left 06/25/2019    Left breast tissue expander removal  open capsulectomy,gel implant insertion   Dr ETHAN Rivera Right 06/25/2019    Right mastopexy for symmetry    OVARY SURGERY      TOTAL HIP ARTHROPLASTY      US GUIDANCE BREAST BIOPSY LEFT EACH ADDITIONAL Left 9/19/2018    US GUIDANCE BREAST BIOPSY LEFT EACH ADDITIONAL Left 9/19/2018    US GUIDED BREAST BIOPSY LEFT COMPLETE Left 9/19/2018     Family History   Problem Relation Age of Onset    Breast cancer Mother 43    Lymphoma Mother     Breast cancer Maternal Aunt      Social History     Socioeconomic History    Marital status:       Spouse name: Not on file    Number of children: Not on file    Years of education: Not on file    Highest education level: Not on file   Occupational History    Not on file   Social Needs    Financial resource strain: Not on file    Food insecurity:     Worry: Not on file     Inability: Not on file    Transportation needs:     Medical: Not on file     Non-medical: Not on file   Tobacco Use    Smoking status: Never Smoker    Smokeless tobacco: Never Used   Substance and Sexual Activity    Alcohol use: No    Drug use: No    Sexual activity: Not on file   Lifestyle    Physical activity:     Days per week: Not on file     Minutes per session: Not on file    Stress: Not on file   Relationships    Social connections:     Talks on phone: Not on file     Gets together: Not on file     Attends Zoroastrian service: Not on file     Active member of club or organization: Not on file     Attends meetings of clubs or organizations: Not on file     Relationship status: Not on file    Intimate partner violence:     Fear of current or ex partner: Not on file     Emotionally abused: Not on file     Physically abused: Not on file     Forced sexual activity: Not on file   Other Topics Concern    Not on file   Social History Narrative    Not on file       Current Outpatient Medications:     ALPRAZolam (XANAX) 1 mg tablet, Take 1 tablet (1 mg total) by mouth 2 (two) times a day, Disp: 60 tablet, Rfl: 0    aspirin 325 mg tablet, Take 325 mg by mouth as needed for mild pain, Disp: , Rfl:    Calcium-Phosphorus-Vitamin D (CALCIUM GUMMIES PO), Take 1 capsule by mouth 3 (three) times a day, Disp: , Rfl:     cephalexin (KEFLEX) 500 mg capsule, TAKE 4 CAPSULES BY MOUTH 1 HOUR BEFORE DENTAL APPOINTMENT, Disp: , Rfl: 4    glycerin adult 2 g suppository, Insert 1 suppository into the rectum as needed for constipation, Disp: , Rfl:     HYDROmorphone (DILAUDID) 4 mg tablet, take 1 tablet (4MG)  by oral route  every 4 - 6 hours as needed, Disp: , Rfl:     lidocaine (XYLOCAINE) 5 % ointment, Apply topically as needed for mild pain, Disp: 35 44 g, Rfl: 0    Morphine Sulfate ER 30 MG TBEA, Take 1 tablet by mouth 3 (three) times a day, Disp: , Rfl:     multivitamin (THERAGRAN) TABS, Take 1 tablet by mouth daily, Disp: , Rfl:     Cholecalciferol (VITAMIN D3 PO), Take 1 capsule by mouth daily, Disp: , Rfl:   Allergies   Allergen Reactions    Triamcinolone Rash and Blisters    Acetaminophen      Other reaction(s): temp paralysis, red flushing ski    Clindamycin GI Intolerance    Corticosteroids      Oral and IV    Ibuprofen     Lyrica [Pregabalin]     Penicillins Other (See Comments)    Prochlorperazine Other (See Comments)    Neurontin [Gabapentin] Rash       Review of Systems:  Review of Systems   Constitutional: Positive for fatigue (varies)  HENT: Negative  Eyes: Positive for visual disturbance (wears glasses, cataracts)  Respiratory: Negative  Cardiovascular: Positive for chest pain (sharp pains across chest since breast surgeries)  Gastrointestinal: Positive for abdominal pain (intermittent) and nausea (intermittent)  Endocrine: Negative  Genitourinary: Negative  Musculoskeletal: Positive for arthralgias and back pain (lower back)  Bilateral hip pain   Skin: Negative  Reports blistering and redness across back and collarbone that comes and goes since having radiation   Allergic/Immunologic: Negative  Neurological: Positive for numbness (neuropathy in feet)  Hematological: Negative  Psychiatric/Behavioral: Positive for sleep disturbance  The patient is nervous/anxious  Anxious       Vitals:    07/02/20 1150   Weight: 59 kg (130 lb)   Height: 5' 3" (1 6 m)        Pain Score:   4      Imaging:Us Abdomen Complete    Result Date: 6/17/2020  Narrative: ABDOMEN ULTRASOUND, COMPLETE INDICATION:   K43 9: Ventral hernia without obstruction or gangrene  COMPARISON: None TECHNIQUE:   Real-time ultrasound of the abdomen was performed with a curvilinear transducer with both volumetric sweeps and still imaging techniques  FINDINGS: PANCREAS:  Visualized portions of the pancreas are within normal limits  AORTA AND IVC:  Visualized portions are normal for patient age  LIVER: Size:  Within normal range  The liver measures cm in the midclavicular line  Contour:  Surface contour is smooth  Parenchyma:  Echogenicity and echotexture are within normal limits  No evidence of suspicious mass  Limited imaging of the main portal vein shows it to be patent and hepatopetal  BILIARY: The gallbladder is normal in caliber  No wall thickening or pericholecystic fluid  No stones or sludge identified  No sonographic Echeverria's sign  No intrahepatic biliary dilatation  CBD measures 3 mm  No choledocholithiasis  KIDNEY: Right kidney measures 10 7 x 3 6 cm  Within normal limits  Left kidney measures 11 3 x 5 6 cm  Within normal limits  SPLEEN: Measures 9 6 x 1 7 x 3 5 cm  Within normal limits  ASCITES:  None  Survey images of the left upper quadrant at the area of palpable lump demonstrate no hernia  Impression: No sonographic abnormality identified  Survey images of the left upper quadrant at the area of palpable lump demonstrate no hernia   Workstation performed: [id number]

## 2020-07-02 NOTE — PROGRESS NOTES
Follow-up - Radiation Oncology   Hu Love 1955 59 y o  female [id number]      History of Present Illness   Cancer Staging  Malignant neoplasm of left breast in female, estrogen receptor positive ([facility] [address] )  Staging form: Breast, AJCC 8th Edition  - Clinical stage from 1/3/2019: Stage IIA (cT3, cN1, cM0, G1, ER: Positive, SD: Positive, HER2: Negative) - Signed by Garfield Mooney MD on 1/5/2019  Laterality: Left  Histologic grading system: 3 grade system  - Pathologic stage from 1/3/2019: Stage IB (pT3, pN1a, cM0, G1, ER: Positive, SD: Positive, HER2: Negative) - Signed by Garfield Mooney MD on 1/5/2019  Neoadjuvant therapy: No  Laterality: Left  Histologic grading system: 3 grade system  Stage used in treatment planning: Yes  National guidelines used in treatment planning: Yes      Hu Love is a 59y o  year old female with a history of left breast carcinoma  Reason for visit is radiation oncology follow-up     Encounter provider Garfield Mooney MD     Provider located at [address] [place] [address]        Recent Visits  Date Type Provider Dept   06/25/20 Telemedicine Rehab DO Elizabeth Wetzel [facility]   Showing recent visits within past 7 days and meeting all other requirements      Today's Visits  Date Type Provider Dept   07/02/20 Telemedicine MD Neil Irizarry Rad Onc   Showing today's visits and meeting all other requirements      Future Appointments  Date Type Provider Dept   07/02/20 Telemedicine MD Neil Irizarry Rad Onc   Showing future appointments within next 150 days and meeting all other requirements         After connecting through telephone, the patient was identified by name and date of birth  Hu Love was informed that this is a telemedicine visit and that the visit is being conducted through telephone  My office door was closed  No one else was in the room    She acknowledged consent and understanding of privacy and security of the video platform  The patient has agreed to participate and understands they can discontinue the visit at any time  It was my intent to perform this visit via video technology but the patient was not able to do a video connection so the visit was completed via audio telephone only      Patient is aware this is a billable service       Subjective  Mary Troy is a 59 y o  female with a history of stage IIIA (pathologic prognostic stage group; IB) grade 1 invasive lobular carcinoma left breast status post nipple sparing mastectomy and sentinel lymph node biopsy with axillary lymph node dissection  She declined postoperative adjuvant chemotherapy  She completed radiation to the L breast 3/8/19  She was last seen by radiation oncology 10/17/19  She returns today for follow-up      Right mammogram 9/11/19, showed no evidence of malignancy    Interval History:  10/24/19 bone scan- 1   Normal rib uptake   No scintigraphic evidence of osseous metastasis      4/30/20 Dr Haseeb Pacheco spoke to patient over the phone  She was due to f/u in office in December but failed to do so  Does not want to come into office due to Cara  She has concerns regarding pain in her right nipple as well as pain in the left reconstruction and left axilla  She is not feeling any mass lesions  Reassured her that pain is most likely postsurgical and inflammatory in nature however I cannot be sure without performing physical exam  She refuses to come in to office in the near future  She agreed to come at the end of may  Mammogram due 9/2020 6/4/20 Dr Haseeb Pacheco- She has some concerns re pain in her left anterior rib cage  She reports this pain began prior to her RT  No evidence for lymphadenopathy  No evidence for lymphedema  There is some tenderness to palpation in the left axilla  No clinical evidence of recurrence at the mastectomy site   Physical exam of the right breast reveals no dominant mass, nipple discharge, tenderness, or skin changes  Reassured her the pain is most likely postsurgical and inflammatory in nature  Right sided mammogram due 9/2020  F/u in 3 months      6/16/20 US abdomen- No sonographic abnormality identified  Survey images of the left upper quadrant at the area of palpable lump demonstrate no hernia      6/25/20 Dr Eduardo Do pcp- lung nodule, repeat CT chest in 3-6 months which was October 2019 (6 mm calcified granuloma in LLL) patient prefers to have repeat CT chest later in the year due to COVID concerns  Nausea since last radiation 3/2019 on and off with abdominal pain, she does not want any prescribed medications or further interventions (EGD) She wants to do everything naturally (changing diet, not laying down after eating, not eating within 3 hours of bedtime) She will discuss with rad onc  Today, she reports continued left reconstructed breast and left rib pain below the breast, she feels like there's a band around her left upper arm   She continues with breast massage and overall there is less left breast pain and discomfort  She does report her left breast implant is heavy at times on the chest wall and rib area, but overall she feels there is less inflammation  There is minimal erythema/post treatment changes  She occasionally has a rash in the left collar bone/supraclavicular region and upper back that comes and goes  There is no lymphedema of the left upper extremity  She has been performing pool exercises and stretching exercises daily   She reports a history of chronic pain for many years even prior to radiation therapy and "anxiety exacerbates the pain   She continues to have anxiety over the death of her mother 1 5 years ago in [place]   She is trying to settle her estate and not getting along with her sister     She states she went for chest CT on 10/14/19 for follow up on lung nodule, but has not had a repeat chest CT due to the coronavirus outbreak  She wants to delay the follow-up chest CT to later this summer or the fall  Historical Information      Malignant neoplasm of left breast in female, estrogen receptor positive ([facility] [address] )    9/19/2018 Initial Diagnosis     Malignant neoplasm of left breast in female, estrogen receptor positive ([facility] [address] )      9/19/2018 Biopsy     Left breast biopsy x3:  Invasive lobular carcinoma, grade 1/3  ER >90%  NE 90%  HER2 negative      10/9/2018 Surgery     Left sided nipple sparing total mastectomy  Left sided sentinel node mapping and deep biopsy with completion total axillary lymph node dissection  A   Left breast, mastectomy:  - Infiltrating lobular carcinoma, classic type, grade 1/3  - Tumor size:  Estimated to be greater than 5 cm in greatest dimension  - Tumor is seen at or within 1 mm of soft tissue margins, particularly upper outer and lower outer margins   - Extensive lobular carcinoma in situ is also seen  - Lymphatic/vascular invasion is seen  - See synoptic report for further details     B  Left axillary lymph nodes:  - Nine lymph nodes identified showing no metastatic tumor      A  [place] lymph node #1:  - Metastatic carcinoma identified in 2 of 4 lymph nodes with metastatic foci greater 2 mm and without extracapsular extension     B  [place] lymph node #2:  - One lymph node identified showing metastatic carcinoma the metastasis being greater than 2 mm and without extracapsular extension    C   Retroareolar tissue:    - Benign breast tissue showing no atypia or malignancy    - Dr Mays Fraction  - Dr Gabriele Connors      10/9/2018 -  Cancer Staged     Stage IIIA - pT3, pN1a, cM0, G1         12/2018 - 1/23/2019 Hormone Therapy     Letrozole 2 5 mg daily   Discontinued due to adverse effects (reported tongue swelling, throat pain upset stomach, and muscle and joint pain)  - Dr Emigdio Montes      1/28/2019 - 3/8/2019 Radiation     Plan ID Energy Fractions Dose per Fraction (cGy) Dose Correction (cGy) Total Dose Delivered (cGy) Elapsed Days   BH L CW 6X 13 / 13 200 0 2,600 32   BH L CW Lm 6X 12 / 12 200 0 2,400 30   BH L CW Boost 6E 5 / 5 200 0 1,000 4   BH L PAB 10X 25 / 25 33 0 825 32   BH L Sclav 10X 25 / 25 200 0 5,000 28      - Dr Shanon Yousif      6/25/2019 Surgery     Left breast tissue expander removal  open capsulectomy,gel implant insertion  Right mastopexy for symmetry         Past Medical History:   Diagnosis Date    Breast cancer ([address] )     DJD (degenerative joint disease)     History of blood clots     Osteoarthritis     left hip    Osteoarthritis     Pain      Past Surgical History:   Procedure Laterality Date    BREAST IMPLANT Left 06/25/2019    Left breast tissue expander removal  open capsulectomy,gel implant insertion   Dr ETHAN [facility] MASTOPEXY Right 06/25/2019    Right mastopexy for symmetry    OVARY SURGERY      TOTAL HIP ARTHROPLASTY      US GUIDANCE BREAST BIOPSY LEFT EACH ADDITIONAL Left 9/19/2018    US GUIDANCE BREAST BIOPSY LEFT EACH ADDITIONAL Left 9/19/2018    US GUIDED BREAST BIOPSY LEFT COMPLETE Left 9/19/2018       Social History   Social History     Substance and Sexual Activity   Alcohol Use No     Social History     Substance and Sexual Activity   Drug Use No     Social History     Tobacco Use   Smoking Status Never Smoker   Smokeless Tobacco Never Used         Meds/Allergies     Current Outpatient Medications:     ALPRAZolam (XANAX) 1 mg tablet, Take 1 tablet (1 mg total) by mouth 2 (two) times a day, Disp: 60 tablet, Rfl: 0    aspirin 325 mg tablet, Take 325 mg by mouth as needed for mild pain, Disp: , Rfl:     Calcium-Phosphorus-Vitamin D (CALCIUM GUMMIES PO), Take 1 capsule by mouth 3 (three) times a day, Disp: , Rfl:     cephalexin (KEFLEX) 500 mg capsule, TAKE 4 CAPSULES BY MOUTH 1 HOUR BEFORE DENTAL APPOINTMENT, Disp: , Rfl: 4    glycerin adult 2 g suppository, Insert 1 suppository into the rectum as needed for constipation, Disp: , Rfl:    HYDROmorphone (DILAUDID) 4 mg tablet, take 1 tablet (4MG)  by oral route  every 4 - 6 hours as needed, Disp: , Rfl:     lidocaine (XYLOCAINE) 5 % ointment, Apply topically as needed for mild pain, Disp: 35 44 g, Rfl: 0    Morphine Sulfate ER 30 MG TBEA, Take 1 tablet by mouth 3 (three) times a day, Disp: , Rfl:     multivitamin (THERAGRAN) TABS, Take 1 tablet by mouth daily, Disp: , Rfl:     Cholecalciferol (VITAMIN D3 PO), Take 1 capsule by mouth daily, Disp: , Rfl:   Allergies   Allergen Reactions    Triamcinolone Rash and Blisters    Acetaminophen      Other reaction(s): temp paralysis, red flushing ski    Clindamycin GI Intolerance    Corticosteroids      Oral and IV    Ibuprofen     Lyrica [Pregabalin]     Penicillins Other (See Comments)    Prochlorperazine Other (See Comments)    Neurontin [Gabapentin] Rash     Review of Systems   Constitutional: Positive for fatigue (varies)  HENT: Negative  Eyes: Positive for visual disturbance (wears glasses, cataracts)  Respiratory: Negative  Cardiovascular: Positive for chest pain (sharp pains across chest since breast surgeries)  Gastrointestinal: Positive for abdominal pain (intermittent) and nausea (intermittent)  Endocrine: Negative  Genitourinary: Negative  Musculoskeletal: Positive for arthralgias and back pain (lower back)  Bilateral hip pain   Skin: Negative  Reports blistering and redness across back and collarbone that comes and goes since having radiation   Allergic/Immunologic: Negative  Neurological: Positive for numbness (neuropathy in feet)  Hematological: Negative  Psychiatric/Behavioral: Positive for sleep disturbance  The patient is nervous/anxious           Anxious       OBJECTIVE:   Ht 5' 3" (1 6 m)   Wt 59 kg (130 lb)   BMI 23 03 kg/m²   Pain Assessment:  4  ECOG/Zubrod/WHO: 1 - Symptomatic but completely ambulatory    Physical Exam   Telemedicine phone visit, so no physical examination  RESULTS    Lab Results: No results found for this or any previous visit (from the past 672 hour(s))  Imaging Studies:Us Abdomen Complete    Result Date: 6/17/2020  Narrative: ABDOMEN ULTRASOUND, COMPLETE INDICATION:   K43 9: Ventral hernia without obstruction or gangrene  COMPARISON: None TECHNIQUE:   Real-time ultrasound of the abdomen was performed with a curvilinear transducer with both volumetric sweeps and still imaging techniques  FINDINGS: PANCREAS:  Visualized portions of the pancreas are within normal limits  AORTA AND IVC:  Visualized portions are normal for patient age  LIVER: Size:  Within normal range  The liver measures cm in the midclavicular line  Contour:  Surface contour is smooth  Parenchyma:  Echogenicity and echotexture are within normal limits  No evidence of suspicious mass  Limited imaging of the main portal vein shows it to be patent and hepatopetal  BILIARY: The gallbladder is normal in caliber  No wall thickening or pericholecystic fluid  No stones or sludge identified  No sonographic Echeverria's sign  No intrahepatic biliary dilatation  CBD measures 3 mm  No choledocholithiasis  KIDNEY: Right kidney measures 10 7 x 3 6 cm  Within normal limits  Left kidney measures 11 3 x 5 6 cm  Within normal limits  SPLEEN: Measures 9 6 x 1 7 x 3 5 cm  Within normal limits  ASCITES:  None  Survey images of the left upper quadrant at the area of palpable lump demonstrate no hernia  Impression: No sonographic abnormality identified  Survey images of the left upper quadrant at the area of palpable lump demonstrate no hernia  Workstation performed: [id number]       Assessment/Plan:  No orders of the defined types were placed in this encounter         Yamileth Tobar is a 59y o  year old female with a history of an anatomic stage IIIA (pathologic prognostic stage group; IB) grade 1 invasive lobular carcinoma left breast status post nipple sparing mastectomy and sentinel lymph node biopsy with axillary lymph node dissection  Eli Hawkins was a close margin in the upper outer and lower outer soft tissue   Due to initial size of her tumor being estimated at greater than 5 cm along with tumor being seen within 1 mm of the soft tissue margins as well as 3 lymph nodes out of 14 lymph nodes being positive for metastatic disease with lymphatic and vascular invasion, she is at increased risk for locally recurrent disease  We recommended postmastectomy radiation therapy to the reconstructed left chest wall, axillary, and supraclavicular regions   She was seen by Dr Nakia Carter and declined postoperative adjuvant chemotherapy due to the potential side effects   She did consent to adjuvant hormonal therapy and was given prescription for letrozole 2 5 mg daily   She discontinued letrozole the end of January 2019 due to adverse effects   She completed radiation therapy on March 8, 2019       She returns today for follow up post radiation therapy  She continues to have some left reconstructed breast as well as underlying left chest wall/rib discomfort that extends into the left upper arm at times  She has no lymphedema  She will continue to apply moisturizer and massage the reconstructed left chest wall on a daily basis   She had removal of her tissue expander with placement of permanent gel implant in June 2019 along with right breast mastopexy for symmetry  She was seen by medical oncology April 3, 2019 and declined trying any other aromatase inhibitors    She continues to decline any aromatase inhibitors  Her chest CT from October 14, 2019 revealed a 6 mm calcified granuloma in the left lower lobe accounting for the finding noted on her recent chest x-ray  There was evidence of left mastectomy with axillary lymph node dissection and placement of implant as well as some left apical scarring  Her PET-CT study from October 24, 2018 also revealed a left lower lobe calcified granuloma    Results were discussed previously and today with the patient  Due to the coronavirus outbreak, she does not wish to have a repeat chest CT now but may do so later this summer or in the fall  She will return here for follow-up in 6 months        Gladys Hyatt MD  7/2/2020,12:25 PM    Portions of the record may have been created with voice recognition software   Occasional wrong word or "sound a like" substitutions may have occurred due to the inherent limitations of voice recognition software   Read the chart carefully and recognize, using context, where substitutions have occurred

## 2020-07-06 RX ORDER — ALPRAZOLAM 1 MG/1
1 TABLET ORAL 2 TIMES DAILY
Qty: 60 TABLET | Refills: 0 | Status: SHIPPED | OUTPATIENT
Start: 2020-07-06 | End: 2020-08-06

## 2020-08-06 DIAGNOSIS — Z85.3 HISTORY OF CANCER OF LEFT BREAST: ICD-10-CM

## 2020-08-06 RX ORDER — ALPRAZOLAM 1 MG/1
TABLET ORAL
Qty: 60 TABLET | Refills: 0 | Status: SHIPPED | OUTPATIENT
Start: 2020-08-06 | End: 2020-08-31 | Stop reason: SDUPTHER

## 2020-08-31 DIAGNOSIS — Z85.3 HISTORY OF CANCER OF LEFT BREAST: ICD-10-CM

## 2020-08-31 DIAGNOSIS — Z96.643 HISTORY OF TOTAL REPLACEMENT OF BOTH HIP JOINTS: Primary | ICD-10-CM

## 2020-09-04 RX ORDER — ALPRAZOLAM 1 MG/1
1 TABLET ORAL 2 TIMES DAILY
Qty: 60 TABLET | Refills: 0 | Status: SHIPPED | OUTPATIENT
Start: 2020-09-04 | End: 2020-10-06 | Stop reason: SDUPTHER

## 2020-09-04 RX ORDER — CEPHALEXIN 500 MG/1
CAPSULE ORAL
Qty: 4 CAPSULE | Refills: 1 | Status: SHIPPED | OUTPATIENT
Start: 2020-09-08 | End: 2020-09-09

## 2020-10-06 ENCOUNTER — TELEPHONE (OUTPATIENT)
Dept: FAMILY MEDICINE CLINIC | Facility: CLINIC | Age: 65
End: 2020-10-06

## 2020-10-06 DIAGNOSIS — Z85.3 HISTORY OF CANCER OF LEFT BREAST: ICD-10-CM

## 2020-10-06 RX ORDER — ALPRAZOLAM 1 MG/1
1 TABLET ORAL 2 TIMES DAILY
Qty: 60 TABLET | Refills: 0 | Status: SHIPPED | OUTPATIENT
Start: 2020-10-06 | End: 2020-11-03 | Stop reason: SDUPTHER

## 2020-11-03 DIAGNOSIS — Z85.3 HISTORY OF CANCER OF LEFT BREAST: ICD-10-CM

## 2020-11-06 ENCOUNTER — TELEPHONE (OUTPATIENT)
Dept: FAMILY MEDICINE CLINIC | Facility: CLINIC | Age: 65
End: 2020-11-06

## 2020-11-06 DIAGNOSIS — Z85.3 HISTORY OF CANCER OF LEFT BREAST: ICD-10-CM

## 2020-11-06 RX ORDER — ALPRAZOLAM 1 MG/1
1 TABLET ORAL 2 TIMES DAILY
Qty: 60 TABLET | Refills: 0 | Status: CANCELLED | OUTPATIENT
Start: 2020-11-06

## 2020-11-06 RX ORDER — ALPRAZOLAM 1 MG/1
1 TABLET ORAL 2 TIMES DAILY
Qty: 60 TABLET | Refills: 0 | Status: SHIPPED | OUTPATIENT
Start: 2020-11-06 | End: 2020-12-04 | Stop reason: SDUPTHER

## 2020-12-03 ENCOUNTER — TELEPHONE (OUTPATIENT)
Dept: FAMILY MEDICINE CLINIC | Facility: CLINIC | Age: 65
End: 2020-12-03

## 2020-12-04 DIAGNOSIS — M41.25 OTHER IDIOPATHIC SCOLIOSIS, THORACOLUMBAR REGION: Primary | ICD-10-CM

## 2020-12-04 DIAGNOSIS — Z85.3 HISTORY OF CANCER OF LEFT BREAST: ICD-10-CM

## 2020-12-07 RX ORDER — ALPRAZOLAM 1 MG/1
1 TABLET ORAL 2 TIMES DAILY
Qty: 60 TABLET | Refills: 0 | Status: SHIPPED | OUTPATIENT
Start: 2020-12-07 | End: 2021-01-07 | Stop reason: SDUPTHER

## 2021-01-06 ENCOUNTER — TELEPHONE (OUTPATIENT)
Dept: FAMILY MEDICINE CLINIC | Facility: CLINIC | Age: 66
End: 2021-01-06

## 2021-01-06 NOTE — TELEPHONE ENCOUNTER
SIGNATURES NEEDED FOR HUMAN TOUCH FORM RECEIVED VIA FAX  WILL BE PLACED IN YOUR BIN AT ASSIGNED DELIVERY TIMES

## 2021-01-07 DIAGNOSIS — Z85.3 HISTORY OF CANCER OF LEFT BREAST: ICD-10-CM

## 2021-01-07 RX ORDER — ALPRAZOLAM 1 MG/1
1 TABLET ORAL 2 TIMES DAILY
Qty: 60 TABLET | Refills: 0 | Status: SHIPPED | OUTPATIENT
Start: 2021-01-07 | End: 2021-02-05 | Stop reason: SDUPTHER

## 2021-01-11 NOTE — TELEPHONE ENCOUNTER
Form faxed to [phone]  Plan of care start date 12/09  Order #[id number]  Fax confirmation received

## 2021-01-12 ENCOUNTER — TELEPHONE (OUTPATIENT)
Dept: FAMILY MEDICINE CLINIC | Facility: CLINIC | Age: 66
End: 2021-01-12

## 2021-01-12 NOTE — TELEPHONE ENCOUNTER
SIGNATURES NEEDED FOR HOME HEALTH  CERTIFICATION FORM RECEIVED VIA FAX  WILL BE PLACED IN YOUR BIN AT ASSIGNED DELIVERY TIMES

## 2021-01-14 ENCOUNTER — TELEMEDICINE (OUTPATIENT)
Dept: FAMILY MEDICINE CLINIC | Facility: CLINIC | Age: 66
End: 2021-01-14

## 2021-01-14 DIAGNOSIS — Z85.3 HISTORY OF CANCER OF LEFT BREAST: ICD-10-CM

## 2021-01-14 DIAGNOSIS — M48.061 DEGENERATIVE LUMBAR SPINAL STENOSIS: ICD-10-CM

## 2021-01-14 DIAGNOSIS — F43.22 ADJUSTMENT DISORDER WITH ANXIETY: Primary | ICD-10-CM

## 2021-01-14 DIAGNOSIS — R91.1 LUNG NODULE: ICD-10-CM

## 2021-01-14 PROCEDURE — 1160F RVW MEDS BY RX/DR IN RCRD: CPT | Performed by: FAMILY MEDICINE

## 2021-01-14 PROCEDURE — 1036F TOBACCO NON-USER: CPT | Performed by: FAMILY MEDICINE

## 2021-01-14 PROCEDURE — 99214 OFFICE O/P EST MOD 30 MIN: CPT | Performed by: FAMILY MEDICINE

## 2021-01-14 NOTE — PROGRESS NOTES
Virtual Brief Visit    Assessment/Plan:    Problem List Items Addressed This Visit        Other    History of cancer of left breast     Needs to make follow-up appointment with Dr Radha Dorsey  Still taking xanax over diagnosis and now with worry over COVID-pandemic         Lung nodule     -Patient was supposed to have had repeat CT chest in 3 to 6 months from last CT which was in October 2019 (6 mm calcified granuloma in LLL)  -Patient prefers to have repeat CT later in the year due to concerns of leaving home due to COVID-19 pandemic         Adjustment disorder with anxiety - Primary     Since diagnosis of breast cancer, has been on xanax  Now worried about COVID-pandemic especially with her condition         Degenerative lumbar spinal stenosis     Last saw physiatrist in December 2020  Continue pain medications   Continue PT home exercises                     Reason for visit is   Chief Complaint   Patient presents with    Virtual Brief Visit        Encounter provider Rehab July Metz DO    Provider located at [address] [place] [id number]  [phone]    Recent Visits  Date Type Provider Dept   01/12/21 Telephone Rehab July Metz, [address] [place] [address] recent visits within past 7 days and meeting all other requirements     Today's Visits  Date Type Provider Dept   01/14/21 Telemedicine Rehab July Metz DO  [facility]   Showing today's visits and meeting all other requirements     Future Appointments  No visits were found meeting these conditions  Showing future appointments within next 150 days and meeting all other requirements        After connecting through telephone, the patient was identified by name and date of birth  Avelina Victoria was informed that this is a telemedicine visit and that the visit is being conducted through telephone  My office door was closed  No one else was in the room    She acknowledged consent and understanding of privacy and security of the platform  The patient has agreed to participate and understands she can discontinue the visit at any time  Patient is aware this is a billable service  Subjective    Delbert Arias is a 72 y o  female  HPI Has gone to PT for her back and buttocks pain  Diagnosed with piriformis syndrome  Still cannot lay in her bed  Laying down every day briefly  Sleeps in a recliner  Pain is the worst when she gets up again  Has been doing the exercises that PT has showed her at home  Does not go out much  Her aide comes three times per week to help her bathe and prepare food  Patient is very anxious because her aide has family in [place] with worrying about COVID  Has to make appointment with Dr Wen Carrion  Has seen Dr Judge Petit for her breast cancer  Past Medical History:   Diagnosis Date    Breast cancer ([address] )     DJD (degenerative joint disease)     History of blood clots     Osteoarthritis     left hip    Osteoarthritis     Pain        Past Surgical History:   Procedure Laterality Date    BREAST IMPLANT Left 06/25/2019    Left breast tissue expander removal  open capsulectomy,gel implant insertion   Dr ETHAN [facility] MASTOPEXY Right 06/25/2019    Right mastopexy for symmetry    OVARY SURGERY      TOTAL HIP ARTHROPLASTY      US GUIDANCE BREAST BIOPSY LEFT EACH ADDITIONAL Left 9/19/2018    US GUIDANCE BREAST BIOPSY LEFT EACH ADDITIONAL Left 9/19/2018    US GUIDED BREAST BIOPSY LEFT COMPLETE Left 9/19/2018       Current Outpatient Medications   Medication Sig Dispense Refill    ALPRAZolam (XANAX) 1 mg tablet Take 1 tablet (1 mg total) by mouth 2 (two) times a day 60 tablet 0    aspirin 325 mg tablet Take 325 mg by mouth as needed for mild pain      Calcium-Phosphorus-Vitamin D (CALCIUM GUMMIES PO) Take 1 capsule by mouth 3 (three) times a day      Cholecalciferol (VITAMIN D3 PO) Take 1 capsule by mouth daily      glycerin adult 2 g suppository Insert 1 suppository into the rectum as needed for constipation      HYDROmorphone (DILAUDID) 4 mg tablet take 1 tablet (4MG)  by oral route  every 4 - 6 hours as needed      lidocaine (XYLOCAINE) 5 % ointment Apply topically as needed for mild pain 35 44 g 0    Morphine Sulfate ER 30 MG TBEA Take 1 tablet by mouth 3 (three) times a day      multivitamin (THERAGRAN) TABS Take 1 tablet by mouth daily       No current facility-administered medications for this visit  Allergies   Allergen Reactions    Triamcinolone Rash and Blisters    Acetaminophen      Other reaction(s): temp paralysis, red flushing ski    Clindamycin GI Intolerance    Corticosteroids      Oral and IV    Ibuprofen     Lyrica [Pregabalin]     Penicillins Other (See Comments)    Prochlorperazine Other (See Comments)    Neurontin [Gabapentin] Rash       Review of Systems   Constitutional: Negative for chills, fatigue, fever and unexpected weight change  HENT: Negative for congestion, ear pain, rhinorrhea and sore throat  Eyes: Negative for pain and visual disturbance  Respiratory: Negative for cough, chest tightness and shortness of breath  Cardiovascular: Negative for chest pain, palpitations and leg swelling  Gastrointestinal: Negative for abdominal pain, constipation, diarrhea, nausea and vomiting  Genitourinary: Negative for difficulty urinating, dysuria and urgency  Musculoskeletal: Positive for back pain and gait problem  Negative for arthralgias  Skin: Negative for rash  Neurological: Negative for dizziness, numbness and headaches  Psychiatric/Behavioral: Negative for behavioral problems and suicidal ideas  The patient is nervous/anxious  There were no vitals filed for this visit  I spent 20 minutes directly with the patient during this visit      It was my intent to perform this visit via video technology but the patient was not able to do a video connection so the visit was completed via audio telephone only      VIRTUAL VISIT DISCLAIMER    Ashley Joséevon acknowledges that she has consented to an online visit or consultation  She understands that the online visit is based solely on information provided by her, and that, in the absence of a face-to-face physical evaluation by the physician, the diagnosis she receives is both limited and provisional in terms of accuracy and completeness  This is not intended to replace a full medical face-to-face evaluation by the physician  Ashley Nieves understands and accepts these terms

## 2021-01-15 ENCOUNTER — TELEPHONE (OUTPATIENT)
Dept: FAMILY MEDICINE CLINIC | Facility: CLINIC | Age: 66
End: 2021-01-15

## 2021-01-15 PROBLEM — F43.22 ADJUSTMENT DISORDER WITH ANXIETY: Status: ACTIVE | Noted: 2021-01-15

## 2021-01-15 PROBLEM — M48.061 DEGENERATIVE LUMBAR SPINAL STENOSIS: Status: ACTIVE | Noted: 2021-01-15

## 2021-01-15 PROBLEM — R11.0 NAUSEA: Status: RESOLVED | Noted: 2020-06-25 | Resolved: 2021-01-15

## 2021-01-15 NOTE — ASSESSMENT & PLAN NOTE
-Patient was supposed to have had repeat CT chest in 3 to 6 months from last CT which was in October 2019 (6 mm calcified granuloma in LLL)  -Patient prefers to have repeat CT later in the year due to concerns of leaving home due to COVID-19 pandemic

## 2021-01-15 NOTE — ASSESSMENT & PLAN NOTE
Since diagnosis of breast cancer, has been on xanax  Now worried about COVID-pandemic especially with her condition

## 2021-01-15 NOTE — ASSESSMENT & PLAN NOTE
Needs to make follow-up appointment with Dr Imani Juan  Still taking xanax over diagnosis and now with worry over COVID-pandemic

## 2021-01-15 NOTE — TELEPHONE ENCOUNTER
Patient had televisit with me yesterday   Please schedule her for in office visit on 3/25/21 at 11 am  Thank you, Dr Víctor Lopez

## 2021-01-21 ENCOUNTER — RADIATION ONCOLOGY FOLLOW-UP (OUTPATIENT)
Dept: RADIATION ONCOLOGY | Facility: CLINIC | Age: 66
End: 2021-01-21
Attending: RADIOLOGY
Payer: COMMERCIAL

## 2021-01-21 VITALS — BODY MASS INDEX: 23.03 KG/M2 | HEIGHT: 63 IN

## 2021-01-21 DIAGNOSIS — Z17.0 MALIGNANT NEOPLASM OF OVERLAPPING SITES OF LEFT BREAST IN FEMALE, ESTROGEN RECEPTOR POSITIVE (HCC): Primary | ICD-10-CM

## 2021-01-21 DIAGNOSIS — C50.812 MALIGNANT NEOPLASM OF OVERLAPPING SITES OF LEFT BREAST IN FEMALE, ESTROGEN RECEPTOR POSITIVE (HCC): Primary | ICD-10-CM

## 2021-01-21 PROCEDURE — 99211 OFF/OP EST MAY X REQ PHY/QHP: CPT | Performed by: RADIOLOGY

## 2021-01-21 NOTE — PROGRESS NOTES
Follow-up - Radiation Oncology   Melissa Madrid 1955 72 y o  female [id number]      History of Present Illness   Cancer Staging  Malignant neoplasm of left breast in female, estrogen receptor positive ([facility] [address] )  Staging form: Breast, AJCC 8th Edition  - Clinical stage from 1/3/2019: Stage IIA (cT3, cN1, cM0, G1, ER: Positive, AL: Positive, HER2: Negative) - Signed by Kristopher Coello MD on 1/5/2019  Histologic grading system: 3 grade system  Laterality: Left  - Pathologic stage from 1/3/2019: Stage IB (pT3, pN1a, cM0, G1, ER: Positive, AL: Positive, HER2: Negative) - Signed by Kristopher Coello MD on 1/5/2019  Neoadjuvant therapy: No  Histologic grading system: 3 grade system  Laterality: Left  Stage used in treatment planning: Yes  National guidelines used in treatment planning: Yes      Melissa Madrid is a 72y o  year old female with a history of left breast carcinoma  Reason for visit is radiation oncology follow-up     Encounter provider Kristopher Coello MD     Provider located at [address] [place] [id number]        Recent Visits  Date Type Provider Dept   01/15/21 Telephone [facility]   01/14/21 Telemedicine Rehab [facility] DO Elizabeth [facility]   Showing recent visits within past 7 days and meeting all other requirements        Future Appointments  No visits were found meeting these conditions  Showing future appointments within next 150 days and meeting all other requirements         After connecting through telephone, the patient was identified by name and date of birth  Melissa Madrid was informed that this is a telemedicine visit and that the visit is being conducted through telephone  My office door was closed  No one else was in the room  She acknowledged consent and understanding of privacy and security of the video platform   The patient has agreed to participate and understands they can discontinue the visit at any time      It was my intent to perform this visit via video technology but the patient was not able to do a video connection so the visit was completed via audio telephone only  Patient is aware this is a billable service       Subjective  Delbert Arias is a 72 y o  female with a history of an anatomic stage IIIA (pathologic prognostic stage group; IB) grade 1 invasive lobular carcinoma left breast status post nipple sparing mastectomy and sentinel lymph node biopsy with axillary lymph node dissection   There was a close margin in the upper outer and lower outer soft tissue   Due to initial size of her tumor being estimated at greater than 5 cm along with tumor being seen within 1 mm of the soft tissue margins as well as 3 lymph nodes out of 14 lymph nodes being positive for metastatic disease with lymphatic and vascular invasion, she is at increased risk for locally recurrent disease  We recommended postmastectomy radiation therapy to the reconstructed left chest wall, axillary, and supraclavicular regions   She was seen by Dr Wen Carrion and declined postoperative adjuvant chemotherapy due to the potential side effects   She did consent to adjuvant hormonal therapy and was given prescription for letrozole 2 5 mg daily   She discontinued letrozole the end of January 2019 due to adverse effects   She completed radiation therapy on March 8, 2019  She continues to have some left reconstructed breast as well as underlying left chest wall/rib discomfort that extends into the left upper arm at times  Tommas Finders has no lymphedema    She will continue to apply moisturizer and massage the reconstructed left chest wall on a daily basis   She had removal of her tissue expander with placement of permanent gel implant in June 2019 along with right breast mastopexy for symmetry   She was seen by medical oncology April 3, 2019 and declined trying any other aromatase inhibitors    She continues to decline any aromatase inhibitors  She was last seen 7/2/2020      Interval History:  10/22/2020 She had 3 D Mammo Right breast: benign     10/22/2020 Had f/u with Dr Kike Brown  Ongoing tenderness in her bilateral inframammary folds and in her left reconstruction  Pt had declined to have ct chest for surveillance, and was asked to see medical oncology for f/u given risk for recurrence  F/u in 6 months     Today, she reports less left reconstructed breast, axilla and left rib pain below the breast   She continues with breast massage and overall there is less left breast pain and discomfort   She does report her left breast implant is heavy at times on the chest wall and rib area, but overall she feels there is less inflammation  There is minimal erythema/post treatment changes  She occasionally has a rash in the left collar bone/supraclavicular region and upper back that comes and goes  There is no lymphedema of the left upper extremity  She reports a history of chronic bone and back pain for many years even prior to radiation therapy and "anxiety exacerbates the pain  She reports she has been diagnosed with Piriformis syndrome  She continues to have anxiety over the death of her mother 2 years ago in [place]   She is trying to settle her estate and not getting along with her sister   She states she went for chest CT on 10/14/19 for follow up on lung nodule, but has not had a repeat chest CT due to the coronavirus outbreak  She wants to delay the follow-up chest CT to this summer or the fall      4/28/21 she will now follow up with Dr Luz Maria Morrison   Oncology History   Malignant neoplasm of left breast in female, estrogen receptor positive ([facility] [address] )   9/19/2018 Initial Diagnosis    Malignant neoplasm of left breast in female, estrogen receptor positive ([facility] [address] )     9/19/2018 Biopsy    Left breast biopsy x3:  Invasive lobular carcinoma, grade 1/3  ER >90%  MD 90%  HER2 negative     10/9/2018 Surgery    Left sided nipple sparing total mastectomy  Left sided sentinel node mapping and deep biopsy with completion total axillary lymph node dissection  A   Left breast, mastectomy:  - Infiltrating lobular carcinoma, classic type, grade 1/3  - Tumor size:  Estimated to be greater than 5 cm in greatest dimension  - Tumor is seen at or within 1 mm of soft tissue margins, particularly upper outer and lower outer margins   - Extensive lobular carcinoma in situ is also seen  - Lymphatic/vascular invasion is seen  - See synoptic report for further details     B  Left axillary lymph nodes:  - Nine lymph nodes identified showing no metastatic tumor      A  [place] lymph node #1:  - Metastatic carcinoma identified in 2 of 4 lymph nodes with metastatic foci greater 2 mm and without extracapsular extension     B  [place] lymph node #2:  - One lymph node identified showing metastatic carcinoma the metastasis being greater than 2 mm and without extracapsular extension    C  Retroareolar tissue:    - Benign breast tissue showing no atypia or malignancy    - Dr Guzman Pocahontas  - Dr Leslye Rodríguez     10/9/2018 -  Cancer Staged    Stage IIIA - pT3, pN1a, cM0, G1        12/2018 - 1/23/2019 Hormone Therapy    Letrozole 2 5 mg daily   Discontinued due to adverse effects (reported tongue swelling, throat pain upset stomach, and muscle and joint pain)  - Dr Jorge Durant     1/28/2019 - 3/8/2019 Radiation    Plan ID Energy Fractions Dose per Fraction (cGy) Dose Correction (cGy) Total Dose Delivered (cGy) Elapsed Days   BH L CW 6X 13 / 13 200 0 2,600 32   BH L CW Lm 6X 12 / 12 200 0 2,400 30   BH L CW Boost 6E 5 / 5 200 0 1,000 4   BH L PAB 10X 25 / 25 33 0 825 32   BH L Sclav 10X 25 / 25 200 0 5,000 28      - Dr Ronit Pittman     6/25/2019 Surgery    Left breast tissue expander removal  open capsulectomy,gel implant insertion     Right mastopexy for symmetry         Past Medical History:   Diagnosis Date    Breast cancer ([address] )     LAWRENCE (degenerative joint disease)     History of blood clots     Osteoarthritis     left hip    Osteoarthritis     Pain      Past Surgical History:   Procedure Laterality Date    BREAST IMPLANT Left 06/25/2019    Left breast tissue expander removal  open capsulectomy,gel implant insertion   Dr ETHAN Putnam Mor MASTOPEXY Right 06/25/2019    Right mastopexy for symmetry    OVARY SURGERY      TOTAL HIP ARTHROPLASTY      US GUIDANCE BREAST BIOPSY LEFT EACH ADDITIONAL Left 9/19/2018    US GUIDANCE BREAST BIOPSY LEFT EACH ADDITIONAL Left 9/19/2018    US GUIDED BREAST BIOPSY LEFT COMPLETE Left 9/19/2018       Social History   Social History     Substance and Sexual Activity   Alcohol Use No     Social History     Substance and Sexual Activity   Drug Use No     Social History     Tobacco Use   Smoking Status Never Smoker   Smokeless Tobacco Never Used     Meds/Allergies     Current Outpatient Medications:     ALPRAZolam (XANAX) 1 mg tablet, Take 1 tablet (1 mg total) by mouth 2 (two) times a day, Disp: 60 tablet, Rfl: 0    aspirin 325 mg tablet, Take 325 mg by mouth as needed for mild pain, Disp: , Rfl:     Calcium-Phosphorus-Vitamin D (CALCIUM GUMMIES PO), Take 1 capsule by mouth 3 (three) times a day, Disp: , Rfl:     glycerin adult 2 g suppository, Insert 1 suppository into the rectum as needed for constipation, Disp: , Rfl:     HYDROmorphone (DILAUDID) 4 mg tablet, take 1 tablet (4MG)  by oral route  every 4 - 6 hours as needed, Disp: , Rfl:     lidocaine (XYLOCAINE) 5 % ointment, Apply topically as needed for mild pain, Disp: 35 44 g, Rfl: 0    Morphine Sulfate ER 30 MG TBEA, Take 1 tablet by mouth 3 (three) times a day, Disp: , Rfl:     multivitamin (THERAGRAN) TABS, Take 1 tablet by mouth daily, Disp: , Rfl:     Cholecalciferol (VITAMIN D3 PO), Take 1 capsule by mouth daily, Disp: , Rfl:   Allergies   Allergen Reactions    Triamcinolone Rash and Blisters    Acetaminophen      Other reaction(s): temp paralysis, red flushing ski    Clindamycin GI Intolerance    Corticosteroids      Oral and IV    Ibuprofen     Lyrica [Pregabalin]     Penicillins Other (See Comments)    Prochlorperazine Other (See Comments)    Neurontin [Gabapentin] Rash     Review of Systems   Constitutional: Positive for fatigue  HENT: Negative  Reports feeling "like her breath is hot" sometimes   Eyes: Positive for visual disturbance (cataracts)  Respiratory: Positive for shortness of breath (with exertion)  Cardiovascular: Negative  Gastrointestinal: Negative  Endocrine: Negative  Genitourinary: Negative  Musculoskeletal: Positive for arthralgias and back pain  Tenderness left axilla   Skin: Negative  Reports redness/blisters on left side of back that come and go   Allergic/Immunologic: Negative  Neurological: Positive for dizziness (at times) and numbness (neuropathy right foot, left hand)  Hematological: Negative  Psychiatric/Behavioral: Positive for sleep disturbance  The patient is nervous/anxious  OBJECTIVE:   Ht 5' 3" (1 6 m)   BMI 23 03 kg/m²   Pain Assessment:  8  ECOG/Zubrod/WHO: 1 - Symptomatic but completely ambulatory    Physical Exam   Telemedicine phone visit, so no physical examination  RESULTS    Lab Results: No results found for this or any previous visit (from the past 672 hour(s))  Imaging Studies:No results found  Assessment/Plan:  No orders of the defined types were placed in this encounter       Landon Pelayo is a 72y o  year old female with a history of an anatomic stage IIIA (pathologic prognostic stage group; IB) grade 1 invasive lobular carcinoma left breast status post nipple sparing mastectomy and sentinel lymph node biopsy with axillary lymph node dissection   There was a close margin in the upper outer and lower outer soft tissue   Due to initial size of her tumor being estimated at greater than 5 cm along with tumor being seen within 1 mm of the soft tissue margins as well as 3 lymph nodes out of 14 lymph nodes being positive for metastatic disease with lymphatic and vascular invasion, she is at increased risk for locally recurrent disease  We recommended postmastectomy radiation therapy to the reconstructed left chest wall, axillary, and supraclavicular regions   She was seen by Dr Megha Stahl and declined postoperative adjuvant chemotherapy due to the potential side effects   She did consent to adjuvant hormonal therapy and was given prescription for letrozole 2 5 mg daily   She discontinued letrozole the end of January 2019 due to adverse effects   She completed radiation therapy on March 8, 2019       She returns today for follow up post radiation therapy   She continues to have some left reconstructed breast as well as underlying left chest wall/rib discomfort that extends into the left upper arm at times which is improving   She has no lymphedema    She will continue to apply moisturizer and massage the reconstructed left chest wall on a daily basis   She had removal of her tissue expander with placement of permanent gel implant in June 2019 along with right breast mastopexy for symmetry  She was seen by medical oncology April 3, 2019 and declined trying any other aromatase inhibitors    She continues to decline any aromatase inhibitors   Her chest CT from October 14, 2019 revealed a 6 mm calcified granuloma in the left lower lobe accounting for the finding noted on her recent chest x-ray  Delfina Rich was evidence of left mastectomy with axillary lymph node dissection and placement of implant as well as some left apical scarring   Her PET-CT study from October 24, 2018 also revealed a left lower lobe calcified granuloma   Results were discussed previously and today with the patient    Due to the coronavirus outbreak, she does not wish to have a repeat chest CT now but may do so later this summer or in the fall    She will return here for follow-up in 6 months  Elma Martin MD  1/21/2021,2:50 PM    Portions of the record may have been created with voice recognition software   Occasional wrong word or "sound a like" substitutions may have occurred due to the inherent limitations of voice recognition software   Read the chart carefully and recognize, using context, where substitutions have occurred

## 2021-01-21 NOTE — PROGRESS NOTES
Virtual Brief Visit    Problem List Items Addressed This Visit     None                Reason for visit is radiation oncology follow-up    Encounter provider Mc Hanna MD    Provider located at [address] [address] [id number]      Recent Visits  Date Type Provider Dept   01/15/21 Telephone Rehab Yousif Duty, DO [facility]   01/14/21 Telemedicine Rehab Tabchi, DO [facility]   Showing recent visits within past 7 days and meeting all other requirements     Future Appointments  No visits were found meeting these conditions  Showing future appointments within next 150 days and meeting all other requirements        After connecting through telephone, the patient was identified by name and date of birth  Ariadna Valera was informed that this is a telemedicine visit and that the visit is being conducted through telephone  My office door was closed  No one else was in the room  She acknowledged consent and understanding of privacy and security of the video platform  The patient has agreed to participate and understands they can discontinue the visit at any time  Patient is aware this is a billable service  Subjective  Ariadna Valera is a 72 y o  female with a history of an anatomic stage IIIA (pathologic prognostic stage group; IB) grade 1 invasive lobular carcinoma left breast status post nipple sparing mastectomy and sentinel lymph node biopsy with axillary lymph node dissection  There was a close margin in the upper outer and lower outer soft tissue  Due to initial size of her tumor being estimated at greater than 5 cm along with tumor being seen within 1 mm of the soft tissue margins as well as 3 lymph nodes out of 14 lymph nodes being positive for metastatic disease with lymphatic and vascular invasion, she is at increased risk for locally recurrent disease   We recommended postmastectomy radiation therapy to the reconstructed left chest wall, axillary, and supraclavicular regions  She was seen by Dr Frandy Key and declined postoperative adjuvant chemotherapy due to the potential side effects  She did consent to adjuvant hormonal therapy and was given prescription for letrozole 2 5 mg daily  She discontinued letrozole the end of January 2019 due to adverse effects  She completed radiation therapy on March 8, 2019  She continues to have some left reconstructed breast as well as underlying left chest wall/rib discomfort that extends into the left upper arm at times  She has no lymphedema  She will continue to apply moisturizer and massage the reconstructed left chest wall on a daily basis  She had removal of her tissue expander with placement of permanent gel implant in June 2019 along with right breast mastopexy for symmetry  She was seen by medical oncology April 3, 2019 and declined trying any other aromatase inhibitors  She continues to decline any aromatase inhibitors  She was last seen 7/2/2020     10/22/2020 She had 3 D Mammo Right breast: benign    10/22/2020 Had f/u with Dr Treva Ly  Ongoing tenderness in her bilateral inframammary folds and in her left reconstruction  Pt had declined to have ct chest for surveillance, and was asked to see medical oncology for f/u given risk for recurrence  F/u in 6 months    4/28/21 she will now follow up with Dr Prema Santana      Past Medical History:   Diagnosis Date    Breast cancer ([address] )     DJD (degenerative joint disease)     History of blood clots     Osteoarthritis     left hip    Osteoarthritis     Pain        Past Surgical History:   Procedure Laterality Date    BREAST IMPLANT Left 06/25/2019    Left breast tissue expander removal  open capsulectomy,gel implant insertion   Dr ETHAN Larose Right 06/25/2019    Right mastopexy for symmetry    OVARY SURGERY      TOTAL HIP ARTHROPLASTY      US GUIDANCE BREAST BIOPSY LEFT EACH ADDITIONAL Left 9/19/2018  US GUIDANCE BREAST BIOPSY LEFT EACH ADDITIONAL Left 9/19/2018    US GUIDED BREAST BIOPSY LEFT COMPLETE Left 9/19/2018       Current Outpatient Medications   Medication Sig Dispense Refill    ALPRAZolam (XANAX) 1 mg tablet Take 1 tablet (1 mg total) by mouth 2 (two) times a day 60 tablet 0    aspirin 325 mg tablet Take 325 mg by mouth as needed for mild pain      Calcium-Phosphorus-Vitamin D (CALCIUM GUMMIES PO) Take 1 capsule by mouth 3 (three) times a day      glycerin adult 2 g suppository Insert 1 suppository into the rectum as needed for constipation      HYDROmorphone (DILAUDID) 4 mg tablet take 1 tablet (4MG)  by oral route  every 4 - 6 hours as needed      lidocaine (XYLOCAINE) 5 % ointment Apply topically as needed for mild pain 35 44 g 0    Morphine Sulfate ER 30 MG TBEA Take 1 tablet by mouth 3 (three) times a day      multivitamin (THERAGRAN) TABS Take 1 tablet by mouth daily      Cholecalciferol (VITAMIN D3 PO) Take 1 capsule by mouth daily       No current facility-administered medications for this visit  Allergies   Allergen Reactions    Triamcinolone Rash and Blisters    Acetaminophen      Other reaction(s): temp paralysis, red flushing ski    Clindamycin GI Intolerance    Corticosteroids      Oral and IV    Ibuprofen     Lyrica [Pregabalin]     Penicillins Other (See Comments)    Prochlorperazine Other (See Comments)    Neurontin [Gabapentin] Rash         I spent 30 minutes with the patient during this visit  Follow up visit       Oncology History   Malignant neoplasm of left breast in female, estrogen receptor positive ([facility] )   9/19/2018 Initial Diagnosis    Malignant neoplasm of left breast in female, estrogen receptor positive ([facility] )     9/19/2018 Biopsy    Left breast biopsy x3:  Invasive lobular carcinoma, grade 1/3  ER >90%  [place] 90%  HER2 negative     10/9/2018 Surgery    Left sided nipple sparing total mastectomy   Left sided sentinel node mapping and deep biopsy with completion total axillary lymph node dissection  A   Left breast, mastectomy:  - Infiltrating lobular carcinoma, classic type, grade 1/3  - Tumor size:  Estimated to be greater than 5 cm in greatest dimension  - Tumor is seen at or within 1 mm of soft tissue margins, particularly upper outer and lower outer margins   - Extensive lobular carcinoma in situ is also seen  - Lymphatic/vascular invasion is seen  - See synoptic report for further details     B  Left axillary lymph nodes:  - Nine lymph nodes identified showing no metastatic tumor      A  [place] lymph node #1:  - Metastatic carcinoma identified in 2 of 4 lymph nodes with metastatic foci greater 2 mm and without extracapsular extension     B  [place] lymph node #2:  - One lymph node identified showing metastatic carcinoma the metastasis being greater than 2 mm and without extracapsular extension    C  Retroareolar tissue:    - Benign breast tissue showing no atypia or malignancy    - Dr Ning Lopez  - Dr Lee Nunes     10/9/2018 -  Cancer Staged    Stage IIIA - pT3, pN1a, cM0, G1        12/2018 - 1/23/2019 Hormone Therapy    Letrozole 2 5 mg daily   Discontinued due to adverse effects (reported tongue swelling, throat pain upset stomach, and muscle and joint pain)  - Dr Irving العلي     1/28/2019 - 3/8/2019 Radiation    Plan ID Energy Fractions Dose per Fraction (cGy) Dose Correction (cGy) Total Dose Delivered (cGy) Elapsed Days   BH L CW 6X 13 / 13 200 0 2,600 32   BH L CW Lm 6X 12 / 12 200 0 2,400 30   BH L CW Boost 6E 5 / 5 200 0 1,000 4   BH L PAB 10X 25 / 25 33 0 825 32   BH L Sclav 10X 25 / 25 200 0 5,000 28      - Dr Kimberli Mcneil     6/25/2019 Surgery    Left breast tissue expander removal  open capsulectomy,gel implant insertion     Right mastopexy for symmetry         Clinical Trial: no      Health Maintenance   Topic Date Due    Hepatitis C Screening  1955    HIV Screening  09/04/1970    Cervical Cancer Screening  09/04/1976    Colorectal Cancer Screening  09/04/2005    Annual Physical  09/05/2019    Influenza Vaccine (1) 09/01/2020    Fall Risk  09/04/2020    MAMMOGRAM  09/11/2020    Depression Screening PHQ  07/02/2021    BMI: Adult  07/02/2021    Pneumococcal Vaccine: 65+ Years (1 of 1 - PPSV23) 10/03/2021    DTaP,Tdap,and Td Vaccines (2 - Td) 10/28/2023    HIB Vaccine  Aged Out    Hepatitis B Vaccine  Aged Out    IPV Vaccine  Aged Out    Hepatitis A Vaccine  Aged Out    Meningococcal ACWY Vaccine  Aged Out    HPV Vaccine  Aged Out       Patient Active Problem List   Diagnosis    History of total hip arthroplasty    Chronic deep vein thrombosis (DVT) of distal vein of right lower extremity (HCC)    Malignant neoplasm of left breast in female, estrogen receptor positive ([address] )    Osteopenia of spine    Other idiopathic scoliosis, thoracolumbar region    History of cancer of left breast    Lung nodule    Adjustment disorder with anxiety    Degenerative lumbar spinal stenosis     Past Medical History:   Diagnosis Date    Breast cancer ([address] )     DJD (degenerative joint disease)     History of blood clots     Osteoarthritis     left hip    Osteoarthritis     Pain      Past Surgical History:   Procedure Laterality Date    BREAST IMPLANT Left 06/25/2019    Left breast tissue expander removal  open capsulectomy,gel implant insertion  Dr ETHAN Garay MASTOPEXY Right 06/25/2019    Right mastopexy for symmetry    OVARY SURGERY      TOTAL HIP ARTHROPLASTY      US GUIDANCE BREAST BIOPSY LEFT EACH ADDITIONAL Left 9/19/2018    US GUIDANCE BREAST BIOPSY LEFT EACH ADDITIONAL Left 9/19/2018    US GUIDED BREAST BIOPSY LEFT COMPLETE Left 9/19/2018     Family History   Problem Relation Age of Onset    Breast cancer Mother 43    Lymphoma Mother     Breast cancer Maternal Aunt      Social History     Socioeconomic History    Marital status:       Spouse name: Not on file    Number of children: Not on file    Years of education: Not on file    Highest education level: Not on file   Occupational History    Not on file   Social Needs    Financial resource strain: Not on file    Food insecurity     Worry: Not on file     Inability: Not on file    Transportation needs     Medical: Not on file     Non-medical: Not on file   Tobacco Use    Smoking status: Never Smoker    Smokeless tobacco: Never Used   Substance and Sexual Activity    Alcohol use: No    Drug use: No    Sexual activity: Not on file   Lifestyle    Physical activity     Days per week: Not on file     Minutes per session: Not on file    Stress: Not on file   Relationships    Social connections     Talks on phone: Not on file     Gets together: Not on file     Attends Spiritism service: Not on file     Active member of club or organization: Not on file     Attends meetings of clubs or organizations: Not on file     Relationship status: Not on file    Intimate partner violence     Fear of current or ex partner: Not on file     Emotionally abused: Not on file     Physically abused: Not on file     Forced sexual activity: Not on file   Other Topics Concern    Not on file   Social History Narrative    Not on file       Current Outpatient Medications:     ALPRAZolam (XANAX) 1 mg tablet, Take 1 tablet (1 mg total) by mouth 2 (two) times a day, Disp: 60 tablet, Rfl: 0    aspirin 325 mg tablet, Take 325 mg by mouth as needed for mild pain, Disp: , Rfl:     Calcium-Phosphorus-Vitamin D (CALCIUM GUMMIES PO), Take 1 capsule by mouth 3 (three) times a day, Disp: , Rfl:     glycerin adult 2 g suppository, Insert 1 suppository into the rectum as needed for constipation, Disp: , Rfl:     HYDROmorphone (DILAUDID) 4 mg tablet, take 1 tablet (4MG)  by oral route  every 4 - 6 hours as needed, Disp: , Rfl:     lidocaine (XYLOCAINE) 5 % ointment, Apply topically as needed for mild pain, Disp: 35 44 g, Rfl: 0    Morphine Sulfate ER 30 MG TBEA, Take 1 tablet by mouth 3 (three) times a day, Disp: , Rfl:     multivitamin (THERAGRAN) TABS, Take 1 tablet by mouth daily, Disp: , Rfl:     Cholecalciferol (VITAMIN D3 PO), Take 1 capsule by mouth daily, Disp: , Rfl:   Allergies   Allergen Reactions    Triamcinolone Rash and Blisters    Acetaminophen      Other reaction(s): temp paralysis, red flushing ski    Clindamycin GI Intolerance    Corticosteroids      Oral and IV    Ibuprofen     Lyrica [Pregabalin]     Penicillins Other (See Comments)    Prochlorperazine Other (See Comments)    Neurontin [Gabapentin] Rash       Review of Systems:  Review of Systems   Constitutional: Positive for fatigue  HENT: Negative  Reports feeling "like her breath is hot" sometimes   Eyes: Positive for visual disturbance (cataracts)  Respiratory: Positive for shortness of breath (with exertion)  Cardiovascular: Negative  Gastrointestinal: Negative  Endocrine: Negative  Genitourinary: Negative  Musculoskeletal: Positive for arthralgias and back pain  Tenderness left axilla   Skin: Negative  Reports redness/blisters on left side of back that come and go   Allergic/Immunologic: Negative  Neurological: Positive for dizziness (at times) and numbness (neuropathy right foot, left hand)  Hematological: Negative  Psychiatric/Behavioral: Positive for sleep disturbance  The patient is nervous/anxious  Vitals:    01/21/21 1328   Height: 5' 3" (1 6 m)        Pain Score:   8      Imaging:No results found

## 2021-01-27 ENCOUNTER — TELEPHONE (OUTPATIENT)
Dept: FAMILY MEDICINE CLINIC | Facility: CLINIC | Age: 66
End: 2021-01-27

## 2021-01-27 NOTE — TELEPHONE ENCOUNTER
SIGNATURES NEEDED FOR [facility] FORM RECEIVED VIA FAX  WILL BE PLACED IN YOUR BIN AT ASSIGNED DELIVERY TIMES

## 2021-02-03 ENCOUNTER — TELEPHONE (OUTPATIENT)
Dept: FAMILY MEDICINE CLINIC | Facility: CLINIC | Age: 66
End: 2021-02-03

## 2021-02-03 DIAGNOSIS — Z85.3 HISTORY OF CANCER OF LEFT BREAST: ICD-10-CM

## 2021-02-03 NOTE — TELEPHONE ENCOUNTER
Kirk Erickson SIGNATURES NEEDED FOR [facility] FORM RECEIVED VIA FAX  WILL BE PLACED IN YOUR BIN AT ASSIGNED DELIVERY TIMES        Preceptor signature required

## 2021-02-05 DIAGNOSIS — Z85.3 HISTORY OF CANCER OF LEFT BREAST: ICD-10-CM

## 2021-02-05 RX ORDER — ALPRAZOLAM 1 MG/1
1 TABLET ORAL 2 TIMES DAILY
Qty: 60 TABLET | Refills: 0 | Status: SHIPPED | OUTPATIENT
Start: 2021-02-05 | End: 2021-03-04 | Stop reason: SDUPTHER

## 2021-02-05 RX ORDER — ALPRAZOLAM 1 MG/1
1 TABLET ORAL 2 TIMES DAILY
Qty: 60 TABLET | Refills: 0 | OUTPATIENT
Start: 2021-02-05

## 2021-03-04 DIAGNOSIS — F43.22 ADJUSTMENT DISORDER WITH ANXIETY: Primary | ICD-10-CM

## 2021-03-04 DIAGNOSIS — Z85.3 HISTORY OF CANCER OF LEFT BREAST: ICD-10-CM

## 2021-03-04 RX ORDER — ALPRAZOLAM 1 MG/1
1 TABLET ORAL 2 TIMES DAILY
Qty: 60 TABLET | Refills: 0 | Status: SHIPPED | OUTPATIENT
Start: 2021-03-04 | End: 2021-04-02 | Stop reason: SDUPTHER

## 2021-03-04 RX ORDER — NALOXONE HYDROCHLORIDE 4 MG/.1ML
SPRAY NASAL
Qty: 1 EACH | Refills: 1 | Status: SHIPPED | OUTPATIENT
Start: 2021-03-04 | End: 2022-08-09

## 2021-03-10 ENCOUNTER — TELEPHONE (OUTPATIENT)
Dept: FAMILY MEDICINE CLINIC | Facility: CLINIC | Age: 66
End: 2021-03-10

## 2021-03-10 DIAGNOSIS — Z23 ENCOUNTER FOR IMMUNIZATION: ICD-10-CM

## 2021-03-10 NOTE — TELEPHONE ENCOUNTER
SIGNATURES NEEDED FOR [facility] ( order # [id number]) ( discharged from pt) FORM RECEIVED VIA FAX  WILL BE PLACED IN YOUR BIN AT ASSIGNED DELIVERY TIMES

## 2021-03-12 ENCOUNTER — TELEPHONE (OUTPATIENT)
Dept: FAMILY MEDICINE CLINIC | Facility: CLINIC | Age: 66
End: 2021-03-12

## 2021-03-12 NOTE — TELEPHONE ENCOUNTER
SIGNATURES NEEDED FOR [facility] FORM RECEIVED VIA FAX  WILL BE PLACED IN YOUR BIN AT ASSIGNED DELIVERY TIMES  Airway patent, Nasal mucosa clear. Mouth with normal mucosa.

## 2021-03-22 ENCOUNTER — IMMUNIZATIONS (OUTPATIENT)
Dept: FAMILY MEDICINE CLINIC | Facility: HOSPITAL | Age: 66
End: 2021-03-22

## 2021-03-22 DIAGNOSIS — Z23 ENCOUNTER FOR IMMUNIZATION: Primary | ICD-10-CM

## 2021-03-22 PROCEDURE — 91301 SARS-COV-2 / COVID-19 MRNA VACCINE (MODERNA) 100 MCG: CPT

## 2021-03-22 PROCEDURE — 0011A SARS-COV-2 / COVID-19 MRNA VACCINE (MODERNA) 100 MCG: CPT

## 2021-03-25 ENCOUNTER — OFFICE VISIT (OUTPATIENT)
Dept: FAMILY MEDICINE CLINIC | Facility: CLINIC | Age: 66
End: 2021-03-25

## 2021-03-25 ENCOUNTER — TELEPHONE (OUTPATIENT)
Dept: FAMILY MEDICINE CLINIC | Facility: CLINIC | Age: 66
End: 2021-03-25

## 2021-03-25 DIAGNOSIS — M48.061 DEGENERATIVE LUMBAR SPINAL STENOSIS: ICD-10-CM

## 2021-03-25 DIAGNOSIS — I10 HIGH BLOOD PRESSURE DETERMINED BY EXAMINATION: ICD-10-CM

## 2021-03-25 DIAGNOSIS — Z11.4 ENCOUNTER FOR SCREENING FOR HIV: ICD-10-CM

## 2021-03-25 DIAGNOSIS — Z85.3 HISTORY OF CANCER OF LEFT BREAST: ICD-10-CM

## 2021-03-25 DIAGNOSIS — M41.25 OTHER IDIOPATHIC SCOLIOSIS, THORACOLUMBAR REGION: ICD-10-CM

## 2021-03-25 DIAGNOSIS — M85.88 OSTEOPENIA OF SPINE: ICD-10-CM

## 2021-03-25 DIAGNOSIS — F43.22 ADJUSTMENT DISORDER WITH ANXIETY: Primary | ICD-10-CM

## 2021-03-25 DIAGNOSIS — Z11.59 NEED FOR HEPATITIS C SCREENING TEST: ICD-10-CM

## 2021-03-25 DIAGNOSIS — R91.1 LUNG NODULE: ICD-10-CM

## 2021-03-25 DIAGNOSIS — Z12.11 SCREENING FOR COLON CANCER: ICD-10-CM

## 2021-03-25 PROCEDURE — 99214 OFFICE O/P EST MOD 30 MIN: CPT | Performed by: FAMILY MEDICINE

## 2021-03-25 PROCEDURE — 1160F RVW MEDS BY RX/DR IN RCRD: CPT | Performed by: FAMILY MEDICINE

## 2021-03-25 PROCEDURE — 1101F PT FALLS ASSESS-DOCD LE1/YR: CPT | Performed by: FAMILY MEDICINE

## 2021-03-25 PROCEDURE — 1036F TOBACCO NON-USER: CPT | Performed by: FAMILY MEDICINE

## 2021-03-25 PROCEDURE — 3725F SCREEN DEPRESSION PERFORMED: CPT | Performed by: FAMILY MEDICINE

## 2021-03-25 PROCEDURE — 3288F FALL RISK ASSESSMENT DOCD: CPT | Performed by: FAMILY MEDICINE

## 2021-03-25 PROCEDURE — 3008F BODY MASS INDEX DOCD: CPT | Performed by: FAMILY MEDICINE

## 2021-03-25 RX ORDER — LIDOCAINE 50 MG/G
OINTMENT TOPICAL AS NEEDED
Qty: 35.44 G | Refills: 0 | Status: SHIPPED | OUTPATIENT
Start: 2021-03-25 | End: 2021-04-30 | Stop reason: SDUPTHER

## 2021-03-25 NOTE — PATIENT INSTRUCTIONS
Go for blood work within the next 1-2 weeks  Needs to be fasting for at least 8-10 hours (no eating or drinking anything)

## 2021-03-25 NOTE — ASSESSMENT & PLAN NOTE
Follow-up oncologist as needed  Follow-up breast surgeon as needed  Still taking xanax over diagnosis and now with worry over COVID-pandemic

## 2021-03-25 NOTE — PROGRESS NOTES
Assessment/Plan:    History of cancer of left breast  Follow-up oncologist as needed  Follow-up breast surgeon as needed  Still taking xanax over diagnosis and now with worry over COVID-pandemic    Lung nodule  -Patient was supposed to have had repeat CT chest in 3 to 6 months from last CT which was in October 2019 (6 mm calcified granuloma in LLL)  -Patient would rather talk to her radiation oncologist regarding the need to repeat or not    Adjustment disorder with anxiety  Since diagnosis of breast cancer, has been on xanax  Now worried about COVID-pandemic especially with her condition    Degenerative lumbar spinal stenosis  Last saw physiatrist in December 2020  Continue pain medications   Continue PT home exercises    Osteopenia of spine  Continue calcium+vitamin D  Repeat dexa scan is due - last dexa scan was in January 2019 and recommended repeat in 18-24 months    High blood pressure determined by examination  Patient checks BP at home, average 120s/80s  Admitted to drinking lots of coffee throughout the day - recommended she cut back significantly and to limit sodium intake to less than 2000 mg per day  Will continue to monitor blood pressure       Diagnoses and all orders for this visit:    Adjustment disorder with anxiety    Other idiopathic scoliosis, thoracolumbar region  -     lidocaine (XYLOCAINE) 5 % ointment; Apply topically as needed for mild pain    Lung nodule    History of cancer of left breast  -     Comprehensive metabolic panel; Future  -     CBC and differential; Future  -     Vitamin D 25 hydroxy; Future  -     Lipid panel; Future  -     TSH, 3rd generation with Free T4 reflex; Future    Degenerative lumbar spinal stenosis    Need for hepatitis C screening test  -     Hepatitis C antibody; Future    Encounter for screening for HIV  -     HIV 1/2 Antigen/Antibody (4th Generation) w Reflex [facility]; Future    Osteopenia of spine  -     DXA bone density spine hip and pelvis;  Future    Screening for colon cancer  Comments:  patient would rather have cologuard and not colonoscopy - info to be given to patient    High blood pressure determined by examination    Other orders  -     Cancel: Mammo screening bilateral w cad; Future  -     Cancel: Ambulatory referral to Gastroenterology; Future          Subjective:      Patient ID: Nancy Harris is a 72 y o  female  Very stressed about her mother's estate being divided  At home the blood pressure is 120/80s  Drinks lots of coffee throughout the day  Patient does not want to go for CT lung  She will speak to radiation oncologist regarding the need to repeat  Would rather have cologuard rather than colonoscopy                                  The following portions of the patient's history were reviewed and updated as appropriate: allergies, current medications, past family history, past medical history, past social history, past surgical history and problem list     Review of Systems   Constitutional: Negative for chills, fatigue, fever and unexpected weight change  HENT: Negative for congestion, ear pain, rhinorrhea and sore throat  Eyes: Negative for pain and visual disturbance  Respiratory: Negative for cough, chest tightness and shortness of breath  Cardiovascular: Negative for chest pain, palpitations and leg swelling  Gastrointestinal: Negative for abdominal pain, constipation, diarrhea, nausea and vomiting  Genitourinary: Negative for difficulty urinating, dysuria and urgency  Musculoskeletal: Negative for arthralgias and back pain  Skin: Negative for rash  Neurological: Negative for dizziness, numbness and headaches  Psychiatric/Behavioral: Negative for behavioral problems and suicidal ideas  The patient is nervous/anxious            Objective:      /90   Pulse 82   Temp 97 5 °F (36 4 °C) (Temporal)   Resp 20   Ht 5' 3" (1 6 m)   Wt 54 7 kg (120 lb 9 6 oz)   SpO2 98%   BMI 21 36 kg/m²            Physical Exam  Constitutional:       Appearance: She is well-developed  HENT:      Head: Normocephalic and atraumatic  Right Ear: External ear normal       Left Ear: External ear normal       Nose: Nose normal    Eyes:      Conjunctiva/sclera: Conjunctivae normal       Pupils: Pupils are equal, round, and reactive to light  Neck:      Musculoskeletal: Normal range of motion and neck supple  Cardiovascular:      Rate and Rhythm: Normal rate and regular rhythm  Heart sounds: Normal heart sounds  Pulmonary:      Effort: Pulmonary effort is normal       Breath sounds: Normal breath sounds  Abdominal:      General: Bowel sounds are normal       Palpations: Abdomen is soft  Musculoskeletal: Normal range of motion  Skin:     General: Skin is warm  Neurological:      Mental Status: She is alert and oriented to person, place, and time  Psychiatric:         Behavior: Behavior normal          Thought Content: Thought content does not include homicidal or suicidal ideation

## 2021-03-25 NOTE — ASSESSMENT & PLAN NOTE
-Patient was supposed to have had repeat CT chest in 3 to 6 months from last CT which was in October 2019 (6 mm calcified granuloma in LLL)  -Patient would rather talk to her radiation oncologist regarding the need to repeat or not

## 2021-03-31 VITALS
HEART RATE: 82 BPM | TEMPERATURE: 97.5 F | DIASTOLIC BLOOD PRESSURE: 90 MMHG | SYSTOLIC BLOOD PRESSURE: 140 MMHG | WEIGHT: 120.6 LBS | RESPIRATION RATE: 20 BRPM | HEIGHT: 63 IN | OXYGEN SATURATION: 98 % | BODY MASS INDEX: 21.37 KG/M2

## 2021-03-31 PROBLEM — I10 HIGH BLOOD PRESSURE DETERMINED BY EXAMINATION: Status: ACTIVE | Noted: 2021-03-31

## 2021-03-31 NOTE — ASSESSMENT & PLAN NOTE
Patient checks BP at home, average 120s/80s  Admitted to drinking lots of coffee throughout the day - recommended she cut back significantly and to limit sodium intake to less than 2000 mg per day  Will continue to monitor blood pressure

## 2021-03-31 NOTE — ASSESSMENT & PLAN NOTE
Continue calcium+vitamin D  Repeat dexa scan is due - last dexa scan was in January 2019 and recommended repeat in 18-24 months

## 2021-04-02 DIAGNOSIS — Z85.3 HISTORY OF CANCER OF LEFT BREAST: ICD-10-CM

## 2021-04-02 RX ORDER — ALPRAZOLAM 1 MG/1
1 TABLET ORAL 2 TIMES DAILY
Qty: 60 TABLET | Refills: 0 | Status: SHIPPED | OUTPATIENT
Start: 2021-04-02 | End: 2021-04-30 | Stop reason: SDUPTHER

## 2021-04-07 ENCOUNTER — APPOINTMENT (OUTPATIENT)
Dept: LAB | Facility: HOSPITAL | Age: 66
End: 2021-04-07
Attending: FAMILY MEDICINE
Payer: COMMERCIAL

## 2021-04-07 DIAGNOSIS — Z85.3 HISTORY OF CANCER OF LEFT BREAST: ICD-10-CM

## 2021-04-07 DIAGNOSIS — Z11.4 ENCOUNTER FOR SCREENING FOR HIV: ICD-10-CM

## 2021-04-07 DIAGNOSIS — Z11.59 NEED FOR HEPATITIS C SCREENING TEST: ICD-10-CM

## 2021-04-07 LAB
25(OH)D3 SERPL-MCNC: 46.3 NG/ML (ref 30–100)
ALBUMIN SERPL BCP-MCNC: 4.3 G/DL (ref 3–5.2)
ALP SERPL-CCNC: 86 U/L (ref 43–122)
ALT SERPL W P-5'-P-CCNC: 34 U/L
ANION GAP SERPL CALCULATED.3IONS-SCNC: 7 MMOL/L (ref 5–14)
AST SERPL W P-5'-P-CCNC: 40 U/L (ref 14–36)
BILIRUB SERPL-MCNC: 1.6 MG/DL
BUN SERPL-MCNC: 21 MG/DL (ref 5–25)
CALCIUM SERPL-MCNC: 9.4 MG/DL (ref 8.4–10.2)
CHLORIDE SERPL-SCNC: 101 MMOL/L (ref 97–108)
CHOLEST SERPL-MCNC: 186 MG/DL
CO2 SERPL-SCNC: 32 MMOL/L (ref 22–30)
CREAT SERPL-MCNC: 0.74 MG/DL (ref 0.6–1.2)
EOSINOPHIL # BLD AUTO: 0.27 THOUSAND/UL (ref 0–0.4)
EOSINOPHIL NFR BLD MANUAL: 6 % (ref 0–6)
ERYTHROCYTE [DISTWIDTH] IN BLOOD BY AUTOMATED COUNT: 13 %
GFR SERPL CREATININE-BSD FRML MDRD: 85 ML/MIN/1.73SQ M
GLUCOSE P FAST SERPL-MCNC: 84 MG/DL (ref 70–99)
HCT VFR BLD AUTO: 43 % (ref 36–46)
HDLC SERPL-MCNC: 43 MG/DL
HGB BLD-MCNC: 14.1 G/DL (ref 12–16)
LDLC SERPL CALC-MCNC: 119 MG/DL
LYMPHOCYTES # BLD AUTO: 0.63 THOUSAND/UL (ref 0.5–4)
LYMPHOCYTES # BLD AUTO: 14 % (ref 25–45)
MCH RBC QN AUTO: 29.6 PG (ref 26–34)
MCHC RBC AUTO-ENTMCNC: 32.8 G/DL (ref 31–36)
MCV RBC AUTO: 90 FL (ref 80–100)
MONOCYTES # BLD AUTO: 0.45 THOUSAND/UL (ref 0.2–0.9)
MONOCYTES NFR BLD AUTO: 10 % (ref 1–10)
NEUTS SEG # BLD: 3.15 THOUSAND/UL (ref 1.8–7.8)
NEUTS SEG NFR BLD AUTO: 70 %
NONHDLC SERPL-MCNC: 143 MG/DL
PLATELET # BLD AUTO: 204 THOUSANDS/UL (ref 150–450)
PLATELET BLD QL SMEAR: ADEQUATE
PMV BLD AUTO: 8.7 FL (ref 8.9–12.7)
POTASSIUM SERPL-SCNC: 4.1 MMOL/L (ref 3.6–5)
PROT SERPL-MCNC: 7.6 G/DL (ref 5.9–8.4)
RBC # BLD AUTO: 4.76 MILLION/UL (ref 4–5.2)
RBC MORPH BLD: NORMAL
SODIUM SERPL-SCNC: 140 MMOL/L (ref 137–147)
TOTAL CELLS COUNTED SPEC: 100
TRIGL SERPL-MCNC: 119 MG/DL
TSH SERPL DL<=0.05 MIU/L-ACNC: 3.16 UIU/ML (ref 0.47–4.68)
WBC # BLD AUTO: 4.5 THOUSAND/UL (ref 4.5–11)

## 2021-04-07 PROCEDURE — 87389 HIV-1 AG W/HIV-1&-2 AB AG IA: CPT

## 2021-04-07 PROCEDURE — 82306 VITAMIN D 25 HYDROXY: CPT

## 2021-04-07 PROCEDURE — 85027 COMPLETE CBC AUTOMATED: CPT

## 2021-04-07 PROCEDURE — 36415 COLL VENOUS BLD VENIPUNCTURE: CPT

## 2021-04-07 PROCEDURE — 80061 LIPID PANEL: CPT

## 2021-04-07 PROCEDURE — 85007 BL SMEAR W/DIFF WBC COUNT: CPT

## 2021-04-07 PROCEDURE — 80053 COMPREHEN METABOLIC PANEL: CPT

## 2021-04-07 PROCEDURE — 86803 HEPATITIS C AB TEST: CPT

## 2021-04-07 PROCEDURE — 84443 ASSAY THYROID STIM HORMONE: CPT

## 2021-04-08 LAB
HCV AB SER QL: ABNORMAL
HIV 1+2 AB+HIV1 P24 AG SERPL QL IA: NORMAL

## 2021-04-12 ENCOUNTER — TELEPHONE (OUTPATIENT)
Dept: RADIATION ONCOLOGY | Facility: HOSPITAL | Age: 66
End: 2021-04-12

## 2021-04-12 ENCOUNTER — TELEPHONE (OUTPATIENT)
Dept: RADIATION ONCOLOGY | Facility: CLINIC | Age: 66
End: 2021-04-12

## 2021-04-12 NOTE — TELEPHONE ENCOUNTER
Patient would like to talk with Dr Kiel Barakat regarding her most recent bloodwork  She is concerned that her bloodwork may have been impacted by her first Covid vaccine and wants to know if it is safe for her to have the second Moderna vaccine  She trusts Dr Kiel Barakat and would like to know what he thinks

## 2021-04-12 NOTE — TELEPHONE ENCOUNTER
Returned patients call regarding bloodwork and COVID vaccine  Left message  Informed her that her question was relayed to Dr Татьяна Lawson, and her COVID vaccine did not impact her bloodwork

## 2021-04-21 ENCOUNTER — IMMUNIZATIONS (OUTPATIENT)
Dept: FAMILY MEDICINE CLINIC | Facility: HOSPITAL | Age: 66
End: 2021-04-21

## 2021-04-21 DIAGNOSIS — Z11.59 NEED FOR HEPATITIS C SCREENING TEST: Primary | ICD-10-CM

## 2021-04-21 DIAGNOSIS — Z23 ENCOUNTER FOR IMMUNIZATION: Primary | ICD-10-CM

## 2021-04-21 PROCEDURE — 91301 SARS-COV-2 / COVID-19 MRNA VACCINE (MODERNA) 100 MCG: CPT

## 2021-04-21 PROCEDURE — 0012A SARS-COV-2 / COVID-19 MRNA VACCINE (MODERNA) 100 MCG: CPT

## 2021-04-21 NOTE — PROGRESS NOTES
Spoke to patient who told me she was frustrated when saw the result of Hepatitis C antibody  She would like it rechecked since got blood work about 2 weeks after she received COVID vaccine  Agreed to recheck hepatitis c one week before next office visit  Hepatitis C antibody and HCV RNA orders placed  Patient expressed understanding and had all questions and concerns addressed

## 2021-04-30 DIAGNOSIS — M41.25 OTHER IDIOPATHIC SCOLIOSIS, THORACOLUMBAR REGION: ICD-10-CM

## 2021-04-30 DIAGNOSIS — Z85.3 HISTORY OF CANCER OF LEFT BREAST: ICD-10-CM

## 2021-05-03 RX ORDER — LIDOCAINE 50 MG/G
OINTMENT TOPICAL AS NEEDED
Qty: 35.44 G | Refills: 0 | Status: SHIPPED | OUTPATIENT
Start: 2021-05-03 | End: 2021-05-28 | Stop reason: SDUPTHER

## 2021-05-03 RX ORDER — ALPRAZOLAM 1 MG/1
1 TABLET ORAL 2 TIMES DAILY
Qty: 60 TABLET | Refills: 0 | Status: SHIPPED | OUTPATIENT
Start: 2021-05-03 | End: 2021-06-01 | Stop reason: SDUPTHER

## 2021-05-28 DIAGNOSIS — Z85.3 HISTORY OF CANCER OF LEFT BREAST: ICD-10-CM

## 2021-05-28 DIAGNOSIS — M41.25 OTHER IDIOPATHIC SCOLIOSIS, THORACOLUMBAR REGION: ICD-10-CM

## 2021-05-28 RX ORDER — ALPRAZOLAM 1 MG/1
1 TABLET ORAL 2 TIMES DAILY
Qty: 60 TABLET | Refills: 0 | OUTPATIENT
Start: 2021-05-28

## 2021-05-28 RX ORDER — LIDOCAINE 50 MG/G
OINTMENT TOPICAL AS NEEDED
Qty: 35.44 G | Refills: 0 | Status: SHIPPED | OUTPATIENT
Start: 2021-05-28 | End: 2021-06-28 | Stop reason: SDUPTHER

## 2021-05-28 NOTE — TELEPHONE ENCOUNTER
Xanax not due to be filled until 6/3/21 since last one was picked up on 5/3/21        Patient is due for pneumovax in October 2021

## 2021-06-01 DIAGNOSIS — Z85.3 HISTORY OF CANCER OF LEFT BREAST: ICD-10-CM

## 2021-06-03 DIAGNOSIS — Z85.3 HISTORY OF CANCER OF LEFT BREAST: ICD-10-CM

## 2021-06-03 RX ORDER — ALPRAZOLAM 1 MG/1
1 TABLET ORAL 2 TIMES DAILY
Qty: 60 TABLET | Refills: 0 | Status: CANCELLED | OUTPATIENT
Start: 2021-06-03

## 2021-06-03 RX ORDER — ALPRAZOLAM 1 MG/1
1 TABLET ORAL 2 TIMES DAILY
Qty: 60 TABLET | Refills: 0 | Status: SHIPPED | OUTPATIENT
Start: 2021-06-03 | End: 2021-06-28 | Stop reason: SDUPTHER

## 2021-06-03 RX ORDER — ALPRAZOLAM 1 MG/1
TABLET ORAL
Qty: 60 TABLET | Refills: 0 | OUTPATIENT
Start: 2021-06-03

## 2021-06-03 NOTE — TELEPHONE ENCOUNTER
Pt called and stated she made the request on 6/1 for this med so it would be available for  today  Pt upset that medication is not ready at pharmacy  She doesn't always have a ride to  meds but she was one for today and wants the medication sent  Please advise

## 2021-07-14 RX ORDER — MORPHINE SULFATE 30 MG/1
30 TABLET, FILM COATED, EXTENDED RELEASE ORAL EVERY 8 HOURS
COMMUNITY
Start: 2021-06-28 | End: 2021-07-28

## 2021-07-14 RX ORDER — HYDROMORPHONE HYDROCHLORIDE 4 MG/1
4 TABLET ORAL 4 TIMES DAILY PRN
COMMUNITY
Start: 2021-06-27 | End: 2021-07-27

## 2021-07-14 RX ORDER — MORPHINE SULFATE 30 MG/1
TABLET, FILM COATED, EXTENDED RELEASE ORAL
COMMUNITY
Start: 2021-06-28 | End: 2021-08-18

## 2021-07-14 RX ORDER — ASPIRIN 325 MG
325 TABLET ORAL DAILY
COMMUNITY
End: 2021-08-18 | Stop reason: SDUPTHER

## 2021-07-14 NOTE — PROGRESS NOTES
FAMILY PRACTICE PRE-OPERATIVE EVALUATION  [facility] PHYSICIAN GROUP [facility]    NAME: Yomi Arce  AGE: 72 y o  SEX: female  : 1955     DATE: 2021    Family Practice Pre-Operative Evaluation      Chief Complaint: Pre-operative Evaluation     Surgery: Cataract surgery  Anticipated Date of Surgery: 21-right eye, left eye-21     History of Present Illness:     Patient has no prior history of bleeding issues but has history of DVT's of the right lower extremity requiring 3 months of Eliquis use  Chronic conditions, medications and allergies were reviewed  There is no currently active infectious process  Assessment of Cardiac Risk:  · No unstable or severe angina or MI in the last 6 weeks or history of stent placement in the last year   · No decompensated heart failure (e g  New onset heart failure, NYHA functional class IV heart failure, or worsening existing heart failure) in past 3 mos  · No severe heart valve disease including aortic stenosis or symptomatic mitral stenosis     Exercise Capacity:  · Able to walk 4 blocks without symptoms  · Able to walk 2 flights without symptoms-uses stationary bike    NO Prior Anesthesia Reactions    No prolonged steroid use in past 6 mos  P A T  If done reviewed  Review of Systems:     Review of Systems   Constitutional: Negative for chills, fatigue and fever  HENT: Negative for sore throat  Respiratory: Negative for cough, chest tightness and shortness of breath  Cardiovascular: Negative for chest pain and leg swelling  Gastrointestinal: Negative for constipation, diarrhea, nausea and vomiting  Endocrine: Negative for polydipsia, polyphagia and polyuria  Genitourinary: Negative for dysuria  Musculoskeletal: Positive for back pain  Negative for arthralgias  Skin: Negative for rash  Neurological: Negative for dizziness, light-headedness and headaches     Psychiatric/Behavioral: Negative for agitation and behavioral problems  Current Problem List:     Patient Active Problem List   Diagnosis    History of total hip arthroplasty    Chronic deep vein thrombosis (DVT) of distal vein of right lower extremity (HCC)    Malignant neoplasm of left breast in female, estrogen receptor positive ([facility] [address] )    Osteopenia of spine    Other idiopathic scoliosis, thoracolumbar region    History of cancer of left breast    Lung nodule    Adjustment disorder with anxiety    Degenerative lumbar spinal stenosis    High blood pressure determined by examination     Allergies: Allergies   Allergen Reactions    Triamcinolone Rash and Blisters    Acetaminophen Other (See Comments)     Other reaction(s): temp paralysis, red flushing ski  paralysis    Clindamycin GI Intolerance and Other (See Comments)    Corticosteroids      Oral and IV    Ibuprofen Other (See Comments)     Severe headache    Lyrica [Pregabalin]     Penicillins Other (See Comments)    Prochlorperazine Other (See Comments)    Gabapentin Rash and Other (See Comments)     Difficultly breathing       Current Medications:       Current Outpatient Medications:     HYDROmorphone (DILAUDID) 4 mg tablet, Take 4 mg by mouth 4 (four) times a day as needed, Disp: , Rfl:     morphine (MS CONTIN) 30 mg 12 hr tablet, Take 30 mg by mouth every 8 (eight) hours, Disp: , Rfl:     ALPRAZolam (XANAX) 1 mg tablet, Take 1 tablet (1 mg total) by mouth 2 (two) times a day Reviewed PDMP   Last filled on 6/3/21, Disp: 60 tablet, Rfl: 0    aspirin 325 mg tablet, Take 325 mg by mouth as needed for mild pain, Disp: , Rfl:     aspirin 325 mg tablet, Take 325 mg by mouth daily, Disp: , Rfl:     Calcium-Phosphorus-Vitamin D (CALCIUM GUMMIES PO), Take 1 capsule by mouth 3 (three) times a day, Disp: , Rfl:     Cholecalciferol (VITAMIN D3 PO), Take 1 capsule by mouth daily, Disp: , Rfl:     glycerin adult 2 g suppository, Insert 1 suppository into the rectum as needed for constipation, Disp: , Rfl:     HYDROmorphone (DILAUDID) 4 mg tablet, take 1 tablet (4MG)  by oral route  every 4 - 6 hours as needed, Disp: , Rfl:     lidocaine (XYLOCAINE) 5 % ointment, Apply topically as needed for mild pain, Disp: 35 44 g, Rfl: 0    morphine (MS CONTIN) 30 mg 12 hr tablet, , Disp: , Rfl:     Morphine Sulfate ER 30 MG TBEA, Take 1 tablet by mouth 3 (three) times a day, Disp: , Rfl:     multivitamin (THERAGRAN) TABS, Take 1 tablet by mouth daily, Disp: , Rfl:     naloxone (NARCAN) 4 mg/0 1 mL nasal spray, Administer 1 spray into a nostril  If no response after 2-3 minutes, give another dose in the other nostril using a new spray , Disp: 1 each, Rfl: 1    Past Medical History:       Past Medical History:   Diagnosis Date    Breast cancer ([facility] [address] )     DJD (degenerative joint disease)     History of blood clots     Osteoarthritis     left hip    Osteoarthritis     Pain         Past Surgical History:   Procedure Laterality Date    BREAST IMPLANT Left 06/25/2019    Left breast tissue expander removal  open capsulectomy,gel implant insertion  Dr ETHAN Barrow Jai MASTOPEXY Right 06/25/2019    Right mastopexy for symmetry    OVARY SURGERY      TOTAL HIP ARTHROPLASTY      US GUIDANCE BREAST BIOPSY LEFT EACH ADDITIONAL Left 9/19/2018    US GUIDANCE BREAST BIOPSY LEFT EACH ADDITIONAL Left 9/19/2018    US GUIDED BREAST BIOPSY LEFT COMPLETE Left 9/19/2018        Family History   Problem Relation Age of Onset    Breast cancer Mother 43    Lymphoma Mother     Breast cancer Maternal Aunt         Social History     Socioeconomic History    Marital status:       Spouse name: Not on file    Number of children: Not on file    Years of education: Not on file    Highest education level: Not on file   Occupational History    Not on file   Tobacco Use    Smoking status: Never Smoker    Smokeless tobacco: Never Used   Substance and Sexual Activity    Alcohol use: No    Drug use: No    Sexual activity: Not on file   Other Topics Concern    Not on file   Social History Narrative    Not on file     Social Determinants of Health     Financial Resource Strain:     Difficulty of Paying Living Expenses:    Food Insecurity:     Worried About Running Out of Food in the Last Year:     [address] in the Last Year:    Transportation Needs:     Lack of Transportation (Medical):  Lack of Transportation (Non-Medical):    Physical Activity:     Days of Exercise per Week:     Minutes of Exercise per Session:    Stress:     Feeling of Stress :    Social Connections:     Frequency of Communication with Friends and Family:     Frequency of Social Gatherings with Friends and Family:     Attends [employer] Services:     Active Member of Clubs or Organizations:     Attends Club or Organization Meetings:     Marital Status:    Intimate Partner Violence:     Fear of Current or Ex-Partner:     Emotionally Abused:     Physically Abused:     Sexually Abused:         Physical Exam:     There were no vitals taken for this visit  Physical Exam  Constitutional:       Appearance: She is well-developed  HENT:      Head: Normocephalic and atraumatic  Eyes:      Pupils: Pupils are equal, round, and reactive to light  Cardiovascular:      Rate and Rhythm: Normal rate and regular rhythm  Heart sounds: Normal heart sounds  No murmur heard  No friction rub  No gallop  Pulmonary:      Effort: Pulmonary effort is normal       Breath sounds: Normal breath sounds  No wheezing or rales  Abdominal:      General: Bowel sounds are normal       Palpations: Abdomen is soft  Musculoskeletal:         General: Normal range of motion  Cervical back: Normal range of motion and neck supple  Skin:     General: Skin is warm  Findings: No erythema or rash  Neurological:      Mental Status: She is alert and oriented to person, place, and time     Psychiatric:         Behavior: Behavior normal        Operative site has been examined and clear of skin infection and inflammation  Procedure Note: EKG  Date/Time: 07/15/21 3:16 PM   Interpreted by: Cristina Machado   Indications / Diagnosis: Preoperative clearance  ECG reviewed by me, the Attending Provider: yes   The EKG demonstrates:  Rhythm: normal sinus  Intervals: normal intervals  Axis: normal axis  QRS/Blocks: normal QRS  ST Changes: No acute ST Changes, no STD/ROCK  Assessment & Recommendations:     Patient is medicay stable for planned procedure as detailed above  Surgical Procedure risk category: Low risk    Please stop Aspirin/Vitamins 5 days prior to procedure on both eyes  Patient understood and all questions answered      Patient specific operative risk categegory: Class 1 Risk

## 2021-07-15 ENCOUNTER — CONSULT (OUTPATIENT)
Dept: FAMILY MEDICINE CLINIC | Facility: CLINIC | Age: 66
End: 2021-07-15

## 2021-07-15 VITALS
SYSTOLIC BLOOD PRESSURE: 128 MMHG | DIASTOLIC BLOOD PRESSURE: 74 MMHG | HEART RATE: 78 BPM | RESPIRATION RATE: 18 BRPM | TEMPERATURE: 97.8 F | HEIGHT: 63 IN | BODY MASS INDEX: 21.48 KG/M2 | WEIGHT: 121.2 LBS | OXYGEN SATURATION: 97 %

## 2021-07-15 DIAGNOSIS — Z01.818 PREOP EXAMINATION: Primary | ICD-10-CM

## 2021-07-15 PROCEDURE — 99214 OFFICE O/P EST MOD 30 MIN: CPT | Performed by: FAMILY MEDICINE

## 2021-07-15 PROCEDURE — 93000 ELECTROCARDIOGRAM COMPLETE: CPT | Performed by: FAMILY MEDICINE

## 2021-07-30 DIAGNOSIS — M41.25 OTHER IDIOPATHIC SCOLIOSIS, THORACOLUMBAR REGION: ICD-10-CM

## 2021-07-30 DIAGNOSIS — Z85.3 HISTORY OF CANCER OF LEFT BREAST: ICD-10-CM

## 2021-08-04 RX ORDER — ALPRAZOLAM 1 MG/1
1 TABLET ORAL 2 TIMES DAILY
Qty: 60 TABLET | Refills: 0 | Status: SHIPPED | OUTPATIENT
Start: 2021-08-04 | End: 2021-09-03 | Stop reason: ALTCHOICE

## 2021-08-04 RX ORDER — LIDOCAINE 50 MG/G
OINTMENT TOPICAL AS NEEDED
Qty: 35.44 G | Refills: 0 | Status: SHIPPED | OUTPATIENT
Start: 2021-08-04 | End: 2022-03-02 | Stop reason: SDUPTHER

## 2021-08-18 ENCOUNTER — OFFICE VISIT (OUTPATIENT)
Dept: FAMILY MEDICINE CLINIC | Facility: CLINIC | Age: 66
End: 2021-08-18

## 2021-08-18 ENCOUNTER — TELEPHONE (OUTPATIENT)
Dept: FAMILY MEDICINE CLINIC | Facility: CLINIC | Age: 66
End: 2021-08-18

## 2021-08-18 VITALS
DIASTOLIC BLOOD PRESSURE: 76 MMHG | SYSTOLIC BLOOD PRESSURE: 124 MMHG | BODY MASS INDEX: 21.97 KG/M2 | WEIGHT: 124 LBS | HEART RATE: 87 BPM | RESPIRATION RATE: 18 BRPM | OXYGEN SATURATION: 98 % | HEIGHT: 63 IN | TEMPERATURE: 97.6 F

## 2021-08-18 DIAGNOSIS — R76.8 HEPATITIS C ANTIBODY POSITIVE IN BLOOD: ICD-10-CM

## 2021-08-18 DIAGNOSIS — M48.061 DEGENERATIVE LUMBAR SPINAL STENOSIS: ICD-10-CM

## 2021-08-18 DIAGNOSIS — F43.22 ADJUSTMENT DISORDER WITH ANXIETY: Primary | ICD-10-CM

## 2021-08-18 DIAGNOSIS — C50.812 MALIGNANT NEOPLASM OF OVERLAPPING SITES OF LEFT BREAST IN FEMALE, ESTROGEN RECEPTOR POSITIVE (HCC): ICD-10-CM

## 2021-08-18 DIAGNOSIS — Z17.0 MALIGNANT NEOPLASM OF OVERLAPPING SITES OF LEFT BREAST IN FEMALE, ESTROGEN RECEPTOR POSITIVE (HCC): ICD-10-CM

## 2021-08-18 DIAGNOSIS — R91.1 LUNG NODULE: ICD-10-CM

## 2021-08-18 PROBLEM — G57.00 PIRIFORMIS SYNDROME: Status: ACTIVE | Noted: 2021-04-17

## 2021-08-18 PROCEDURE — 99214 OFFICE O/P EST MOD 30 MIN: CPT | Performed by: FAMILY MEDICINE

## 2021-08-18 RX ORDER — NEOMYCIN SULFATE, POLYMYXIN B SULFATE AND DEXAMETHASONE 3.5; 10000; 1 MG/ML; [USP'U]/ML; MG/ML
SUSPENSION/ DROPS OPHTHALMIC
COMMUNITY
Start: 2021-07-25 | End: 2021-09-02

## 2021-08-26 ENCOUNTER — APPOINTMENT (OUTPATIENT)
Dept: LAB | Facility: CLINIC | Age: 66
End: 2021-08-26
Payer: COMMERCIAL

## 2021-08-26 DIAGNOSIS — Z11.59 NEED FOR HEPATITIS C SCREENING TEST: ICD-10-CM

## 2021-08-26 DIAGNOSIS — R76.8 HEPATITIS C ANTIBODY POSITIVE IN BLOOD: ICD-10-CM

## 2021-08-26 PROCEDURE — 86803 HEPATITIS C AB TEST: CPT

## 2021-08-26 PROCEDURE — 36415 COLL VENOUS BLD VENIPUNCTURE: CPT

## 2021-08-26 PROCEDURE — 87522 HEPATITIS C REVRS TRNSCRPJ: CPT

## 2021-08-27 LAB — HCV AB SER QL: ABNORMAL

## 2021-08-28 ENCOUNTER — PATIENT MESSAGE (OUTPATIENT)
Dept: FAMILY MEDICINE CLINIC | Facility: CLINIC | Age: 66
End: 2021-08-28

## 2021-08-28 LAB
HCV RNA SERPL NAA+PROBE-ACNC: NORMAL IU/ML
HCV RNA SERPL NAA+PROBE-ACNC: NORMAL IU/ML
HCV RNA SERPL NAA+PROBE-LOG IU: 7.02 LOG10 IU/ML
TEST INFORMATION: NORMAL

## 2021-08-31 LAB
DIAGNOSTIC IMP SPEC-IMP: NORMAL
HCV AB S/CO SERPL IA: >11 S/CO RATIO (ref 0–0.9)
HCV RNA # SERPL NAA+PROBE: NORMAL IU/ML
HCV RNA SERPL NAA+PROBE-LOG IU: 6.99 LOG10 IU/ML
TEST INFORMATION: NORMAL

## 2021-09-01 ENCOUNTER — TELEPHONE (OUTPATIENT)
Dept: FAMILY MEDICINE CLINIC | Facility: CLINIC | Age: 66
End: 2021-09-01

## 2021-09-01 NOTE — TELEPHONE ENCOUNTER
Pt called left msg stating it was discussed about changing her alprazolam to lorazepam  Please advise pt states she will be due soon

## 2021-09-02 ENCOUNTER — CLINICAL SUPPORT (OUTPATIENT)
Dept: RADIATION ONCOLOGY | Facility: CLINIC | Age: 66
End: 2021-09-02
Attending: RADIOLOGY
Payer: COMMERCIAL

## 2021-09-02 VITALS
DIASTOLIC BLOOD PRESSURE: 90 MMHG | BODY MASS INDEX: 22.34 KG/M2 | WEIGHT: 126.1 LBS | RESPIRATION RATE: 16 BRPM | HEART RATE: 71 BPM | SYSTOLIC BLOOD PRESSURE: 160 MMHG | HEIGHT: 63 IN | OXYGEN SATURATION: 98 % | TEMPERATURE: 96.8 F

## 2021-09-02 DIAGNOSIS — Z17.0 MALIGNANT NEOPLASM OF OVERLAPPING SITES OF LEFT BREAST IN FEMALE, ESTROGEN RECEPTOR POSITIVE (HCC): Primary | ICD-10-CM

## 2021-09-02 DIAGNOSIS — C50.812 MALIGNANT NEOPLASM OF OVERLAPPING SITES OF LEFT BREAST IN FEMALE, ESTROGEN RECEPTOR POSITIVE (HCC): Primary | ICD-10-CM

## 2021-09-02 PROCEDURE — 99211 OFF/OP EST MAY X REQ PHY/QHP: CPT | Performed by: RADIOLOGY

## 2021-09-02 PROCEDURE — 99213 OFFICE O/P EST LOW 20 MIN: CPT | Performed by: RADIOLOGY

## 2021-09-02 NOTE — PROGRESS NOTES
Davina Stark 1955 is a 72 y o  female       Follow up visit   Davina Stark is a 59y o  year old female with a history of an anatomic stage IIIA (pathologic prognostic stage group; IB) grade 1 invasive lobular carcinoma left breast status post nipple sparing mastectomy and sentinel lymph node biopsy with axillary lymph node dissection  There was a close margin in the upper outer and lower outer soft tissue  Due to initial size of her tumor being estimated at greater than 5 cm along with tumor being seen within 1 mm of the soft tissue margins as well as 3 lymph nodes out of 14 lymph nodes being positive for metastatic disease with lymphatic and vascular invasion, she is at increased risk for locally recurrent disease  We recommended postmastectomy radiation therapy to the reconstructed left chest wall, axillary, and supraclavicular regions  She was seen by Dr Chang Mejia and declined postoperative adjuvant chemotherapy due to the potential side effects  She did consent to adjuvant hormonal therapy and was given prescription for letrozole 2 5 mg daily  She discontinued letrozole the end of January 2019 due to adverse effects  She completed radiation therapy on March 8, 2019  She continues to have some left reconstructed breast as well as underlying left chest wall/rib discomfort that extends into the left upper arm at times  She has no lymphedema  She will continue to apply moisturizer and massage the reconstructed left chest wall on a daily basis  She had removal of her tissue expander with placement of permanent gel implant in June 2019 along with right breast mastopexy for symmetry  She was seen by medical oncology April 3, 2019 and declined trying any other aromatase inhibitors  She continues to decline any aromatase inhibitors  She was last seen in radiation oncology on 01/21/21  She returns today for her follow up visit      Patient is followed by pain management for chronic pain    10/22/2020-Mammogram 3D right w cad  IMPRESSION:   Right breast benign findings  Routine follow-up mammogram in 1 year is recommended    (Also of note, periodic ultrasound could be a useful supplement to the screening process in this patient, related to dense mammographic pattern )         ? Mammogram 2021-Patient thinks she had a mammogram at this time  No records found  21 - surgeonAllison -[facility]  Discussed exam findings, test results, review of films, and follow up plan  6 month follow up visit          Upcomin21 Mammogram  21 - surgeonAllison      Oncology History   Malignant neoplasm of left breast in female, estrogen receptor positive ([facility] [address] )   2018 Initial Diagnosis    Malignant neoplasm of left breast in female, estrogen receptor positive ([facility] [address] )     2018 Biopsy    Left breast biopsy x3:  Invasive lobular carcinoma, grade 1/3  ER >90%  NM 90%  HER2 negative     10/9/2018 Surgery    Left sided nipple sparing total mastectomy  Left sided sentinel node mapping and deep biopsy with completion total axillary lymph node dissection  A   Left breast, mastectomy:  - Infiltrating lobular carcinoma, classic type, grade 1/3  - Tumor size:  Estimated to be greater than 5 cm in greatest dimension  - Tumor is seen at or within 1 mm of soft tissue margins, particularly upper outer and lower outer margins   - Extensive lobular carcinoma in situ is also seen  - Lymphatic/vascular invasion is seen  - See synoptic report for further details     B  Left axillary lymph nodes:  - Nine lymph nodes identified showing no metastatic tumor      A  [place] lymph node #1:  - Metastatic carcinoma identified in 2 of 4 lymph nodes with metastatic foci greater 2 mm and without extracapsular extension     B  [place] lymph node #2:  - One lymph node identified showing metastatic carcinoma the metastasis being greater than 2 mm and without extracapsular extension    C   Retroareolar tissue:    - Benign breast tissue showing no atypia or malignancy    - Dr Bill Ingram  - Dr Roselia Bledsoe     10/9/2018 -  Cancer Staged    Stage IIIA - pT3, pN1a, cM0, G1        12/2018 - 1/23/2019 Hormone Therapy    Letrozole 2 5 mg daily   Discontinued due to adverse effects (reported tongue swelling, throat pain upset stomach, and muscle and joint pain)  - Dr Alyce Yo     1/28/2019 - 3/8/2019 Radiation    Plan ID Energy Fractions Dose per Fraction (cGy) Dose Correction (cGy) Total Dose Delivered (cGy) Elapsed Days   BH L CW 6X 13 / 13 200 0 2,600 32   BH L CW Lm 6X 12 / 12 200 0 2,400 30   BH L CW Boost 6E 5 / 5 200 0 1,000 4   BH L PAB 10X 25 / 25 33 0 825 32   BH L Sclav 10X 25 / 25 200 0 5,000 28      - Dr Portillo Love     6/25/2019 Surgery    Left breast tissue expander removal  open capsulectomy,gel implant insertion     Right mastopexy for symmetry         Clinical Trial: no      Health Maintenance   Topic Date Due    Cervical Cancer Screening  Never done    Colorectal Cancer Screening  Never done    Annual Physical  09/05/2019    Breast Cancer Screening: Mammogram  09/11/2020    Influenza Vaccine (1) 09/01/2021    Pneumococcal Vaccine: 65+ Years (1 of 1 - PPSV23) 10/03/2021    Fall Risk  03/25/2022    BMI: Adult  08/18/2022    Depression Screening PHQ  09/02/2022    DTaP,Tdap,and Td Vaccines (2 - Td or Tdap) 10/28/2023    HIV Screening  Completed    Hepatitis C Screening  Completed    COVID-19 Vaccine  Completed    HIB Vaccine  Aged Out    Hepatitis B Vaccine  Aged Out    IPV Vaccine  Aged Out    Hepatitis A Vaccine  Aged Out    Meningococcal ACWY Vaccine  Aged Out    HPV Vaccine  Aged Out       Patient Active Problem List   Diagnosis    History of total hip arthroplasty    Chronic deep vein thrombosis (DVT) of distal vein of right lower extremity ([address] )    Malignant neoplasm of left breast in female, estrogen receptor positive ([address] )    Osteopenia of spine    Other idiopathic (Medical):  Lack of Transportation (Non-Medical):    Physical Activity:     Days of Exercise per Week:     Minutes of Exercise per Session:    Stress:     Feeling of Stress :    Social Connections:     Frequency of Communication with Friends and Family:     Frequency of Social Gatherings with Friends and Family:     Attends [employer] Services:     Active Member of Clubs or Organizations:     Attends Club or Organization Meetings:     Marital Status:    Intimate Partner Violence:     Fear of Current or Ex-Partner:     Emotionally Abused:     Physically Abused:     Sexually Abused:        Current Outpatient Medications:     ALPRAZolam (XANAX) 1 mg tablet, Take 1 tablet (1 mg total) by mouth 2 (two) times a day Reviewed PDMP  Last filled on 6/3/21, Disp: 60 tablet, Rfl: 0    aspirin 325 mg tablet, Take 325 mg by mouth as needed for mild pain, Disp: , Rfl:     Calcium-Phosphorus-Vitamin D (CALCIUM GUMMIES PO), Take 1 capsule by mouth 3 (three) times a day, Disp: , Rfl:     glycerin adult 2 g suppository, Insert 1 suppository into the rectum as needed for constipation, Disp: , Rfl:     HYDROmorphone (DILAUDID) 4 mg tablet, take 1 tablet (4MG)  by oral route  every 4 - 6 hours as needed, Disp: , Rfl:     lidocaine (XYLOCAINE) 5 % ointment, Apply topically as needed for mild pain, Disp: 35 44 g, Rfl: 0    Morphine Sulfate ER 30 MG TBEA, Take 1 tablet by mouth 3 (three) times a day, Disp: , Rfl:     multivitamin (THERAGRAN) TABS, Take 1 tablet by mouth daily, Disp: , Rfl:     naloxone (NARCAN) 4 mg/0 1 mL nasal spray, Administer 1 spray into a nostril   If no response after 2-3 minutes, give another dose in the other nostril using a new spray , Disp: 1 each, Rfl: 1    Cholecalciferol (VITAMIN D3 PO), Take 1 capsule by mouth daily (Patient not taking: Reported on 9/2/2021), Disp: , Rfl:   Allergies   Allergen Reactions    Triamcinolone Rash and Blisters    Acetaminophen Other (See Comments) Other reaction(s): temp paralysis, red flushing ski  paralysis    Clindamycin GI Intolerance and Other (See Comments)    Corticosteroids      Oral and IV    Ibuprofen Other (See Comments)     Severe headache    Lyrica [Pregabalin]     Penicillins Other (See Comments)    Prochlorperazine Other (See Comments)    Gabapentin Rash and Other (See Comments)     Difficultly breathing       Review of Systems:  Review of Systems   Constitutional: Positive for fatigue  Negative for appetite change and fever  HENT: Negative  Eyes: Negative  Negative for visual disturbance (cataracts)  Recent cataract surgery bilaterally   Respiratory: Negative  Negative for cough and shortness of breath  Cardiovascular: Negative  Negative for chest pain  Gastrointestinal: Negative  Negative for abdominal pain  Endocrine: Negative  Genitourinary: Negative  Musculoskeletal: Positive for arthralgias, back pain and gait problem (ambulates with walker)  Tenderness left axilla  Wears back brace for scoliosis   Skin: Negative  Reports redness/blisters on left side of back that come and go   Allergic/Immunologic: Negative  Negative for environmental allergies  Neurological: Positive for numbness (neuropathy right foot, left hand, slight in left foot/toes)  Negative for dizziness and headaches  Hematological: Negative  Psychiatric/Behavioral: Positive for sleep disturbance  The patient is nervous/anxious  Vitals:    09/02/21 1338   BP: 160/90   BP Location: Right arm   Patient Position: Sitting   Pulse: 71   Resp: 16   Temp: (!) 96 8 °F (36 °C)   TempSrc: Temporal   SpO2: 98%   Weight: 57 2 kg (126 lb 1 7 oz)   Height: 5' 3" (1 6 m)                 Imaging:No results found

## 2021-09-02 NOTE — PROGRESS NOTES
Follow-up - Radiation Oncology   Tawnya Jonas 1955 72 y o  female [id number]      History of Present Illness   Cancer Staging  Malignant neoplasm of left breast in female, estrogen receptor positive ([facility] [address] )  Staging form: Breast, AJCC 8th Edition  - Clinical stage from 1/3/2019: Stage IIA (cT3, cN1, cM0, G1, ER: Positive, FL: Positive, HER2: Negative) - Signed by Blake Ch MD on 1/5/2019  Histologic grading system: 3 grade system  Laterality: Left  - Pathologic stage from 1/3/2019: Stage IB (pT3, pN1a, cM0, G1, ER: Positive, FL: Positive, HER2: Negative) - Signed by Blake Ch MD on 1/5/2019  Neoadjuvant therapy: No  Histologic grading system: 3 grade system  Laterality: Left  Stage used in treatment planning: Yes  National guidelines used in treatment planning: Yes      Tawnya Jonas is a 72y o  year old female with a history of an anatomic stage IIIA (pathologic prognostic stage group; IB) grade 1 invasive lobular carcinoma left breast status post nipple sparing mastectomy and sentinel lymph node biopsy with axillary lymph node dissection   There was a close margin in the upper outer and lower outer soft tissue   Due to initial size of her tumor being estimated at greater than 5 cm along with tumor being seen within 1 mm of the soft tissue margins as well as 3 lymph nodes out of 14 lymph nodes being positive for metastatic disease with lymphatic and vascular invasion, she is at increased risk for locally recurrent disease  We recommended postmastectomy radiation therapy to the reconstructed left chest wall, axillary, and supraclavicular regions   She was seen by Dr Tea Lopez and declined postoperative adjuvant chemotherapy due to the potential side effects   She did consent to adjuvant hormonal therapy and was given prescription for letrozole 2 5 mg daily   She discontinued letrozole the end of January 2019 due to adverse effects   She completed radiation therapy on March 8, 2019   She returns today for her follow up visit  Interval History:   Patient is followed by pain management for chronic pain     10/22/2020-Mammogram 3D right w cad  IMPRESSION:   Right breast benign findings  Routine follow-up mammogram in 1 year is recommended    (Also of note, periodic ultrasound could be a useful supplement to the screening process in this patient, related to dense mammographic pattern )      21 - surgeon, Caleb العليwda [facility]  Discussed exam findings, test results, review of films, and follow up plan  6 month follow up visit     Today, she reports less left reconstructed breast, axilla and left rib pain below the breast   She continues with breast massage and overall there is less left breast pain and discomfort   She does report her left breast implant is heavy at times on the chest wall and rib area, but overall she feels there is less inflammation   There is minimal erythema/post treatment changes   She occasionally has a rash in the left collar bone/supraclavicular region and upper back that comes and goes   There is no lymphedema of the left upper extremity   She reports a history of chronic bone and back pain for many years even prior to radiation therapy and "anxiety exacerbates the pain   She continues to have anxiety over the death of her mother 2 5 years ago in [place]   Toshia Marquez is still trying to settle her estate and not getting along with her sister  Babatunde Juárez states she went for chest CT on 10/14/19 for follow up on lung nodule, but has not had a repeat chest CT due to the coronavirus outbreak  Toshia Marquez does not want a follow-up chest CT at this time      Upcomin21 Mammogram  21 - surgeonEvita     Historical Information   Oncology History   Malignant neoplasm of left breast in female, estrogen receptor positive ([facility] [address] )   2018 Initial Diagnosis    Malignant neoplasm of left breast in female, estrogen receptor positive ([facility] [address] )     2018 Biopsy    Left breast biopsy x3:  Invasive lobular carcinoma, grade 1/3  ER >90%  WA 90%  HER2 negative     10/9/2018 Surgery    Left sided nipple sparing total mastectomy  Left sided sentinel node mapping and deep biopsy with completion total axillary lymph node dissection  A   Left breast, mastectomy:  - Infiltrating lobular carcinoma, classic type, grade 1/3  - Tumor size:  Estimated to be greater than 5 cm in greatest dimension  - Tumor is seen at or within 1 mm of soft tissue margins, particularly upper outer and lower outer margins   - Extensive lobular carcinoma in situ is also seen  - Lymphatic/vascular invasion is seen  - See synoptic report for further details     B  Left axillary lymph nodes:  - Nine lymph nodes identified showing no metastatic tumor      A  [place] lymph node #1:  - Metastatic carcinoma identified in 2 of 4 lymph nodes with metastatic foci greater 2 mm and without extracapsular extension     B  [place] lymph node #2:  - One lymph node identified showing metastatic carcinoma the metastasis being greater than 2 mm and without extracapsular extension    C  Retroareolar tissue:    - Benign breast tissue showing no atypia or malignancy    - Dr Talisha Galarza  - Dr Obinna Curtis     10/9/2018 -  Cancer Staged    Stage IIIA - pT3, pN1a, cM0, G1        12/2018 - 1/23/2019 Hormone Therapy    Letrozole 2 5 mg daily   Discontinued due to adverse effects (reported tongue swelling, throat pain upset stomach, and muscle and joint pain)  - Dr Juan Mckeon     1/28/2019 - 3/8/2019 Radiation    Plan ID Energy Fractions Dose per Fraction (cGy) Dose Correction (cGy) Total Dose Delivered (cGy) Elapsed Days   BH L CW 6X 13 / 13 200 0 2,600 32   BH L CW Lm 6X 12 / 12 200 0 2,400 30   BH L CW Boost 6E 5 / 5 200 0 1,000 4   BH L PAB 10X 25 / 25 33 0 825 32   BH L Sclav 10X 25 / 25 200 0 5,000 28      - Dr Bo Yee     6/25/2019 Surgery    Left breast tissue expander removal  open capsulectomy,gel implant insertion     Right mastopexy for symmetry         Past Medical History:   Diagnosis Date    Breast cancer ([facility] [address] )     DJD (degenerative joint disease)     History of blood clots     Osteoarthritis     left hip    Osteoarthritis     Pain      Past Surgical History:   Procedure Laterality Date    BREAST IMPLANT Left 06/25/2019    Left breast tissue expander removal  open capsulectomy,gel implant insertion  Dr Margarita Jones EXTRACTION, BILATERAL Bilateral 7/21/21 8/4/21    MASTOPEXY Right 06/25/2019    Right mastopexy for symmetry    OVARY SURGERY      TOTAL HIP ARTHROPLASTY      US GUIDANCE BREAST BIOPSY LEFT EACH ADDITIONAL Left 9/19/2018    US GUIDANCE BREAST BIOPSY LEFT EACH ADDITIONAL Left 9/19/2018    US GUIDED BREAST BIOPSY LEFT COMPLETE Left 9/19/2018       Social History   Social History     Substance and Sexual Activity   Alcohol Use No     Social History     Substance and Sexual Activity   Drug Use No     Social History     Tobacco Use   Smoking Status Never Smoker   Smokeless Tobacco Never Used     Meds/Allergies     Current Outpatient Medications:     ALPRAZolam (XANAX) 1 mg tablet, Take 1 tablet (1 mg total) by mouth 2 (two) times a day Reviewed PDMP   Last filled on 6/3/21, Disp: 60 tablet, Rfl: 0    aspirin 325 mg tablet, Take 325 mg by mouth as needed for mild pain, Disp: , Rfl:     Calcium-Phosphorus-Vitamin D (CALCIUM GUMMIES PO), Take 1 capsule by mouth 3 (three) times a day, Disp: , Rfl:     glycerin adult 2 g suppository, Insert 1 suppository into the rectum as needed for constipation, Disp: , Rfl:     HYDROmorphone (DILAUDID) 4 mg tablet, take 1 tablet (4MG)  by oral route  every 4 - 6 hours as needed, Disp: , Rfl:     lidocaine (XYLOCAINE) 5 % ointment, Apply topically as needed for mild pain, Disp: 35 44 g, Rfl: 0    Morphine Sulfate ER 30 MG TBEA, Take 1 tablet by mouth 3 (three) times a day, Disp: , Rfl:     multivitamin (THERAGRAN) TABS, Take 1 tablet by mouth daily, Disp: , Rfl:     naloxone (NARCAN) 4 mg/0 1 mL nasal spray, Administer 1 spray into a nostril  If no response after 2-3 minutes, give another dose in the other nostril using a new spray , Disp: 1 each, Rfl: 1    Cholecalciferol (VITAMIN D3 PO), Take 1 capsule by mouth daily (Patient not taking: Reported on 9/2/2021), Disp: , Rfl:   Allergies   Allergen Reactions    Triamcinolone Rash and Blisters    Acetaminophen Other (See Comments)     Other reaction(s): temp paralysis, red flushing ski  paralysis    Clindamycin GI Intolerance and Other (See Comments)    Corticosteroids      Oral and IV    Ibuprofen Other (See Comments)     Severe headache    Lyrica [Pregabalin]     Penicillins Other (See Comments)    Prochlorperazine Other (See Comments)    Gabapentin Rash and Other (See Comments)     Difficultly breathing     Review of Systems  Constitutional: Positive for fatigue  Negative for appetite change and fever  HENT: Negative  Eyes: Negative  Negative for visual disturbance (cataracts)  Recent cataract surgery bilaterally   Respiratory: Negative  Negative for cough and shortness of breath  Cardiovascular: Negative  Negative for chest pain  Gastrointestinal: Negative  Negative for abdominal pain  Endocrine: Negative  Genitourinary: Negative  Musculoskeletal: Positive for arthralgias, back pain and gait problem (ambulates with walker)  Tenderness left axilla  Wears back brace for scoliosis   Skin: Negative  Reports redness/blisters on left side of back that come and go   Allergic/Immunologic: Negative  Negative for environmental allergies  Neurological: Positive for numbness (neuropathy right foot, left hand, slight in left foot/toes)  Negative for dizziness and headaches  Hematological: Negative  Psychiatric/Behavioral: Positive for sleep disturbance   The patient is nervous/anxious        OBJECTIVE:   /90 (BP Location: Right arm, Patient Position: Sitting)   Pulse 71   Temp [place] ) 96 8 °F (36 °C) (Temporal)   Resp 16   Ht 5' 3" (1 6 m)   Wt 57 2 kg (126 lb 1 7 oz)   SpO2 98%   BMI 22 34 kg/m²   Pain Assessment:  4  ECOG/Zubrod/WHO: 1 - Symptomatic but completely ambulatory    Physical Exam   Constitutional: She is oriented to person, place, and time  She appears well-developed and well-nourished  No distress  HENT:   Head: Normocephalic and atraumatic  Mouth/Throat: No oropharyngeal exudate  Eyes: Pupils are equal, round, and reactive to light  Conjunctivae and EOM are normal  No scleral icterus  Neck: Normal range of motion  Neck supple  No tracheal deviation present  No thyromegaly present  Cardiovascular: Normal rate, regular rhythm and normal heart sounds  Pulmonary/Chest: Effort normal and breath sounds normal  No respiratory distress  She has no wheezes  She has no rales  She exhibits no mass and no tenderness  Right breast exhibits no inverted nipple, no mass, no nipple discharge, no skin change and no tenderness    There is a well-healed left mastectomy incision with underlying gel implant intact and no evidence of any nodules or masses  Bonnetta Kins are some post radiation changes and minimal hyperpigmentation without any skin breakdown  She has some stable telangiectasias in the left inframammary fold    Abdominal: Soft  Bowel sounds are normal  She exhibits no distension and no mass  There is no tenderness  Musculoskeletal: Normal range of motion  She exhibits no edema or tenderness  Lymphadenopathy:     She has no cervical adenopathy      She has no axillary adenopathy         Right: No supraclavicular adenopathy present         Left: No supraclavicular adenopathy present  Neurological: She is alert and oriented to person, place, and time  No cranial nerve deficit  Coordination normal    Skin: Skin is warm and dry  No rash noted  She is not diaphoretic  No erythema  No pallor  Psychiatric: She has a normal mood and affect   Her behavior is normal  Judgment and thought content normal    Nursing note and vitals reviewed  RESULTS    Lab Results:   Recent Results (from the past 672 hour(s))   Hepatitis C RNA, quantitative, PCR    Collection Time: 08/26/21 10:25 AM   Result Value Ref Range    HCV PCR Quantitative See Final Results IU/mL    Test Information Comment    Hepatitis C antibody    Collection Time: 08/26/21 10:25 AM   Result Value Ref Range    Hepatitis C Ab High Reactive (A) Non-reactive   HCV RNA-REFLEX    Collection Time: 08/26/21 10:25 AM   Result Value Ref Range    Hepatitis C RNA-PCR [id number] IU/mL    HCV Quantitative Log 7 021 log10 IU/mL   HCV Antibody Reflx to Quant PCR    Collection Time: 08/26/21  4:46 PM   Result Value Ref Range    HEP C AB >11 0 (H) 0 0 - 0 9 s/co ratio   HCV RT-PCR    Collection Time: 08/26/21  4:46 PM   Result Value Ref Range    Hepatitis C Quantitation [id number] IU/mL    HCV Quantitative Log 6 985 log10 IU/mL    Test Information Comment     INTERPRETATION Comment      Imaging Studies:No results found  Assessment/Plan:  No orders of the defined types were placed in this encounter  Sushil Cea is a 72y o  year old female with a history of an anatomic stage IIIA (pathologic prognostic stage group; IB) grade 1 invasive lobular carcinoma left breast status post nipple sparing mastectomy and sentinel lymph node biopsy with axillary lymph node dissection   There was a close margin in the upper outer and lower outer soft tissue   Due to initial size of her tumor being estimated at greater than 5 cm along with tumor being seen within 1 mm of the soft tissue margins as well as 3 lymph nodes out of 14 lymph nodes being positive for metastatic disease with lymphatic and vascular invasion, she is at increased risk for locally recurrent disease   We recommended postmastectomy radiation therapy to the reconstructed left chest wall, axillary, and supraclavicular regions   She was seen by Dr Teresa Elmore and declined postoperative adjuvant chemotherapy due to the potential side effects   She did consent to adjuvant hormonal therapy and was given prescription for letrozole 2 5 mg daily   She discontinued letrozole the end of January 2019 due to adverse effects   She completed radiation therapy on March 8, 2019       She returns today for follow up post radiation therapy   She continues to have some left reconstructed breast as well as underlying left chest wall/rib discomfort that extends into the left upper arm at times which is improving   She has no lymphedema    She will continue to apply moisturizer and massage the reconstructed left chest wall on a daily basis   She had removal of her tissue expander with placement of permanent gel implant in June 2019 along with right breast mastopexy for symmetry  She was seen by medical oncology April 3, 2019 and declined trying any other aromatase inhibitors    She continues to decline any aromatase inhibitors   Her chest CT from October 14, 2019 revealed a 6 mm calcified granuloma in the left lower lobe accounting for the finding noted on her chest x-ray  October 2, 2019  Jess Rubinstein was evidence of left mastectomy with axillary lymph node dissection and placement of implant as well as some left apical scarring  Her PET-CT study from October 24, 2018 also revealed a left lower lobe calcified granuloma as well as chest x-ray from September 27, 2018   Results were discussed previously and today with the patient    The granuloma remained stable from 2018 to 2019   Due to the coronavirus outbreak, she does not wish to have a repeat chest CT at this time   She will return here for follow-up in 12 months        Yarelis Holt MD  9/2/2021,2:18 PM    Portions of the record may have been created with voice recognition software   Occasional wrong word or "sound a like" substitutions may have occurred due to the inherent limitations of voice recognition software   Read the chart carefully and recognize, using context, where substitutions have occurred

## 2021-09-08 NOTE — TELEPHONE ENCOUNTER
Called patient to assist scheduling DXA Scan, per patient request did not want to schedule scan now, but preferred to schedule closer to follow up appointment time w/ PCP  Patient requested SH and 11am or 2pm  Scheduled 11/5 @ 11am  Per patient request order w/ appointment details mailed to patient and to be confirmed from patient through [facility] [phone] [place]

## 2021-09-16 ENCOUNTER — TELEPHONE (OUTPATIENT)
Dept: RADIATION ONCOLOGY | Facility: CLINIC | Age: 66
End: 2021-09-16

## 2021-09-16 NOTE — TELEPHONE ENCOUNTER
Return call to pt post email to Dr Kellee Bradshaw  Pt noting "swelling in her upper arm", "want a sleeve and pump for lymphedema"  Call to pt-Ambulatory referral to PT suggested  Mailed copy of the order to pt  She is in agreement w/PT referral for eval and tx

## 2021-09-29 ENCOUNTER — EVALUATION (OUTPATIENT)
Dept: PHYSICAL THERAPY | Facility: CLINIC | Age: 66
End: 2021-09-29
Payer: COMMERCIAL

## 2021-09-29 DIAGNOSIS — I89.0 LYMPHEDEMA: ICD-10-CM

## 2021-09-29 DIAGNOSIS — C50.812 MALIGNANT NEOPLASM OF OVERLAPPING SITES OF LEFT FEMALE BREAST, UNSPECIFIED ESTROGEN RECEPTOR STATUS (HCC): Primary | ICD-10-CM

## 2021-09-29 PROCEDURE — 97163 PT EVAL HIGH COMPLEX 45 MIN: CPT | Performed by: PHYSICAL THERAPIST

## 2021-09-29 NOTE — PROGRESS NOTES
PT Evaluation     Today's date: 2021  Patient name: Bola Mas  : 1955  MRN: [id number]  Referring provider: Jade Rivera MD  Dx:   Encounter Diagnosis     ICD-10-CM    1  Malignant neoplasm of overlapping sites of left female breast, unspecified estrogen receptor status ([facility] [address] )  C50 812    2  Lymphedema  I89 0                   Assessment  Assessment details: Pt presents this day for assessment of B UE to consider lymphedema given pt's PMHx including L breast CA w/ surgical intervention including mastectomy, ALND, and reconstruction w/ last intervention performed 19  Pt also underwent radiation tx d/c in   She is able to maintain jewelry L hand/wrist w/o c/o but slight increase in girth measurements are seen L>R forearm, this may be attributable to forearm musculature vs edema  Upper arms are relatively equal   Of primary concern is patients report of proximal heaviness sensation L upper arm and more significantly PN in L axilla and anterior chest wall, worsened w/ self palpation and also noted this day as a sharp PN, most specific along 2nd thru 4th ribs L side within axillary region and also in pectoral region  L shldr ROM is WFL's, strength fair to good  Recommend 1 mo f/u for re-measurement but also strongly advised pt f/u w/ her oncologic team to further investigate PN c/o  Understanding of Dx/Px/POC: good   Prognosis: good    Goals  ST  1 mo f/u for RA  2  F/u w/ surgical oncology within 1 mo  LT  Proficient in self MLD within 6 wks  2  No sig L UE edema increase within 6 wks  3   Pt to obtain Lymphedema compression sleeve L UE, fitted size small long - lymphedivas within 6 wks    Plan  Patient would benefit from: skilled physical therapy  Planned therapy interventions: massage, manual therapy, compression, patient education and home exercise program  Frequency: 1x month  Duration in visits: 2  Duration in weeks: 6  Plan of Care beginning date: 2021  Plan of Care expiration date: 11/10/2021  Treatment plan discussed with: patient        Subjective Evaluation    History of Present Illness  Mechanism of injury: Pt presents expressing concern w/ a heaviness sensation of L UE and a general tightness of L shldr complex spanning from L breast region citing discomfort in ribs and axillary region some of which she attributes to the relationship of her L breast implant and shift of her ribs related to chronic scoliosis of spine  Pt has PMHx sig for L breast CA w/ mastectomy, ALND, and reconstruction, last surgical intervention 19  Pt has trialled several compression garments but reports neg response actually creating more edema in L hand at one point, diminished once garment removed  Pt inquires regarding benefits of compression pump  Recurrent probem    Quality of life: good    Pain  Current pain ratin  At best pain rating: 3  At worst pain ratin  Location: L axilla to anterior chest  Quality: sharp, tight, dull ache and discomfort    Treatments  No previous or current treatments  Patient Goals  Patient goals for therapy: decreased edema, decreased pain and independence with ADLs/IADLs          Objective     Tenderness     Additional Tenderness Details  Sharp ttp in L axilla + anterior chest wall along pectoralis musculature/fascia w/ PN along 2nd-4th ribs, specific but radiating as palpation applied       Active Range of Motion   Left Shoulder   Flexion: 172 degrees   Abduction: 168 degrees   External rotation 45°: 80 degrees   Internal rotation BTB: T12     Right Shoulder   Normal active range of motion    Passive Range of Motion   Left Shoulder   Flexion: 178 degrees   Abduction: 178 degrees   External rotation 45°: 90 degrees     Right Shoulder   Normal passive range of motion         UPPER EXTREMITY LEFT CALCULATIONS      Most Recent Value   Volume UE (mL)  189   Difference from last visit (mL)   -   Difference from first visit (mL)   -9697 UPPER EXTREMITY RIGHT CALCULATIONS      Most Recent Value   Volume UE (mL)  1974   Difference from last visit (mL)   -1974   Difference from first visit (mL)   -1974             Precautions: Hx of L breast CA w/ mastectomy, ALND      Manuals                                                                 Neuro Re-Ed                                                                                                        Ther Ex                                                                                                                     Ther Activity                                       Gait Training                                       Modalities

## 2021-10-03 DIAGNOSIS — F43.22 ADJUSTMENT DISORDER WITH ANXIETY: ICD-10-CM

## 2021-10-05 RX ORDER — LORAZEPAM 1 MG/1
1 TABLET ORAL 2 TIMES DAILY
Qty: 60 TABLET | Refills: 0 | Status: SHIPPED | OUTPATIENT
Start: 2021-10-05 | End: 2021-10-31 | Stop reason: SDUPTHER

## 2021-10-31 DIAGNOSIS — F43.22 ADJUSTMENT DISORDER WITH ANXIETY: ICD-10-CM

## 2021-11-05 RX ORDER — LORAZEPAM 1 MG/1
1 TABLET ORAL 2 TIMES DAILY
Qty: 60 TABLET | Refills: 0 | Status: SHIPPED | OUTPATIENT
Start: 2021-11-05 | End: 2021-12-05 | Stop reason: SDUPTHER

## 2021-11-09 ENCOUNTER — HOSPITAL ENCOUNTER (OUTPATIENT)
Dept: BONE DENSITY | Facility: CLINIC | Age: 66
Discharge: HOME | End: 2021-11-09
Payer: COMMERCIAL

## 2021-11-09 DIAGNOSIS — M85.88 OSTEOPENIA OF SPINE: ICD-10-CM

## 2021-11-09 DIAGNOSIS — Z13.820 SCREENING FOR OSTEOPOROSIS: ICD-10-CM

## 2021-11-09 PROCEDURE — 77080 DXA BONE DENSITY AXIAL: CPT

## 2021-11-10 ENCOUNTER — OFFICE VISIT (OUTPATIENT)
Dept: FAMILY MEDICINE CLINIC | Facility: CLINIC | Age: 66
End: 2021-11-10

## 2021-11-10 VITALS
HEIGHT: 63 IN | HEART RATE: 90 BPM | WEIGHT: 122 LBS | RESPIRATION RATE: 18 BRPM | DIASTOLIC BLOOD PRESSURE: 72 MMHG | TEMPERATURE: 98.7 F | SYSTOLIC BLOOD PRESSURE: 118 MMHG | BODY MASS INDEX: 21.62 KG/M2 | OXYGEN SATURATION: 97 %

## 2021-11-10 DIAGNOSIS — T85.9XXA COMPLICATION OF BREAST IMPLANT: ICD-10-CM

## 2021-11-10 DIAGNOSIS — M85.88 OSTEOPENIA OF SPINE: ICD-10-CM

## 2021-11-10 DIAGNOSIS — Z85.3 HISTORY OF CANCER OF LEFT BREAST: ICD-10-CM

## 2021-11-10 DIAGNOSIS — R91.1 LUNG NODULE: Primary | ICD-10-CM

## 2021-11-10 DIAGNOSIS — F43.22 ADJUSTMENT DISORDER WITH ANXIETY: ICD-10-CM

## 2021-11-10 PROBLEM — I10 HIGH BLOOD PRESSURE DETERMINED BY EXAMINATION: Status: RESOLVED | Noted: 2021-03-31 | Resolved: 2021-11-10

## 2021-11-10 PROCEDURE — 99214 OFFICE O/P EST MOD 30 MIN: CPT | Performed by: FAMILY MEDICINE

## 2022-01-03 DIAGNOSIS — F43.22 ADJUSTMENT DISORDER WITH ANXIETY: ICD-10-CM

## 2022-01-03 RX ORDER — LORAZEPAM 1 MG/1
1 TABLET ORAL 2 TIMES DAILY
Qty: 60 TABLET | Refills: 0 | Status: SHIPPED | OUTPATIENT
Start: 2022-01-03 | End: 2022-02-04 | Stop reason: SDUPTHER

## 2022-02-14 ENCOUNTER — TELEPHONE (OUTPATIENT)
Dept: FAMILY MEDICINE CLINIC | Facility: CLINIC | Age: 67
End: 2022-02-14

## 2022-02-14 NOTE — TELEPHONE ENCOUNTER
PATIENT CALLED OFFICE STATING SHE NEEDS PAIN MEDICATION   SHE IS STATING THAT HER PAIN MANAGEMENT PROVIDER IS NOT AVAILABLE (QUIT) AND SHE NEEDS A ONE TIME REFILL ON HER MEDICATION MEANWHILE SHE IS ABLE TO FIND ANOTHER PROVIDER PT HAS AN APPT SCHEDULED FOR 03/02/2022     PLEASE ADVISE

## 2022-03-02 ENCOUNTER — OFFICE VISIT (OUTPATIENT)
Dept: FAMILY MEDICINE CLINIC | Facility: CLINIC | Age: 67
End: 2022-03-02

## 2022-03-02 VITALS
TEMPERATURE: 97.6 F | HEIGHT: 63 IN | SYSTOLIC BLOOD PRESSURE: 116 MMHG | DIASTOLIC BLOOD PRESSURE: 74 MMHG | OXYGEN SATURATION: 98 % | RESPIRATION RATE: 18 BRPM | HEART RATE: 75 BPM | BODY MASS INDEX: 21.79 KG/M2 | WEIGHT: 123 LBS

## 2022-03-02 DIAGNOSIS — R76.8 HEPATITIS C ANTIBODY POSITIVE IN BLOOD: ICD-10-CM

## 2022-03-02 DIAGNOSIS — F11.20 CONTINUOUS OPIOID DEPENDENCE (HCC): ICD-10-CM

## 2022-03-02 DIAGNOSIS — F43.22 ADJUSTMENT DISORDER WITH ANXIETY: ICD-10-CM

## 2022-03-02 DIAGNOSIS — Z17.0 MALIGNANT NEOPLASM OF LOWER-OUTER QUADRANT OF LEFT BREAST OF FEMALE, ESTROGEN RECEPTOR POSITIVE (HCC): ICD-10-CM

## 2022-03-02 DIAGNOSIS — R91.1 LUNG NODULE: ICD-10-CM

## 2022-03-02 DIAGNOSIS — R79.9 ABNORMAL FINDING OF BLOOD CHEMISTRY, UNSPECIFIED: ICD-10-CM

## 2022-03-02 DIAGNOSIS — C50.512 MALIGNANT NEOPLASM OF LOWER-OUTER QUADRANT OF LEFT BREAST OF FEMALE, ESTROGEN RECEPTOR POSITIVE (HCC): ICD-10-CM

## 2022-03-02 DIAGNOSIS — I82.5Z1 CHRONIC DEEP VEIN THROMBOSIS (DVT) OF DISTAL VEIN OF RIGHT LOWER EXTREMITY (HCC): ICD-10-CM

## 2022-03-02 DIAGNOSIS — M85.88 OSTEOPENIA OF SPINE: ICD-10-CM

## 2022-03-02 DIAGNOSIS — R60.0 LOCALIZED EDEMA: Primary | ICD-10-CM

## 2022-03-02 DIAGNOSIS — M41.25 OTHER IDIOPATHIC SCOLIOSIS, THORACOLUMBAR REGION: ICD-10-CM

## 2022-03-02 PROBLEM — Z79.891 CHRONICALLY ON OPIATE THERAPY: Status: ACTIVE | Noted: 2022-02-16

## 2022-03-02 PROCEDURE — 99214 OFFICE O/P EST MOD 30 MIN: CPT | Performed by: FAMILY MEDICINE

## 2022-03-02 PROCEDURE — 3078F DIAST BP <80 MM HG: CPT | Performed by: FAMILY MEDICINE

## 2022-03-02 PROCEDURE — 3074F SYST BP LT 130 MM HG: CPT | Performed by: FAMILY MEDICINE

## 2022-03-02 RX ORDER — LORAZEPAM 1 MG/1
1 TABLET ORAL 2 TIMES DAILY
Qty: 60 TABLET | Refills: 1 | Status: SHIPPED | OUTPATIENT
Start: 2022-03-02 | End: 2022-08-09 | Stop reason: ALTCHOICE

## 2022-03-02 RX ORDER — LIDOCAINE 50 MG/G
OINTMENT TOPICAL AS NEEDED
Qty: 35.44 G | Refills: 0 | Status: SHIPPED | OUTPATIENT
Start: 2022-03-02

## 2022-03-02 NOTE — PROGRESS NOTES
Assessment/Plan:    Adjustment disorder with anxiety  Since diagnosis of breast cancer, has been on xanax  Now worried about COVID-pandemic especially with her condition  Continue lorazepam 1 mg bid      Osteopenia of spine  dexa scan on 11/9/21 - osteopenia  Continue calcium +vitamin d    Continuous opioid dependence ([address] )  Gets hydromorphone and morphine from Pain Management    Other idiopathic scoliosis, thoracolumbar region  Last saw physiatrist in December 2020  Continue pain medications   Continue PT home exercises    Lung nodule  -Patient was supposed to have had repeat CT chest in 3 to 6 months from last CT which was in October 2019 (6 mm calcified granuloma in LLL)  -Patient would rather talk to her radiation oncologist regarding the need to repeat or not - she is not ready to go for CT at this time    Malignant neoplasm of left breast in female, estrogen receptor positive [facility])  Follow-up oncologist as needed  Follow-up breast surgeon as needed      Complication of breast implant  S/p breast implant removal in January 2022 - site reconstructed   F/u plastic surgeon       Diagnoses and all orders for this visit:    Left arm swelling    Continuous opioid dependence ([address] )    Chronic deep vein thrombosis (DVT) of distal vein of right lower extremity ([address] )    Malignant neoplasm of lower-outer quadrant of left breast of female, estrogen receptor positive ([address] )    Localized edema   -     VAS upper limb venous duplex scan, unilateral/limited; Future    Adjustment disorder with anxiety  -     LORazepam (ATIVAN) 1 mg tablet; Take 1 tablet (1 mg total) by mouth 2 (two) times a day    Other idiopathic scoliosis, thoracolumbar region  -     lidocaine (XYLOCAINE) 5 % ointment; Apply topically as needed for mild pain    Hepatitis C antibody positive in blood  -     Hepatitis C antibody; Future  -     Hepatitis C Virus (HCV) RNA, Qualitative, CHRISTIANO With Reflex to Quantitative PCR;  Future  -     Comprehensive metabolic panel; Future  -     CBC and differential; Future    Abnormal finding of blood chemistry, unspecified   -     Lipid panel; Future    Osteopenia of spine    Lung nodule    Other orders  -     Glycerin-Polysorbate 80 (REFRESH DRY EYE THERAPY OP); Apply 1 drop to eye          Subjective:      Patient ID: Dominic Moreno is a 77 y o  female  Has been in extreme pain due to lymphedema in her left arm  Going to PT now  Patient had left breast implant removed in January 2022  Since then lymphedema got worse  After experiencing breast implant illness, females have gotten an autoimmune disorder  Patient was tested positive for hepatitis c and not sure how  Patient wants to get retested  [facility] gave her a problem filling her pain meds since they bought out [employer]  They gave her 1/2 of the quantity of hydromorphone 4 mg  Had been taking it four times per day but now it had been prescribed only twice per day  Filled it on 2/17/22  Has appointment with [facility] in April 2022  The following portions of the patient's history were reviewed and updated as appropriate: allergies, current medications, past family history, past medical history, past social history, past surgical history and problem list     Review of Systems   Constitutional: Negative for chills, fatigue, fever and unexpected weight change  HENT: Negative for congestion, ear pain, rhinorrhea and sore throat  Eyes: Negative for pain and visual disturbance  Respiratory: Negative for cough, chest tightness and shortness of breath  Cardiovascular: Negative for chest pain, palpitations and leg swelling  Gastrointestinal: Negative for abdominal pain, constipation, diarrhea, nausea and vomiting  Genitourinary: Negative for difficulty urinating, dysuria and urgency  Musculoskeletal: Negative for arthralgias and back pain  Skin: Negative for rash     Neurological: Negative for dizziness, numbness and headaches  Psychiatric/Behavioral: Negative for behavioral problems and suicidal ideas  The patient is not nervous/anxious  Objective:      /74 (BP Location: Right arm, Patient Position: Sitting, Cuff Size: Standard)   Pulse 75   Temp 97 6 °F (36 4 °C) (Temporal)   Resp 18   Ht 5' 3" (1 6 m)   Wt 55 8 kg (123 lb)   SpO2 98%   BMI 21 79 kg/m²          Physical Exam  Constitutional:       Appearance: She is well-developed  HENT:      Head: Normocephalic and atraumatic  Right Ear: External ear normal       Left Ear: External ear normal       Nose: Nose normal    Eyes:      Conjunctiva/sclera: Conjunctivae normal       Pupils: Pupils are equal, round, and reactive to light  Cardiovascular:      Rate and Rhythm: Normal rate and regular rhythm  Heart sounds: Normal heart sounds  Pulmonary:      Effort: Pulmonary effort is normal       Breath sounds: Normal breath sounds  Abdominal:      General: Bowel sounds are normal       Palpations: Abdomen is soft  Musculoskeletal:         General: Normal range of motion  Cervical back: Normal range of motion and neck supple  Skin:     General: Skin is warm  Neurological:      Mental Status: She is alert and oriented to person, place, and time     Psychiatric:         Behavior: Behavior normal

## 2022-03-02 NOTE — ASSESSMENT & PLAN NOTE
Since diagnosis of breast cancer, has been on xanax  Now worried about COVID-pandemic especially with her condition  Continue lorazepam 1 mg bid

## 2022-03-03 ENCOUNTER — HOSPITAL ENCOUNTER (OUTPATIENT)
Dept: NON INVASIVE DIAGNOSTICS | Facility: HOSPITAL | Age: 67
Discharge: HOME | End: 2022-03-03
Payer: MEDICARE

## 2022-03-03 DIAGNOSIS — R60.0 LOCALIZED EDEMA: ICD-10-CM

## 2022-03-03 PROCEDURE — 93971 EXTREMITY STUDY: CPT | Performed by: SURGERY

## 2022-03-03 PROCEDURE — 93971 EXTREMITY STUDY: CPT

## 2022-03-04 PROBLEM — I89.0 LYMPHEDEMA OF LEFT ARM: Status: ACTIVE | Noted: 2022-03-04

## 2022-03-04 PROBLEM — I82.5Z1 CHRONIC DEEP VEIN THROMBOSIS (DVT) OF DISTAL VEIN OF RIGHT LOWER EXTREMITY (HCC): Status: RESOLVED | Noted: 2018-09-04 | Resolved: 2022-03-04

## 2022-03-04 NOTE — ASSESSMENT & PLAN NOTE
-Patient was supposed to have had repeat CT chest in 3 to 6 months from last CT which was in October 2019 (6 mm calcified granuloma in LLL)  -Patient would rather talk to her radiation oncologist regarding the need to repeat or not - she is not ready to go for CT at this time

## 2022-04-08 ENCOUNTER — PATIENT MESSAGE (OUTPATIENT)
Dept: FAMILY MEDICINE CLINIC | Facility: CLINIC | Age: 67
End: 2022-04-08

## 2022-04-15 NOTE — TELEPHONE ENCOUNTER
Spoke to patient who told me she did go to dentist and he gave her antibiotics  I did tell her according to January 2015 ADA guidelines, patients with prosthetic joint implants do not require prophylactic antibiotics prior to dental procedures  I recommended she speak to oncologist regarding lymphedema and dental procedure

## 2022-07-19 ENCOUNTER — OFFICE VISIT (OUTPATIENT)
Dept: FAMILY MEDICINE CLINIC | Facility: CLINIC | Age: 67
End: 2022-07-19

## 2022-07-19 VITALS
SYSTOLIC BLOOD PRESSURE: 132 MMHG | HEIGHT: 63 IN | WEIGHT: 120 LBS | HEART RATE: 65 BPM | DIASTOLIC BLOOD PRESSURE: 84 MMHG | OXYGEN SATURATION: 98 % | BODY MASS INDEX: 21.26 KG/M2 | TEMPERATURE: 99.4 F | RESPIRATION RATE: 18 BRPM

## 2022-07-19 DIAGNOSIS — Z12.11 ENCOUNTER FOR SCREENING COLONOSCOPY: ICD-10-CM

## 2022-07-19 DIAGNOSIS — F43.22 ADJUSTMENT DISORDER WITH ANXIETY: ICD-10-CM

## 2022-07-19 DIAGNOSIS — Z00.00 MEDICARE ANNUAL WELLNESS VISIT, SUBSEQUENT: Primary | ICD-10-CM

## 2022-07-19 PROCEDURE — G0439 PPPS, SUBSEQ VISIT: HCPCS | Performed by: FAMILY MEDICINE

## 2022-07-19 PROCEDURE — 1123F ACP DISCUSS/DSCN MKR DOCD: CPT | Performed by: FAMILY MEDICINE

## 2022-07-19 PROCEDURE — 3079F DIAST BP 80-89 MM HG: CPT | Performed by: FAMILY MEDICINE

## 2022-07-19 PROCEDURE — 3075F SYST BP GE 130 - 139MM HG: CPT | Performed by: FAMILY MEDICINE

## 2022-07-19 NOTE — PROGRESS NOTES
Assessment and Plan:   Adjustment disorder with anxiety  · Was previously on Xanax but since discontinued per patient by  Pain Medicine  She discontinued it over a week and states that she has had difficulty sleeping and day to day activities while off it  · Advised to discuss with Dr Jasvir Johnson at next appointment to decide if restarting is a possibility  Healthcare maintenance  · Cologuard ordered  · Mammogram UTD and screened frequent as managed   · Discussed Pneumococcal vaccination  · Did not have time to discuss Cervical Cancer Screening    Problem List Items Addressed This Visit        Other    Adjustment disorder with anxiety     · Was previously on Xanax but since discontinued per patient by  Pain Medicine  She discontinued it over a week and states that she has had difficulty sleeping and day to day activities while off it  · Advised to discuss with Dr Jasvir Johnson at next appointment to decide if restarting is a possibility  Other Visit Diagnoses     Medicare annual wellness visit, subsequent    -  Primary    Encounter for screening colonoscopy        Relevant Orders    Cologuard          Depression Screening and Follow-up Plan: Patient was screened for depression during today's encounter  They screened negative with a PHQ-2 score of 0  Falls Plan of Care: balance, strength, and gait training instructions were provided  Recommended assistive device to help with gait and balance  Medications that increase falls were reviewed  Assessed feet and footwear  Preventive health issues were discussed with patient, and age appropriate screening tests were ordered as noted in patient's After Visit Summary  Personalized health advice and appropriate referrals for health education or preventive services given if needed, as noted in patient's After Visit Summary       History of Present Illness:     Patient presents for a Medicare Wellness Visit    HPI   Patient Care Team:  Lukas Villagomez DO as PCP - General (Family Medicine)  [facility] as PCP - [address] [place] [address],[address] [facility] ([facility])  Davie Ramírez MD (Radiation Oncology)  Henrry Gleason MD (Breast Surgery)  Terrance Phillips DO (Pain Medicine)  Radha Gastelum MD (Orthopedic Surgery)     Review of Systems:     Review of Systems   Constitutional: Negative for chills, fatigue and fever  HENT: Negative for sore throat  Respiratory: Negative for cough, chest tightness and shortness of breath  Cardiovascular: Negative for chest pain and leg swelling  Gastrointestinal: Negative for constipation, diarrhea, nausea and vomiting  Endocrine: Negative for polydipsia, polyphagia and polyuria  Genitourinary: Negative for dysuria  Musculoskeletal: Negative for arthralgias  Skin: Negative for rash  Neurological: Negative for dizziness, light-headedness and headaches  Psychiatric/Behavioral: Negative for agitation and behavioral problems  The patient is nervous/anxious         Problem List:     Patient Active Problem List   Diagnosis    History of total hip arthroplasty    Malignant neoplasm of left breast in female, estrogen receptor positive ([address] )    Osteopenia of spine    Other idiopathic scoliosis, thoracolumbar region    History of cancer of left breast    Lung nodule    Adjustment disorder with anxiety    Degenerative lumbar spinal stenosis    Piriformis syndrome    Complication of breast implant    Continuous opioid dependence ([address] )    Chronically on opiate therapy    Lymphedema of left arm    Healthcare maintenance      Past Medical and Surgical History:     Past Medical History:   Diagnosis Date    Breast cancer ([address] )     DJD (degenerative joint disease)     History of blood clots     Osteoarthritis     left hip    Osteoarthritis     Pain      Past Surgical History:   Procedure Laterality Date    BREAST IMPLANT Left 06/25/2019    Left breast tissue expander removal  open capsulectomy,gel implant insertion  Dr Marilu Baptiste, BILATERAL Bilateral 7/21/21 8/4/21    MASTOPEXY Right 06/25/2019    Right mastopexy for symmetry    OVARY SURGERY      TOTAL HIP ARTHROPLASTY      US GUIDANCE BREAST BIOPSY LEFT EACH ADDITIONAL Left 9/19/2018    US GUIDANCE BREAST BIOPSY LEFT EACH ADDITIONAL Left 9/19/2018    US GUIDED BREAST BIOPSY LEFT COMPLETE Left 9/19/2018      Family History:     Family History   Problem Relation Age of Onset   Nikos Cornell Breast cancer Mother 43    Lymphoma Mother     Breast cancer Maternal Aunt       Social History:     Social History     Socioeconomic History    Marital status:      Spouse name: None    Number of children: None    Years of education: None    Highest education level: None   Occupational History    None   Tobacco Use    Smoking status: Never Smoker    Smokeless tobacco: Never Used   Vaping Use    Vaping Use: Never used   Substance and Sexual Activity    Alcohol use: No    Drug use: No    Sexual activity: None   Other Topics Concern    None   Social History Narrative    None     Social Determinants of Health     Financial Resource Strain: Not on file   Food Insecurity: No Food Insecurity    Worried About Running Out of Food in the Last Year: Never true    Kobi of Food in the Last Year: Never true   Transportation Needs: No Transportation Needs    Lack of Transportation (Medical): No    Lack of Transportation (Non-Medical):  No   Physical Activity: Not on file   Stress: Not on file   Social Connections: Not on file   Intimate Partner Violence: Not on file   Housing Stability: Low Risk     Unable to Pay for Housing in the Last Year: No    Number of Places Lived in the Last Year: 1    Unstable Housing in the Last Year: No      Medications and Allergies:     Current Outpatient Medications   Medication Sig Dispense Refill    aspirin 325 mg tablet Take 325 mg by mouth as needed for mild pain      Calcium-Phosphorus-Vitamin D (CALCIUM GUMMIES PO) Take 1 capsule by mouth 3 (three) times a day      Cholecalciferol (VITAMIN D3 PO) Take 1 capsule by mouth daily      glycerin adult 2 g suppository Insert 1 suppository into the rectum as needed for constipation      Glycerin-Polysorbate 80 (REFRESH DRY EYE THERAPY OP) Apply 1 drop to eye      HYDROmorphone (DILAUDID) 4 mg tablet take 1 tablet (4MG)  by oral route  every 4 - 6 hours as needed      lidocaine (XYLOCAINE) 5 % ointment Apply topically as needed for mild pain 35 44 g 0    LORazepam (ATIVAN) 1 mg tablet Take 1 tablet (1 mg total) by mouth 2 (two) times a day 60 tablet 1    Morphine Sulfate ER 30 MG TBEA Take 1 tablet by mouth 3 (three) times a day      multivitamin (THERAGRAN) TABS Take 1 tablet by mouth daily      naloxone (NARCAN) 4 mg/0 1 mL nasal spray Administer 1 spray into a nostril  If no response after 2-3 minutes, give another dose in the other nostril using a new spray  1 each 1     No current facility-administered medications for this visit       Allergies   Allergen Reactions    Triamcinolone Rash and Blisters    Acetaminophen Other (See Comments)     Other reaction(s): temp paralysis, red flushing ski  paralysis    Clindamycin GI Intolerance and Other (See Comments)    Corticosteroids      Oral and IV    Ibuprofen Other (See Comments)     Severe headache    Lyrica [Pregabalin]     Penicillins Other (See Comments)    Prochlorperazine Other (See Comments)    Gabapentin Rash and Other (See Comments)     Difficultly breathing      Immunizations:     Immunization History   Administered Date(s) Administered    COVID-19 MODERNA VACC 0 5 ML IM 03/22/2021, 04/21/2021, 11/11/2021, 06/02/2022    INFLUENZA 10/07/2008, 09/24/2009, 10/28/2013, 08/28/2016, 10/01/2020, 11/04/2021, 11/04/2021    Influenza Injectable, MDCK, Preservative Free, Quadrivalent, 0 5 mL 09/26/2019    Influenza Split 10/28/2013    Influenza, injectable, quadrivalent, preservative free 0 5 mL 09/08/2018, 09/03/2020    Pneumococcal Polysaccharide PPV23 10/03/2016    Tdap 10/28/2013    Zoster Vaccine Recombinant 09/26/2019, 01/15/2020      Health Maintenance:         Topic Date Due    Cervical Cancer Screening  Never done    Colorectal Cancer Screening  Never done    Breast Cancer Screening: Mammogram  11/01/2022    Hepatitis C Screening  Completed         Topic Date Due    Pneumococcal Vaccine: 65+ Years (1 - PCV) 09/04/2020    Influenza Vaccine (1) 09/01/2022      Medicare Screening Tests and Risk Assessments:     Goldie Rowe is here for her Subsequent Wellness visit  Health Risk Assessment:   Patient rates overall health as poor  Patient feels that their physical health rating is slightly better  Patient is satisfied with their life  Eyesight was rated as same  Hearing was rated as same  Patient feels that their emotional and mental health rating is much better  Patients states they are never, rarely angry  Patient states they are often unusually tired/fatigued  Pain experienced in the last 7 days has been a lot  Patient's pain rating has been 4/10  Patient states that she has experienced no weight loss or gain in last 6 months  Depression Screening:   PHQ-2 Score: 0      Fall Risk Screening: In the past year, patient has experienced: no history of falling in past year      Urinary Incontinence Screening:   Patient has not leaked urine accidently in the last six months  Home Safety:  Patient does not have trouble with stairs inside or outside of their home  Patient has working smoke alarms and has working carbon monoxide detector  Home safety hazards include: none  Nutrition:   Current diet is Low Carb  Medications:   Patient is currently taking over-the-counter supplements  OTC medications include: melatonin, multivitamin and calcium  Patient is able to manage medications       Activities of Daily Living (ADLs)/Instrumental Activities of Daily Living (IADLs):   Walk and transfer into and out of bed and chair?: Yes  Dress and groom yourself?: No    Bathe or shower yourself?: No    Feed yourself? Yes  Do your laundry/housekeeping?: No  Manage your money, pay your bills and track your expenses?: Yes  Make your own meals?: No    Do your own shopping?: No    Advance Care Planning:   Living will: No    Five wishes given: No      PREVENTIVE SCREENINGS      Cardiovascular Screening:    General: Screening Current      Breast Cancer Screening:     General: History Breast Cancer      Cervical Cancer Screening:    General: Screening Not Indicated      Lung Cancer Screening:     General: Screening Not Indicated      Hepatitis C Screening:    General: Screening Current    Screening, Brief Intervention, and Referral to Treatment (SBIRT)    Screening  Typical number of drinks in a day: 0  Typical number of drinks in a week: 0  Interpretation: Low risk drinking behavior  Review of Current Opioid Use    Opioid Risk Tool (ORT) Interpretation: Complete Opioid Risk Tool (ORT)    No exam data present     Physical Exam:     /84 (BP Location: Left arm, Patient Position: Sitting, Cuff Size: Adult)   Pulse 65   Temp 99 4 °F (37 4 °C) (Temporal)   Resp 18   Ht 5' 3" (1 6 m)   Wt 54 4 kg (120 lb)   SpO2 98%   BMI 21 26 kg/m²     Physical Exam  Vitals (Uses walker at baseline) and nursing note reviewed  Constitutional:       General: She is not in acute distress  Appearance: She is well-developed  HENT:      Head: Normocephalic and atraumatic  Eyes:      Conjunctiva/sclera: Conjunctivae normal    Cardiovascular:      Rate and Rhythm: Normal rate and regular rhythm  Heart sounds: No murmur heard  Pulmonary:      Effort: Pulmonary effort is normal  No respiratory distress  Breath sounds: Normal breath sounds  Abdominal:      Palpations: Abdomen is soft  Tenderness: There is no abdominal tenderness  Musculoskeletal:      Cervical back: Neck supple     Skin: General: Skin is warm and dry  Neurological:      Mental Status: She is alert  Psychiatric:         Mood and Affect: Mood is anxious            Rowena Castillo MD

## 2022-07-20 PROBLEM — Z00.00 HEALTHCARE MAINTENANCE: Status: ACTIVE | Noted: 2022-07-20

## 2022-07-20 NOTE — ASSESSMENT & PLAN NOTE
· Was previously on Xanax but since discontinued per patient by  Pain Medicine  She discontinued it over a week and states that she has had difficulty sleeping and day to day activities while off it  · Advised to discuss with Dr Luis Issa at next appointment to decide if restarting is a possibility

## 2022-07-20 NOTE — ASSESSMENT & PLAN NOTE
· Cologuard ordered  · Mammogram UTD and screened frequent as managed   · Discussed Pneumococcal vaccination  · Did not have time to discuss Cervical Cancer Screening

## 2022-07-20 NOTE — PATIENT INSTRUCTIONS
Medicare Preventive Visit Patient Instructions  Thank you for completing your Welcome to Medicare Visit or Medicare Annual Wellness Visit today  Your next wellness visit will be due in one year (7/21/2023)  The screening/preventive services that you may require over the next 5-10 years are detailed below  Some tests may not apply to you based off risk factors and/or age  Screening tests ordered at today's visit but not completed yet may show as past due  Also, please note that scanned in results may not display below  Preventive Screenings:  Service Recommendations Previous Testing/Comments   Colorectal Cancer Screening  * Colonoscopy    * Fecal Occult Blood Test (FOBT)/Fecal Immunochemical Test (FIT)  * Fecal DNA/Cologuard Test  * Flexible Sigmoidoscopy Age: 54-65 years old   Colonoscopy: every 10 years (may be performed more frequently if at higher risk)  OR  FOBT/FIT: every 1 year  OR  Cologuard: every 3 years  OR  Sigmoidoscopy: every 5 years  Screening may be recommended earlier than age 48 if at higher risk for colorectal cancer  Also, an individualized decision between you and your healthcare provider will decide whether screening between the ages of 74-80 would be appropriate  Colonoscopy: Not on file  FOBT/FIT: Not on file  Cologuard: Not on file  Sigmoidoscopy: Not on file          Breast Cancer Screening Age: 36 years old  Frequency: every 1-2 years  Not required if history of left and right mastectomy Mammogram: 11/01/2021    History Breast Cancer   Cervical Cancer Screening Between the ages of 21-29, pap smear recommended once every 3 years  Between the ages of 33-67, can perform pap smear with HPV co-testing every 5 years     Recommendations may differ for women with a history of total hysterectomy, cervical cancer, or abnormal pap smears in past  Pap Smear: Not on file    Screening Not Indicated   Hepatitis C Screening Once for adults born between 1945 and 1965  More frequently in patients at high risk for Hepatitis C Hep C Antibody: 08/26/2021    Screening Current   Diabetes Screening 1-2 times per year if you're at risk for diabetes or have pre-diabetes Fasting glucose: 84 mg/dL   A1C: No results in last 5 years        Cholesterol Screening Once every 5 years if you don't have a lipid disorder  May order more often based on risk factors  Lipid panel: 04/07/2021    Screening Current     Other Preventive Screenings Covered by Medicare:  1  Abdominal Aortic Aneurysm (AAA) Screening: covered once if your at risk  You're considered to be at risk if you have a family history of AAA  2  Lung Cancer Screening: covers low dose CT scan once per year if you meet all of the following conditions: (1) Age 50-69; (2) No signs or symptoms of lung cancer; (3) Current smoker or have quit smoking within the last 15 years; (4) You have a tobacco smoking history of at least 30 pack years (packs per day multiplied by number of years you smoked); (5) You get a written order from a healthcare provider  3  Glaucoma Screening: covered annually if you're considered high risk: (1) You have diabetes OR (2) Family history of glaucoma OR (3)  aged 48 and older OR (3)  American aged 72 and older  3  Osteoporosis Screening: covered every 2 years if you meet one of the following conditions: (1) You're estrogen deficient and at risk for osteoporosis based off medical history and other findings; (2) Have a vertebral abnormality; (3) On glucocorticoid therapy for more than 3 months; (4) Have primary hyperparathyroidism; (5) On osteoporosis medications and need to assess response to drug therapy  · Last bone density test (DXA Scan): 11/09/2021   5  HIV Screening: covered annually if you're between the age of 15-65  Also covered annually if you are younger than 13 and older than 72 with risk factors for HIV infection   For pregnant patients, it is covered up to 3 times per pregnancy  Immunizations:  Immunization Recommendations   Influenza Vaccine Annual influenza vaccination during flu season is recommended for all persons aged >= 6 months who do not have contraindications   Pneumococcal Vaccine (Prevnar and Pneumovax)  * Prevnar = PCV13  * Pneumovax = PPSV23   Adults 25-60 years old: 1-3 doses may be recommended based on certain risk factors  Adults 72 years old: Prevnar (PCV13) vaccine recommended followed by Pneumovax (PPSV23) vaccine  If already received PPSV23 since turning 65, then PCV13 recommended at least one year after PPSV23 dose  Hepatitis B Vaccine 3 dose series if at intermediate or high risk (ex: diabetes, end stage renal disease, liver disease)   Tetanus (Td) Vaccine - COST NOT COVERED BY MEDICARE PART B Following completion of primary series, a booster dose should be given every 10 years to maintain immunity against tetanus  Td may also be given as tetanus wound prophylaxis  Tdap Vaccine - COST NOT COVERED BY MEDICARE PART B Recommended at least once for all adults  For pregnant patients, recommended with each pregnancy  Shingles Vaccine (Shingrix) - COST NOT COVERED BY MEDICARE PART B  2 shot series recommended in those aged 48 and above     Health Maintenance Due:      Topic Date Due    Cervical Cancer Screening  Never done    Colorectal Cancer Screening  Never done    Breast Cancer Screening: Mammogram  11/01/2022    Hepatitis C Screening  Completed     Immunizations Due:      Topic Date Due    Pneumococcal Vaccine: 65+ Years (1 - PCV) 09/04/2020    Influenza Vaccine (1) 09/01/2022     Advance Directives   What are advance directives? Advance directives are legal documents that state your wishes and plans for medical care  These plans are made ahead of time in case you lose your ability to make decisions for yourself  Advance directives can apply to any medical decision, such as the treatments you want, and if you want to donate organs     What are the types of advance directives? There are many types of advance directives, and each state has rules about how to use them  You may choose a combination of any of the following:  · Living will: This is a written record of the treatment you want  You can also choose which treatments you do not want, which to limit, and which to stop at a certain time  This includes surgery, medicine, IV fluid, and tube feedings  · Durable power of  for healthcare [facility]): This is a written record that states who you want to make healthcare choices for you when you are unable to make them for yourself  This person, called a proxy, is usually a family member or a friend  You may choose more than 1 proxy  · Do not resuscitate (DNR) order:  A DNR order is used in case your heart stops beating or you stop breathing  It is a request not to have certain forms of treatment, such as CPR  A DNR order may be included in other types of advance directives  · Medical directive: This covers the care that you want if you are in a coma, near death, or unable to make decisions for yourself  You can list the treatments you want for each condition  Treatment may include pain medicine, surgery, blood transfusions, dialysis, IV or tube feedings, and a ventilator (breathing machine)  · Values history: This document has questions about your views, beliefs, and how you feel and think about life  This information can help others choose the care that you would choose  Why are advance directives important? An advance directive helps you control your care  Although spoken wishes may be used, it is better to have your wishes written down  Spoken wishes can be misunderstood, or not followed  Treatments may be given even if you do not want them  An advance directive may make it easier for your family to make difficult choices about your care        © Copyright [employer] 2018 Information is for End User's use only and may not be sold, redistributed or otherwise used for commercial purposes   All illustrations and images included in CareNotes® are the copyrighted property of A D A M , Inc  or [facility] Brandon Varner

## 2022-09-01 ENCOUNTER — TELEPHONE (OUTPATIENT)
Dept: FAMILY MEDICINE CLINIC | Facility: CLINIC | Age: 67
End: 2022-09-01

## 2022-09-01 NOTE — TELEPHONE ENCOUNTER
Pt called the nurse line stating she has an infected bite on her right had, happened 8/30/22 pt concerned of developing a cellulitis, pt requesting cephalexin  I called pt, offered a same day appt pt declined, I explained the pt  that is important for the doctor to look at the infected area to properly treat it  Per pt her daughter is getting  and she is unable to come in

## 2022-09-07 NOTE — TELEPHONE ENCOUNTER
Please call patient back and see how she is doing  If there is still a concern, please offer same day appointment again   Thanks

## 2022-10-11 PROBLEM — Z00.00 HEALTHCARE MAINTENANCE: Status: RESOLVED | Noted: 2022-07-20 | Resolved: 2022-10-11

## 2022-10-19 ENCOUNTER — OFFICE VISIT (OUTPATIENT)
Dept: FAMILY MEDICINE CLINIC | Facility: CLINIC | Age: 67
End: 2022-10-19

## 2022-10-19 VITALS
OXYGEN SATURATION: 96 % | WEIGHT: 120 LBS | HEART RATE: 72 BPM | HEIGHT: 63 IN | TEMPERATURE: 98.7 F | RESPIRATION RATE: 16 BRPM | BODY MASS INDEX: 21.26 KG/M2 | DIASTOLIC BLOOD PRESSURE: 80 MMHG | SYSTOLIC BLOOD PRESSURE: 140 MMHG

## 2022-10-19 DIAGNOSIS — C50.812 MALIGNANT NEOPLASM OF OVERLAPPING SITES OF LEFT BREAST IN FEMALE, ESTROGEN RECEPTOR POSITIVE (HCC): ICD-10-CM

## 2022-10-19 DIAGNOSIS — M48.061 DEGENERATIVE LUMBAR SPINAL STENOSIS: ICD-10-CM

## 2022-10-19 DIAGNOSIS — Z23 ENCOUNTER FOR IMMUNIZATION: ICD-10-CM

## 2022-10-19 DIAGNOSIS — I89.0 LYMPHEDEMA OF LEFT ARM: Primary | ICD-10-CM

## 2022-10-19 DIAGNOSIS — R91.1 LUNG NODULE: ICD-10-CM

## 2022-10-19 DIAGNOSIS — F43.22 ADJUSTMENT DISORDER WITH ANXIETY: ICD-10-CM

## 2022-10-19 DIAGNOSIS — T85.9XXA COMPLICATION OF BREAST IMPLANT: ICD-10-CM

## 2022-10-19 DIAGNOSIS — F11.20 CONTINUOUS OPIOID DEPENDENCE (HCC): ICD-10-CM

## 2022-10-19 DIAGNOSIS — Z17.0 MALIGNANT NEOPLASM OF OVERLAPPING SITES OF LEFT BREAST IN FEMALE, ESTROGEN RECEPTOR POSITIVE (HCC): ICD-10-CM

## 2022-10-19 PROCEDURE — 3079F DIAST BP 80-89 MM HG: CPT | Performed by: FAMILY MEDICINE

## 2022-10-19 PROCEDURE — 3077F SYST BP >= 140 MM HG: CPT | Performed by: FAMILY MEDICINE

## 2022-10-19 PROCEDURE — G0008 ADMIN INFLUENZA VIRUS VAC: HCPCS | Performed by: FAMILY MEDICINE

## 2022-10-19 PROCEDURE — 99214 OFFICE O/P EST MOD 30 MIN: CPT | Performed by: FAMILY MEDICINE

## 2022-10-19 PROCEDURE — 90662 IIV NO PRSV INCREASED AG IM: CPT | Performed by: FAMILY MEDICINE

## 2022-10-19 NOTE — PROGRESS NOTES
Name: Emily Newsome      : 1955      MRN: [id number]  Encounter Provider: Lexy Lewis DO  Encounter Date: 10/19/2022   Encounter department: [id number] [place] [address]     1  Lymphedema of left arm  Assessment & Plan:  Most likely will have liposuction added to left chest to help smooth it and help with the lymphedema  Still going to lymphedema clinic  2  Encounter for immunization  -     influenza vaccine, high-dose, PF 0 7 mL (FLUZONE HIGH-DOSE)    3  Malignant neoplasm of overlapping sites of left breast in female, estrogen receptor positive [facility])  Assessment & Plan:  Follow-up oncologist as needed  Follow-up breast surgeon as needed        4  Lung nodule  Assessment & Plan:  -Patient was supposed to have had repeat CT chest in 3 to 6 months from last CT which was in 2019 (6 mm calcified granuloma in LLL)  -Patient would rather talk to her radiation oncologist regarding the need to repeat or not - she is not ready to go for CT at this time      5  Adjustment disorder with anxiety  Assessment & Plan:  Pain management specialist changed about 6 months ago and changed her medications  He told her to stop xanax  Taking melatonin 1 mg at bedtime  Helps her stay calm before bedtime  Offered hydroxyzine but patient declined at the moment - she mentioned she will think about it and let me know if yes       6  Degenerative lumbar spinal stenosis  Assessment & Plan:  Last saw physiatrist in 2020  Continue pain medications   Continue home stretching exercises      7  Continuous opioid dependence (HCC)  Assessment & Plan:  Gets hydromorphone and morphine from Pain Management      8   Complication of breast implant  Assessment & Plan:  S/p breast implant removal in 2022 - site reconstructed  Most likely will have liposuction added to left chest to help smooth it  F/u plastic surgeon           Subjective      Pain management specialist changed about 6 months ago and changed her medications  He told her to stop xanax  Taking melatonin 1 mg at bedtime  Helps her stay calm before bedtime  Goes on bike inside the house  Walks a lot inside the house  Needs chest pump for lymphedema  Medicare may pay 80% but [facility]   They will be sending papers for waiver program for me to fill out  Most likely will have liposuction added to left chest to help smooth it and help with the lymphedema  Still going to lymphedema clinic  On the [facility], gets assistance with bathing  They come twice per week  Patient told me she wants to postpone any blood work until next year  Review of Systems   Constitutional: Negative for chills, fatigue, fever and unexpected weight change  HENT: Negative for congestion, ear pain, rhinorrhea and sore throat  Eyes: Negative for pain and visual disturbance  Respiratory: Negative for cough, chest tightness and shortness of breath  Cardiovascular: Negative for chest pain, palpitations and leg swelling  Gastrointestinal: Negative for abdominal pain, constipation, diarrhea, nausea and vomiting  Genitourinary: Negative for difficulty urinating, dysuria and urgency  Musculoskeletal: Negative for arthralgias and back pain  Skin: Negative for rash  Neurological: Negative for dizziness, numbness and headaches  Psychiatric/Behavioral: Negative for behavioral problems and suicidal ideas  The patient is nervous/anxious          Current Outpatient Medications on File Prior to Visit   Medication Sig   • aspirin 325 mg tablet Take 325 mg by mouth as needed for mild pain   • Calcium-Phosphorus-Vitamin D (CALCIUM GUMMIES PO) Take 1 capsule by mouth 3 (three) times a day   • Cholecalciferol (VITAMIN D3 PO) Take 1 capsule by mouth daily   • glycerin adult 2 g suppository Insert 1 suppository into the rectum as needed for constipation   • Glycerin-Polysorbate 80 (REFRESH DRY EYE THERAPY OP) Apply 1 drop to eye   • HYDROmorphone (DILAUDID) 4 mg tablet take 1 tablet (4MG)  by oral route  every 4 - 6 hours as needed   • lidocaine (XYLOCAINE) 5 % ointment Apply topically as needed for mild pain   • Morphine Sulfate ER 30 MG TBEA Take 1 tablet by mouth 3 (three) times a day   • multivitamin (THERAGRAN) TABS Take 1 tablet by mouth daily       Objective     /80 (BP Location: Right arm, Patient Position: Sitting, Cuff Size: Standard)   Pulse 72   Temp 98 7 °F (37 1 °C) (Temporal)   Resp 16   Ht 5' 3" (1 6 m)   Wt 54 4 kg (120 lb)   SpO2 96%   Breastfeeding No   BMI 21 26 kg/m²     Physical Exam  Constitutional:       Appearance: She is well-developed  HENT:      Head: Normocephalic and atraumatic  Right Ear: External ear normal       Left Ear: External ear normal       Nose: Nose normal    Eyes:      Conjunctiva/sclera: Conjunctivae normal       Pupils: Pupils are equal, round, and reactive to light  Cardiovascular:      Rate and Rhythm: Normal rate and regular rhythm  Heart sounds: Normal heart sounds  Pulmonary:      Effort: Pulmonary effort is normal       Breath sounds: Normal breath sounds  Abdominal:      General: Bowel sounds are normal       Palpations: Abdomen is soft  Musculoskeletal:         General: Normal range of motion  Cervical back: Normal range of motion and neck supple  Skin:     General: Skin is warm  Neurological:      Mental Status: She is alert and oriented to person, place, and time  Psychiatric:         Attention and Perception: Attention and perception normal          Mood and Affect: Affect normal  Mood is anxious  Speech: Speech normal          Behavior: Behavior normal          Thought Content: Thought content does not include homicidal or suicidal ideation           Cognition and Memory: Cognition and memory normal          Judgment: Judgment normal        Rehab DO Nasima

## 2022-10-22 NOTE — ASSESSMENT & PLAN NOTE
Last saw physiatrist in December 2020  Continue pain medications   Continue home stretching exercises

## 2022-10-22 NOTE — ASSESSMENT & PLAN NOTE
Most likely will have liposuction added to left chest to help smooth it and help with the lymphedema  Still going to lymphedema clinic

## 2022-10-22 NOTE — ASSESSMENT & PLAN NOTE
S/p breast implant removal in January 2022 - site reconstructed  Most likely will have liposuction added to left chest to help smooth it  F/u plastic surgeon

## 2022-10-22 NOTE — ASSESSMENT & PLAN NOTE
Pain management specialist changed about 6 months ago and changed her medications  He told her to stop xanax  Taking melatonin 1 mg at bedtime  Helps her stay calm before bedtime      Offered hydroxyzine but patient declined at the moment - she mentioned she will think about it and let me know if yes

## 2022-12-20 ENCOUNTER — TELEPHONE (OUTPATIENT)
Dept: FAMILY MEDICINE CLINIC | Facility: CLINIC | Age: 67
End: 2022-12-20

## 2022-12-20 NOTE — TELEPHONE ENCOUNTER
PATIENT CONTACTED OFFICE REQUESTING PROVIDER SEND HER MEDICATION FOR COVID   PATIENT STATES SHE ALREADY DISCUSSED THIS WITH PROVIDER     PLEASE ADVISE

## 2022-12-21 NOTE — TELEPHONE ENCOUNTER
Received call from parent/patient: Patient requesting a status of the call back  Any questions please call [phone]

## 2022-12-22 NOTE — TELEPHONE ENCOUNTER
Pt calling again today again that no meds were sent    I told clerical staff she needed a virtual visit  Pt said she already spoke to PCP who said she was going to send medication      [place] Text to PCP, stated she would address after 1pm

## 2022-12-23 DIAGNOSIS — U07.1 COVID-19 VIRUS INFECTION: Primary | ICD-10-CM

## 2022-12-23 RX ORDER — NIRMATRELVIR AND RITONAVIR 300-100 MG
3 KIT ORAL 2 TIMES DAILY
Qty: 30 TABLET | Refills: 0 | Status: SHIPPED | OUTPATIENT
Start: 2022-12-23 | End: 2022-12-28

## 2022-12-23 NOTE — PROGRESS NOTES
Spoke to patient who told me she tested positive on 12/20/22, the onset of symptoms  Her partner had tested positive days prior and was placed on paxlovid  Asked if she can be placed on the medication also  Paxlovid has been filled and sent to pharmacy  Explained the instructions to patient  ER precautions given

## 2022-12-23 NOTE — TELEPHONE ENCOUNTER
Pt states she has tested positive a couple days ago  and her spouse is also very ill with covid  Pt informed Kenneth Callahan will be reaching out to her this afternoon

## 2023-01-11 LAB — EXTERNAL COLOGUARD RESULT: NEGATIVE

## 2023-08-04 ENCOUNTER — TELEPHONE (OUTPATIENT)
Dept: FAMILY MEDICINE CLINIC | Facility: CLINIC | Age: 68
End: 2023-08-04

## 2023-08-04 NOTE — TELEPHONE ENCOUNTER
PCP [address] [place] [address] of [address] and Adult Services FORM RECEIVED VIA FAX AND PLACED IN PCP FOLDER TO BE DELIVERED AT ASSIGNED TIMES.     Physician Certification Form

## 2023-08-16 ENCOUNTER — OFFICE VISIT (OUTPATIENT)
Dept: FAMILY MEDICINE CLINIC | Facility: CLINIC | Age: 68
End: 2023-08-16

## 2023-08-16 VITALS
BODY MASS INDEX: 21.26 KG/M2 | TEMPERATURE: 98 F | HEIGHT: 63 IN | SYSTOLIC BLOOD PRESSURE: 136 MMHG | RESPIRATION RATE: 18 BRPM | OXYGEN SATURATION: 98 % | DIASTOLIC BLOOD PRESSURE: 78 MMHG | WEIGHT: 120 LBS | HEART RATE: 82 BPM

## 2023-08-16 DIAGNOSIS — Z79.891 CHRONICALLY ON OPIATE THERAPY: Primary | ICD-10-CM

## 2023-08-16 DIAGNOSIS — Z00.00 ANNUAL PHYSICAL EXAM: ICD-10-CM

## 2023-08-16 DIAGNOSIS — M85.88 OSTEOPENIA OF SPINE: ICD-10-CM

## 2023-08-16 DIAGNOSIS — F43.22 ADJUSTMENT DISORDER WITH ANXIETY: ICD-10-CM

## 2023-08-16 PROCEDURE — 99397 PER PM REEVAL EST PAT 65+ YR: CPT | Performed by: FAMILY MEDICINE

## 2023-08-16 NOTE — PROGRESS NOTES
[facility]    NAME: Kellie Engel  AGE: 79 y.o. SEX: female  : 1955   DATE: 2023   Assessment and Plan:     Problem List Items Addressed This Visit        Other    Adjustment disorder with anxiety     Ongoing management with [facility] pain mx    -          Chronically on opiate therapy - Primary    Relevant Orders    CBC and differential    Comprehensive metabolic panel    Vitamin B12    TSH, 3rd generation with Free T4 reflex    Magnesium   Other Visit Diagnoses     Annual physical exam          Immunizations and preventive care screenings were discussed with patient today. Appropriate education was printed on patient's after visit summary. Counseling:  · DEXA encouraged     No follow-ups on file. Chief Complaint:     Chief Complaint   Patient presents with   • Follow-up      History of Present Illness:     Adult Annual Physical   Patient here for a comprehensive physical exam. The patient reports chronic medical conditions are stable presently. .    Diet and Physical Activity  · Diet/Nutrition: well balanced diet and intermittenyl. · Exercise: at baseline using walker secondary to comorbidities. Depression Screening  PHQ-2/9 Depression Screening    Little interest or pleasure in doing things: 0 - not at all  Feeling down, depressed, or hopeless: 0 - not at all  PHQ-2 Score: 0  PHQ-2 Interpretation: Negative depression screen       General Health  · Sleep: sleeps well. · Hearing: normal - none . · Vision: deferred. /GYN Health  · Patient is: postmenopausal      Review of Systems:   Review of Systems   Constitutional: Negative for chills, fatigue, fever and unexpected weight change. HENT: Negative for congestion, ear pain, rhinorrhea and sore throat. Eyes: Negative for pain and visual disturbance. Respiratory: Negative for cough, chest tightness and shortness of breath. Cardiovascular: Negative for chest pain, palpitations and leg swelling. Gastrointestinal: Negative for abdominal pain, constipation, diarrhea, nausea and vomiting. Genitourinary: Negative for difficulty urinating, dysuria and urgency. Musculoskeletal: Positive for arthralgias, back pain and gait problem. Skin: Negative for rash. Neurological: Negative for dizziness, numbness and headaches. Psychiatric/Behavioral: Negative for behavioral problems and suicidal ideas. The patient is nervous/anxious. Past Medical History:     Past Medical History:   Diagnosis Date   • Breast cancer ([address])    • DJD (degenerative joint disease)    • History of blood clots    • Osteoarthritis     left hip   • Osteoarthritis    • Pain       Past Surgical History:     Past Surgical History:   Procedure Laterality Date   • BREAST IMPLANT Left 06/25/2019    Left breast tissue expander removal. open capsulectomy,gel implant insertion. Dr Manjeet Butler   • CATARACT EXTRACTION, BILATERAL Bilateral 7/21/21 8/4/21   • MASTOPEXY Right 06/25/2019    Right mastopexy for symmetry   • OVARY SURGERY     • TOTAL HIP ARTHROPLASTY     • US GUIDANCE BREAST BIOPSY LEFT EACH ADDITIONAL Left 9/19/2018   • US GUIDANCE BREAST BIOPSY LEFT EACH ADDITIONAL Left 9/19/2018   • US GUIDED BREAST BIOPSY LEFT COMPLETE Left 9/19/2018      Social History:     Social History     Socioeconomic History   • Marital status:       Spouse name: None   • Number of children: None   • Years of education: None   • Highest education level: None   Occupational History   • None   Tobacco Use   • Smoking status: Never     Passive exposure: Never   • Smokeless tobacco: Never   Vaping Use   • Vaping Use: Never used   Substance and Sexual Activity   • Alcohol use: No   • Drug use: No   • Sexual activity: None   Other Topics Concern   • None   Social History Narrative   • None     Social Determinants of Health     Financial Resource Strain: Low Risk  (10/19/2022) Overall Financial Resource Strain (CARDIA)    • Difficulty of Paying Living Expenses: Not hard at all   Food Insecurity: No Food Insecurity (10/19/2022)    Hunger Vital Sign    • Worried About Running Out of Food in the Last Year: Never true    • Ran Out of Food in the Last Year: Never true   Transportation Needs: No Transportation Needs (10/19/2022)    PRAPARE - Transportation    • Lack of Transportation (Medical): No    • Lack of Transportation (Non-Medical): No   Physical Activity: Not on file   Stress: Not on file   Social Connections: Not on file   Intimate Partner Violence: Not on file   Housing Stability: Low Risk  (11/10/2021)    Housing Stability Vital Sign    • Unable to Pay for Housing in the Last Year: No    • Number of Places Lived in the Last Year: 1    • Unstable Housing in the Last Year: No      Family History:     Family History   Problem Relation Age of Onset   • Breast cancer Mother 43   • Lymphoma Mother    • Breast cancer Maternal Aunt       Current Medications:     Current Outpatient Medications   Medication Sig Dispense Refill   • aspirin 325 mg tablet Take 325 mg by mouth as needed for mild pain     • Calcium-Phosphorus-Vitamin D (CALCIUM GUMMIES PO) Take 1 capsule by mouth 3 (three) times a day     • glycerin adult 2 g suppository Insert 1 suppository into the rectum as needed for constipation     • Glycerin-Polysorbate 80 (REFRESH DRY EYE THERAPY OP) Apply 1 drop to eye     • HYDROmorphone (DILAUDID) 4 mg tablet take 1 tablet (4MG)  by oral route  every 4 - 6 hours as needed     • lidocaine (XYLOCAINE) 5 % ointment Apply topically as needed for mild pain 35.44 g 0   • Morphine Sulfate ER 30 MG TBEA Take 1 tablet by mouth 3 (three) times a day     • multivitamin (THERAGRAN) TABS Take 1 tablet by mouth daily       No current facility-administered medications for this visit. Allergies:      Allergies   Allergen Reactions   • Triamcinolone Rash and Blisters   • Acetaminophen Other (See Comments)     Other reaction(s): temp paralysis, red flushing ski  paralysis   • Clindamycin GI Intolerance and Other (See Comments)   • Corticosteroids      Oral and IV   • Ibuprofen Other (See Comments)     Severe headache   • Lyrica [Pregabalin]    • Penicillins Other (See Comments)   • Prochlorperazine Other (See Comments)   • Gabapentin Rash and Other (See Comments)     Difficultly breathing      Physical Exam:   /78 (BP Location: Left arm, Patient Position: Sitting, Cuff Size: Standard)   Pulse 82   Temp 98 °F (36.7 °C) (Temporal)   Resp 18   Ht 5' 3" (1.6 m)   Wt 54.4 kg (120 lb)   SpO2 98%   BMI 21.26 kg/m²     Physical Exam  Vitals (Uses walker at baseline) and nursing note reviewed. Constitutional:       General: She is not in acute distress. Appearance: She is well-developed. She is not ill-appearing. HENT:      Head: Normocephalic and atraumatic. Eyes:      Conjunctiva/sclera: Conjunctivae normal.   Cardiovascular:      Rate and Rhythm: Normal rate and regular rhythm. Heart sounds: No murmur heard. Pulmonary:      Effort: Pulmonary effort is normal. No respiratory distress. Breath sounds: Normal breath sounds. Abdominal:      Palpations: Abdomen is soft. Tenderness: There is no abdominal tenderness. Musculoskeletal:      Cervical back: Neck supple. Skin:     General: Skin is warm and dry. Neurological:      Mental Status: She is alert. Psychiatric:         Mood and Affect: Mood is anxious.        Giselle Tran MD  [address] [place] [address]

## 2023-08-16 NOTE — PATIENT INSTRUCTIONS
Wellness Visit for Adults   AMBULATORY CARE:   A wellness visit  is when you see your healthcare provider to get screened for health problems. Your healthcare provider will also give you advice on how to stay healthy. Write down your questions so you remember to ask them. Ask your healthcare provider how often you should have a wellness visit. What happens at a wellness visit:  Your healthcare provider will ask about your health, and your family history of health problems. This includes high blood pressure, heart disease, and cancer. He or she will ask if you have symptoms that concern you, if you smoke, and about your mood. You may also be asked about your intake of medicines, supplements, food, and alcohol. Any of the following may be done: Your weight  will be checked. Your height may also be checked so your body mass index (BMI) can be calculated. Your BMI shows if you are at a healthy weight. Your blood pressure  and heart rate will be checked. Your temperature may also be checked. Blood and urine tests  may be done. Blood tests may be done to check your cholesterol levels. Abnormal cholesterol levels increase your risk for heart disease and stroke. You may also need a blood or urine test to check for diabetes if you are at increased risk. Urine tests may be done to look for signs of an infection or kidney disease. A physical exam  includes checking your heartbeat and lungs with a stethoscope. Your healthcare provider may also check your skin to look for sun damage. Screening tests  may be recommended. A screening test is done to check for diseases that may not cause symptoms. The screening tests you may need depend on your age, gender, family history, and lifestyle habits. For example, colorectal screening may be recommended if you are 48years old or older. Screening tests you need if you are a woman:   A Pap smear  is used to screen for cervical cancer.  Pap smears are usually done every 3 to 5 years depending on your age. You may need them more often if you have had abnormal Pap smear test results in the past. Ask your healthcare provider how often you should have a Pap smear. A mammogram  is an x-ray of your breasts to screen for breast cancer. Experts recommend mammograms every 2 years starting at age 48 years. You may need a mammogram at age 52 years or younger if you have an increased risk for breast cancer. Talk to your healthcare provider about when you should start having mammograms and how often you need them. Vaccines you may need:   Get an influenza vaccine  every year. The influenza vaccine protects you from the flu. Several types of viruses cause the flu. The viruses change over time, so new vaccines are made each year. Get a tetanus-diphtheria (Td) booster vaccine  every 10 years. This vaccine protects you against tetanus and diphtheria. Tetanus is a severe infection that may cause painful muscle spasms and lockjaw. Diphtheria is a severe bacterial infection that causes a thick covering in the back of your mouth and throat. Get a human papillomavirus (HPV) vaccine  if you are female and aged 23 to 32 or male 23 to 24 and never received it. This vaccine protects you from HPV infection. HPV is the most common infection spread by sexual contact. HPV may also cause vaginal, penile, and anal cancers. Get a pneumococcal vaccine  if you are aged 72 years or older. The pneumococcal vaccine is an injection given to protect you from pneumococcal disease. Pneumococcal disease is an infection caused by pneumococcal bacteria. The infection may cause pneumonia, meningitis, or an ear infection. Get a shingles vaccine  if you are 60 or older, even if you have had shingles before. The shingles vaccine is an injection to protect you from the varicella-zoster virus. This is the same virus that causes chickenpox.  Shingles is a painful rash that develops in people who had chickenpox or have been exposed to the virus. How to eat healthy:  My Plate is a model for planning healthy meals. It shows the types and amounts of foods that should go on your plate. Fruits and vegetables make up about half of your plate, and grains and protein make up the other half. A serving of dairy is included on the side of your plate. The amount of calories and serving sizes you need depends on your age, gender, weight, and height. Examples of healthy foods are listed below:  Eat a variety of vegetables  such as dark green, red, and orange vegetables. You can also include canned vegetables low in sodium (salt) and frozen vegetables without added butter or sauces. Eat a variety of fresh fruits , canned fruit in 100% juice, frozen fruit, and dried fruit. Include whole grains. At least half of the grains you eat should be whole grains. Examples include whole-wheat bread, wheat pasta, brown rice, and whole-grain cereals such as oatmeal.    Eat a variety of protein foods such as seafood (fish and shellfish), lean meat, and poultry without skin (turkey and chicken). Examples of lean meats include pork leg, shoulder, or tenderloin, and beef round, sirloin, tenderloin, and extra lean ground beef. Other protein foods include eggs and egg substitutes, beans, peas, soy products, nuts, and seeds. Choose low-fat dairy products such as skim or 1% milk or low-fat yogurt, cheese, and cottage cheese. Limit unhealthy fats  such as butter, hard margarine, and shortening. Exercise:  Exercise at least 30 minutes per day on most days of the week. Some examples of exercise include walking, biking, dancing, and swimming. You can also fit in more physical activity by taking the stairs instead of the elevator or parking farther away from stores. Include muscle strengthening activities 2 days each week. Regular exercise provides many health benefits.  It helps you manage your weight, and decreases your risk for type 2 diabetes, heart disease, stroke, and high blood pressure. Exercise can also help improve your mood. Ask your healthcare provider about the best exercise plan for you. General health and safety guidelines:   Do not smoke. Nicotine and other chemicals in cigarettes and cigars can cause lung damage. Ask your healthcare provider for information if you currently smoke and need help to quit. E-cigarettes or smokeless tobacco still contain nicotine. Talk to your healthcare provider before you use these products. Limit alcohol. A drink of alcohol is 12 ounces of beer, 5 ounces of wine, or 1½ ounces of liquor. Lose weight, if needed. Being overweight increases your risk of certain health conditions. These include heart disease, high blood pressure, type 2 diabetes, and certain types of cancer. Protect your skin. Do not sunbathe or use tanning beds. Use sunscreen with a SPF 15 or higher. Apply sunscreen at least 15 minutes before you go outside. Reapply sunscreen every 2 hours. Wear protective clothing, hats, and sunglasses when you are outside. Drive safely. Always wear your seatbelt. Make sure everyone in your car wears a seatbelt. A seatbelt can save your life if you are in an accident. Do not use your cell phone when you are driving. This could distract you and cause an accident. Pull over if you need to make a call or send a text message. Practice safe sex. Use latex condoms if are sexually active and have more than one partner. Your healthcare provider may recommend screening tests for sexually transmitted infections (STIs). Wear helmets, lifejackets, and protective gear. Always wear a helmet when you ride a bike or motorcycle, go skiing, or play sports that could cause a head injury. Wear protective equipment when you play sports. Wear a lifejacket when you are on a boat or doing water sports.     © Copyright [employer] 2022 Information is for End User's use only and may not be sold, redistributed or otherwise used for commercial purposes. The above information is an  only. It is not intended as medical advice for individual conditions or treatments. Talk to your doctor, nurse or pharmacist before following any medical regimen to see if it is safe and effective for you.

## 2023-08-17 ENCOUNTER — APPOINTMENT (OUTPATIENT)
Dept: LAB | Facility: CLINIC | Age: 68
End: 2023-08-17
Payer: COMMERCIAL

## 2023-08-17 DIAGNOSIS — Z79.891 CHRONICALLY ON OPIATE THERAPY: ICD-10-CM

## 2023-08-17 LAB
ALBUMIN SERPL BCP-MCNC: 4.1 G/DL (ref 3.5–5)
ALP SERPL-CCNC: 126 U/L (ref 46–116)
ALT SERPL W P-5'-P-CCNC: 81 U/L (ref 12–78)
ANION GAP SERPL CALCULATED.3IONS-SCNC: 3 MMOL/L
AST SERPL W P-5'-P-CCNC: 56 U/L (ref 5–45)
BASOPHILS # BLD AUTO: 0.04 THOUSANDS/ÂΜL (ref 0–0.1)
BASOPHILS NFR BLD AUTO: 1 % (ref 0–1)
BILIRUB SERPL-MCNC: 1.34 MG/DL (ref 0.2–1)
BUN SERPL-MCNC: 16 MG/DL (ref 5–25)
CALCIUM SERPL-MCNC: 8.9 MG/DL (ref 8.3–10.1)
CHLORIDE SERPL-SCNC: 107 MMOL/L (ref 96–108)
CO2 SERPL-SCNC: 32 MMOL/L (ref 21–32)
CREAT SERPL-MCNC: 0.79 MG/DL (ref 0.6–1.3)
EOSINOPHIL # BLD AUTO: 0.18 THOUSAND/ÂΜL (ref 0–0.61)
EOSINOPHIL NFR BLD AUTO: 3 % (ref 0–6)
ERYTHROCYTE [DISTWIDTH] IN BLOOD BY AUTOMATED COUNT: 12.3 % (ref 11.6–15.1)
GFR SERPL CREATININE-BSD FRML MDRD: 77 ML/MIN/1.73SQ M
GLUCOSE P FAST SERPL-MCNC: 117 MG/DL (ref 65–99)
HCT VFR BLD AUTO: 46.1 % (ref 34.8–46.1)
HGB BLD-MCNC: 14.8 G/DL (ref 11.5–15.4)
IMM GRANULOCYTES # BLD AUTO: 0.02 THOUSAND/UL (ref 0–0.2)
IMM GRANULOCYTES NFR BLD AUTO: 0 % (ref 0–2)
LYMPHOCYTES # BLD AUTO: 1.46 THOUSANDS/ÂΜL (ref 0.6–4.47)
LYMPHOCYTES NFR BLD AUTO: 27 % (ref 14–44)
MAGNESIUM SERPL-MCNC: 2.5 MG/DL (ref 1.6–2.6)
MCH RBC QN AUTO: 29.6 PG (ref 26.8–34.3)
MCHC RBC AUTO-ENTMCNC: 32.1 G/DL (ref 31.4–37.4)
MCV RBC AUTO: 92 FL (ref 82–98)
MONOCYTES # BLD AUTO: 0.74 THOUSAND/ÂΜL (ref 0.17–1.22)
MONOCYTES NFR BLD AUTO: 14 % (ref 4–12)
NEUTROPHILS # BLD AUTO: 3.04 THOUSANDS/ÂΜL (ref 1.85–7.62)
NEUTS SEG NFR BLD AUTO: 55 % (ref 43–75)
NRBC BLD AUTO-RTO: 0 /100 WBCS
PLATELET # BLD AUTO: 215 THOUSANDS/UL (ref 149–390)
PMV BLD AUTO: 10.8 FL (ref 8.9–12.7)
POTASSIUM SERPL-SCNC: 4.2 MMOL/L (ref 3.5–5.3)
PROT SERPL-MCNC: 7.2 G/DL (ref 6.4–8.4)
RBC # BLD AUTO: 5 MILLION/UL (ref 3.81–5.12)
SODIUM SERPL-SCNC: 142 MMOL/L (ref 135–147)
TSH SERPL DL<=0.05 MIU/L-ACNC: 2.02 UIU/ML (ref 0.45–4.5)
VIT B12 SERPL-MCNC: 759 PG/ML (ref 180–914)
WBC # BLD AUTO: 5.48 THOUSAND/UL (ref 4.31–10.16)

## 2023-08-17 PROCEDURE — 85025 COMPLETE CBC W/AUTO DIFF WBC: CPT

## 2023-08-17 PROCEDURE — 80053 COMPREHEN METABOLIC PANEL: CPT

## 2023-08-17 PROCEDURE — 84443 ASSAY THYROID STIM HORMONE: CPT

## 2023-08-17 PROCEDURE — 83735 ASSAY OF MAGNESIUM: CPT

## 2023-08-17 PROCEDURE — 36415 COLL VENOUS BLD VENIPUNCTURE: CPT

## 2023-08-17 PROCEDURE — 82607 VITAMIN B-12: CPT

## 2023-09-14 ENCOUNTER — TELEPHONE (OUTPATIENT)
Dept: FAMILY MEDICINE CLINIC | Facility: CLINIC | Age: 68
End: 2023-09-14

## 2023-09-14 DIAGNOSIS — R76.8 HEPATITIS C ANTIBODY POSITIVE IN BLOOD: Primary | ICD-10-CM

## 2023-09-14 DIAGNOSIS — R74.01 TRANSAMINITIS: ICD-10-CM

## 2023-09-14 NOTE — TELEPHONE ENCOUNTER
Lab Results   Component Value Date    ALT 81 (H) 08/17/2023    AST 56 (H) 08/17/2023    ALKPHOS 126 (H) 08/17/2023   Phonecall to pt regarding lab results. Explained deranged LFT. In view of hx of HepC+ without f/up would like to workup further. Pt believes her HEP C is secondary to first dose of COVID vaccine which she has done extensive. Note US abdo 2020 WNL. - Pt agreeable to have hep panel and rpt LFT's with hepC genotype. - Pt will arrange a virtual appt.

## 2024-02-19 ENCOUNTER — OFFICE VISIT (OUTPATIENT)
Dept: FAMILY MEDICINE CLINIC | Facility: CLINIC | Age: 69
End: 2024-02-19

## 2024-02-19 VITALS
RESPIRATION RATE: 16 BRPM | HEART RATE: 65 BPM | OXYGEN SATURATION: 95 % | SYSTOLIC BLOOD PRESSURE: 140 MMHG | HEIGHT: 63 IN | BODY MASS INDEX: 21.79 KG/M2 | WEIGHT: 123 LBS | TEMPERATURE: 97.7 F | DIASTOLIC BLOOD PRESSURE: 80 MMHG

## 2024-02-19 DIAGNOSIS — Z00.00 MEDICARE ANNUAL WELLNESS VISIT, INITIAL: Primary | ICD-10-CM

## 2024-02-19 PROCEDURE — 3079F DIAST BP 80-89 MM HG: CPT | Performed by: FAMILY MEDICINE

## 2024-02-19 PROCEDURE — 3077F SYST BP >= 140 MM HG: CPT | Performed by: FAMILY MEDICINE

## 2024-02-19 PROCEDURE — G0439 PPPS, SUBSEQ VISIT: HCPCS | Performed by: FAMILY MEDICINE

## 2024-02-19 NOTE — PATIENT INSTRUCTIONS
Medicare Preventive Visit Patient Instructions  Thank you for completing your Welcome to Medicare Visit or Medicare Annual Wellness Visit today. Your next wellness visit will be due in one year (2/19/2025).  The screening/preventive services that you may require over the next 5-10 years are detailed below. Some tests may not apply to you based off risk factors and/or age. Screening tests ordered at today's visit but not completed yet may show as past due. Also, please note that scanned in results may not display below.  Preventive Screenings:  Service Recommendations Previous Testing/Comments   Colorectal Cancer Screening  * Colonoscopy    * Fecal Occult Blood Test (FOBT)/Fecal Immunochemical Test (FIT)  * Fecal DNA/Cologuard Test  * Flexible Sigmoidoscopy Age: 45-75 years old   Colonoscopy: every 10 years (may be performed more frequently if at higher risk)  OR  FOBT/FIT: every 1 year  OR  Cologuard: every 3 years  OR  Sigmoidoscopy: every 5 years  Screening may be recommended earlier than age 45 if at higher risk for colorectal cancer. Also, an individualized decision between you and your healthcare provider will decide whether screening between the ages of 76-85 would be appropriate. Colonoscopy: 01/11/2023  FOBT/FIT: 01/11/2023  Cologuard: 01/11/2023  Sigmoidoscopy: 01/11/2023    Screening Current     Breast Cancer Screening Age: 40+ years old  Frequency: every 1-2 years  Not required if history of left and right mastectomy Mammogram: 09/11/2019    History Breast Cancer   Cervical Cancer Screening Between the ages of 21-29, pap smear recommended once every 3 years.   Between the ages of 30-65, can perform pap smear with HPV co-testing every 5 years.   Recommendations may differ for women with a history of total hysterectomy, cervical cancer, or abnormal pap smears in past. Pap Smear: Not on file    Screening Not Indicated   Hepatitis C Screening Once for adults born between 1945 and 1965  More frequently in  patients at high risk for Hepatitis C Hep C Antibody: 08/26/2021    Screening Current   Diabetes Screening 1-2 times per year if you're at risk for diabetes or have pre-diabetes Fasting glucose: 117 mg/dL (8/17/2023)  A1C: No results in last 5 years (No results in last 5 years)  Screening Current   Cholesterol Screening Once every 5 years if you don't have a lipid disorder. May order more often based on risk factors. Lipid panel: 04/07/2021    Screening Current     Other Preventive Screenings Covered by Medicare:  Abdominal Aortic Aneurysm (AAA) Screening: covered once if your at risk. You're considered to be at risk if you have a family history of AAA.  Lung Cancer Screening: covers low dose CT scan once per year if you meet all of the following conditions: (1) Age 55-77; (2) No signs or symptoms of lung cancer; (3) Current smoker or have quit smoking within the last 15 years; (4) You have a tobacco smoking history of at least 20 pack years (packs per day multiplied by number of years you smoked); (5) You get a written order from a healthcare provider.  Glaucoma Screening: covered annually if you're considered high risk: (1) You have diabetes OR (2) Family history of glaucoma OR (3)  aged 50 and older OR (4)  American aged 65 and older  Osteoporosis Screening: covered every 2 years if you meet one of the following conditions: (1) You're estrogen deficient and at risk for osteoporosis based off medical history and other findings; (2) Have a vertebral abnormality; (3) On glucocorticoid therapy for more than 3 months; (4) Have primary hyperparathyroidism; (5) On osteoporosis medications and need to assess response to drug therapy.   Last bone density test (DXA Scan): 11/09/2021.  HIV Screening: covered annually if you're between the age of 15-65. Also covered annually if you are younger than 15 and older than 65 with risk factors for HIV infection. For pregnant patients, it is covered up to 3  times per pregnancy.    Immunizations:  Immunization Recommendations   Influenza Vaccine Annual influenza vaccination during flu season is recommended for all persons aged >= 6 months who do not have contraindications   Pneumococcal Vaccine   * Pneumococcal conjugate vaccine = PCV13 (Prevnar 13), PCV15 (Vaxneuvance), PCV20 (Prevnar 20)  * Pneumococcal polysaccharide vaccine = PPSV23 (Pneumovax) Adults 19-65 yo with certain risk factors or if 65+ yo  If never received any pneumonia vaccine: recommend Prevnar 20 (PCV20)  Give PCV20 if previously received 1 dose of PCV13 or PPSV23   Hepatitis B Vaccine 3 dose series if at intermediate or high risk (ex: diabetes, end stage renal disease, liver disease)   Respiratory syncytial virus (RSV) Vaccine - COVERED BY MEDICARE PART D  * RSVPreF3 (Arexvy) CDC recommends that adults 60 years of age and older may receive a single dose of RSV vaccine using shared clinical decision-making (SCDM)   Tetanus (Td) Vaccine - COST NOT COVERED BY MEDICARE PART B Following completion of primary series, a booster dose should be given every 10 years to maintain immunity against tetanus. Td may also be given as tetanus wound prophylaxis.   Tdap Vaccine - COST NOT COVERED BY MEDICARE PART B Recommended at least once for all adults. For pregnant patients, recommended with each pregnancy.   Shingles Vaccine (Shingrix) - COST NOT COVERED BY MEDICARE PART B  2 shot series recommended in those 19 years and older who have or will have weakened immune systems or those 50 years and older     Health Maintenance Due:      Topic Date Due   • Cervical Cancer Screening  Never done   • Breast Cancer Screening: Mammogram  09/11/2020   • Colorectal Cancer Screening  01/11/2026   • Hepatitis C Screening  Completed     Immunizations Due:      Topic Date Due   • Influenza Vaccine (1) 09/01/2023   • COVID-19 Vaccine (6 - 2023-24 season) 09/01/2023     Advance Directives   What are advance directives?  Advance  directives are legal documents that state your wishes and plans for medical care. These plans are made ahead of time in case you lose your ability to make decisions for yourself. Advance directives can apply to any medical decision, such as the treatments you want, and if you want to donate organs.   What are the types of advance directives?  There are many types of advance directives, and each state has rules about how to use them. You may choose a combination of any of the following:  Living will:  This is a written record of the treatment you want. You can also choose which treatments you do not want, which to limit, and which to stop at a certain time. This includes surgery, medicine, IV fluid, and tube feedings.   Durable power of  for healthcare (DPAHC):  This is a written record that states who you want to make healthcare choices for you when you are unable to make them for yourself. This person, called a proxy, is usually a family member or a friend. You may choose more than 1 proxy.  Do not resuscitate (DNR) order:  A DNR order is used in case your heart stops beating or you stop breathing. It is a request not to have certain forms of treatment, such as CPR. A DNR order may be included in other types of advance directives.  Medical directive:  This covers the care that you want if you are in a coma, near death, or unable to make decisions for yourself. You can list the treatments you want for each condition. Treatment may include pain medicine, surgery, blood transfusions, dialysis, IV or tube feedings, and a ventilator (breathing machine).  Values history:  This document has questions about your views, beliefs, and how you feel and think about life. This information can help others choose the care that you would choose.  Why are advance directives important?  An advance directive helps you control your care. Although spoken wishes may be used, it is better to have your wishes written down. Spoken  wishes can be misunderstood, or not followed. Treatments may be given even if you do not want them. An advance directive may make it easier for your family to make difficult choices about your care.       © Copyright [employer] 2018 Information is for End User's use only and may not be sold, redistributed or otherwise used for commercial purposes. All illustrations and images included in CareNotes® are the copyrighted property of [employer] or [employer]

## 2024-02-19 NOTE — PROGRESS NOTES
Assessment and Plan:     Problem List Items Addressed This Visit    None  Visit Diagnoses       Medicare annual wellness visit, initial    -  Primary           Preventive health issues were discussed with patient, and age appropriate screening tests were ordered as noted in patient's After Visit Summary.  Personalized health advice and appropriate referrals for health education or preventive services given if needed, as noted in patient's After Visit Summary.   History of Present Illness:   Patient presents for a Medicare Wellness Visit     Pt prevents for AWV  Denies any concerns that are new      Patient Care Team:  Rehab DO Nasima as PCP - General (Family Medicine)  [facility] as PCP - PCP-[facility] (RTE)  Solomon Kelly MD (Radiation Oncology)  Magdalena Willis MD (Breast Surgery)  Tae Brown DO (Inactive) (Pain Medicine)  Gagan Remy MD (Orthopedic Surgery)     Review of Systems:   Review of Systems   Constitutional:  Negative for chills and fever.   HENT:  Negative for ear pain and sore throat.    Eyes:  Negative for pain and visual disturbance.   Respiratory:  Negative for cough and shortness of breath.    Cardiovascular:  Negative for chest pain and palpitations.   Gastrointestinal:  Negative for abdominal pain and vomiting.   Genitourinary:  Negative for dysuria and hematuria.   Musculoskeletal:  Positive for arthralgias and back pain.   Skin:  Negative for color change and rash.   Neurological:  Negative for seizures and syncope.   All other systems reviewed and are negative.     Problem List:     Patient Active Problem List   Diagnosis    History of total hip arthroplasty    Malignant neoplasm of left breast in female, estrogen receptor positive     Osteopenia of spine    Other idiopathic scoliosis, thoracolumbar region    History of cancer of left breast    Lung nodule    Adjustment disorder with anxiety    Degenerative lumbar spinal stenosis     Piriformis syndrome    Complication of breast implant    Continuous opioid dependence (HCC)    Chronically on opiate therapy    Lymphedema of left arm      Past Medical and Surgical History:     Past Medical History:   Diagnosis Date    Breast cancer (HCC)     DJD (degenerative joint disease)     History of blood clots     Osteoarthritis     left hip    Osteoarthritis     Pain      Past Surgical History:   Procedure Laterality Date    BREAST IMPLANT Left 06/25/2019    Left breast tissue expander removal. open capsulectomy,gel implant insertion. Dr DA Willis    CATARACT EXTRACTION, BILATERAL Bilateral 7/21/21 8/4/21    MASTOPEXY Right 06/25/2019    Right mastopexy for symmetry    OVARY SURGERY      TOTAL HIP ARTHROPLASTY      US GUIDANCE BREAST BIOPSY LEFT EACH ADDITIONAL Left 9/19/2018    US GUIDANCE BREAST BIOPSY LEFT EACH ADDITIONAL Left 9/19/2018    US GUIDED BREAST BIOPSY LEFT COMPLETE Left 9/19/2018      Family History:     Family History   Problem Relation Age of Onset    Breast cancer Mother 42    Lymphoma Mother     Breast cancer Maternal Aunt       Social History:     Social History     Socioeconomic History    Marital status:      Spouse name: None    Number of children: None    Years of education: None    Highest education level: None   Occupational History    None   Tobacco Use    Smoking status: Never     Passive exposure: Never    Smokeless tobacco: Never   Vaping Use    Vaping status: Never Used   Substance and Sexual Activity    Alcohol use: No    Drug use: No    Sexual activity: None   Other Topics Concern    None   Social History Narrative    None     Social Determinants of Health     Financial Resource Strain: Low Risk  (2/19/2024)    Overall Financial Resource Strain (CARDIA)     Difficulty of Paying Living Expenses: Not hard at all   Food Insecurity: Food Insecurity Present (2/19/2024)    Hunger Vital Sign     Worried About Running Out of Food in the Last Year: Often true     Ran Out  of Food in the Last Year: Often true   Transportation Needs: Unmet Transportation Needs (2/19/2024)    PRAPARE - Transportation     Lack of Transportation (Medical): Yes     Lack of Transportation (Non-Medical): Yes   Physical Activity: Not on file   Stress: Not on file   Social Connections: Not on file   Intimate Partner Violence: Not on file   Housing Stability: Low Risk  (11/10/2021)    Housing Stability Vital Sign     Unable to Pay for Housing in the Last Year: No     Number of Places Lived in the Last Year: 1     Unstable Housing in the Last Year: No      Medications and Allergies:     Current Outpatient Medications   Medication Sig Dispense Refill    aspirin 325 mg tablet Take 325 mg by mouth as needed for mild pain      Calcium-Phosphorus-Vitamin D (CALCIUM GUMMIES PO) Take 1 capsule by mouth 3 (three) times a day      glycerin adult 2 g suppository Insert 1 suppository into the rectum as needed for constipation      Glycerin-Polysorbate 80 (REFRESH DRY EYE THERAPY OP) Apply 1 drop to eye      HYDROmorphone (DILAUDID) 4 mg tablet take 1 tablet (4MG)  by oral route  every 4 - 6 hours as needed      lidocaine (XYLOCAINE) 5 % ointment Apply topically as needed for mild pain 35.44 g 0    Morphine Sulfate ER 30 MG TBEA Take 1 tablet by mouth 3 (three) times a day      multivitamin (THERAGRAN) TABS Take 1 tablet by mouth daily       No current facility-administered medications for this visit.     Allergies   Allergen Reactions    Triamcinolone Rash and Blisters    Acetaminophen Other (See Comments)     Other reaction(s): temp paralysis, red flushing ski  paralysis    Clindamycin GI Intolerance and Other (See Comments)    Corticosteroids      Oral and IV    Ibuprofen Other (See Comments)     Severe headache    Lyrica [Pregabalin]     Penicillins Other (See Comments)    Prochlorperazine Other (See Comments)    Gabapentin Rash and Other (See Comments)     Difficultly breathing      Immunizations:     Immunization  History   Administered Date(s) Administered    COVID-19 MODERNA VACC 0.5 ML IM 03/22/2021, 04/21/2021, 11/11/2021, 06/02/2022    COVID-19 Moderna Vac BIVALENT 12 Yr+ IM 0.5 ML 12/05/2022    INFLUENZA 10/07/2008, 09/24/2009, 10/28/2013, 08/28/2016, 10/01/2020, 11/04/2021, 11/04/2021    Influenza Injectable, MDCK, Preservative Free, Quadrivalent, 0.5 mL 09/26/2019    Influenza Split 10/28/2013    Influenza, high dose seasonal 0.7 mL 10/19/2022    Influenza, injectable, quadrivalent, preservative free 0.5 mL 09/08/2018, 09/03/2020    Pneumococcal Polysaccharide PPV23 10/03/2016    Tdap 10/28/2013    Zoster Vaccine Recombinant 09/26/2019, 01/15/2020      Health Maintenance:         Topic Date Due    Cervical Cancer Screening  Never done    Breast Cancer Screening: Mammogram  09/11/2020    Colorectal Cancer Screening  01/11/2026    Hepatitis C Screening  Completed         Topic Date Due    Influenza Vaccine (1) 09/01/2023    COVID-19 Vaccine (6 - 2023-24 season) 09/01/2023      Medicare Screening Tests and Risk Assessments:     Judith is here for her Initial Wellness visit.     Health Risk Assessment:   Patient rates overall health as poor. Patient feels that their physical health rating is same. Patient is dissatisfied with their life. Eyesight was rated as slightly worse. Hearing was rated as same. Patient feels that their emotional and mental health rating is same. Patients states they are never, rarely angry. Patient states they are often unusually tired/fatigued. Pain experienced in the last 7 days has been a lot. Patient's pain rating has been 10/10. Patient states that she has experienced no weight loss or gain in last 6 months. Rides stationary bike four miles a day     Symx are chronic in nature    Fall Risk Screening:   In the past year, patient has experienced: no history of falling in past year      Urinary Incontinence Screening:   Patient has not leaked urine accidently in the last six months.     Home  Safety:  Patient has trouble with stairs inside or outside of their home. Patient has working smoke alarms and has working carbon monoxide detector. Home safety hazards include: none.     Nutrition:   Current diet is Regular. No junk food, low salt. Eats a lot of fresh vegetables     Medications:   Patient is currently taking over-the-counter supplements. OTC medications include: calcium, ashwaganda with melatonin,. Patient is able to manage medications.     Activities of Daily Living (ADLs)/Instrumental Activities of Daily Living (IADLs):   Walk and transfer into and out of bed and chair?: Yes  Dress and groom yourself?: No    Bathe or shower yourself?: No    Feed yourself? Yes  Do your laundry/housekeeping?: No  Manage your money, pay your bills and track your expenses?: Yes  Make your own meals?: No    Do your own shopping?: Yes    ADL comments: Has an aide and     Previous Hospitalizations:   Any hospitalizations or ED visits within the last 12 months?: No      Advance Care Planning:   Living will: No      Comments: Not interested at this time    PREVENTIVE SCREENINGS      Cardiovascular Screening:    General: Screening Current      Diabetes Screening:     General: Screening Current      Colorectal Cancer Screening:     General: Screening Current      Breast Cancer Screening:     General: History Breast Cancer      Cervical Cancer Screening:    General: Screening Not Indicated      Lung Cancer Screening:     General: Screening Not Indicated      Hepatitis C Screening:    General: Screening Current      Preventive Screening Comments: Declines further screening     Screening, Brief Intervention, and Referral to Treatment (SBIRT)    Screening      AUDIT-C Screenin) How often did you have a drink containing alcohol in the past year? never  2) How many drinks did you have on a typical day when you were drinking in the past year? 0  3) How often did you have 6 or more drinks on one occasion in the  "past year? never    AUDIT-C Score: 0  Interpretation: Score 0-2 (female): Negative screen for alcohol misuse    Single Item Drug Screening:  How often have you used an illegal drug (including marijuana) or a prescription medication for non-medical reasons in the past year? never    Single Item Drug Screen Score: 0  Interpretation: Negative screen for possible drug use disorder    Other Counseling Topics:   Calcium and vitamin D intake.     No results found.   Physical Exam:   /80 (BP Location: Left arm, Patient Position: Sitting, Cuff Size: Standard)   Pulse 65   Temp 97.7 °F (36.5 °C) (Temporal)   Resp 16   Ht 5' 3\" (1.6 m)   Wt 55.8 kg (123 lb)   LMP  (LMP Unknown)   SpO2 95%   Breastfeeding No   BMI 21.79 kg/m²     Physical Exam  Vitals and nursing note reviewed.   Constitutional:       General: She is not in acute distress.     Appearance: She is well-developed. She is not ill-appearing.   HENT:      Head: Normocephalic and atraumatic.   Eyes:      Conjunctiva/sclera: Conjunctivae normal.   Cardiovascular:      Rate and Rhythm: Normal rate and regular rhythm.      Heart sounds: No murmur heard.  Pulmonary:      Effort: Pulmonary effort is normal. No respiratory distress.      Breath sounds: Normal breath sounds.   Abdominal:      Palpations: Abdomen is soft.      Tenderness: There is no abdominal tenderness.   Musculoskeletal:         General: No swelling.      Cervical back: Neck supple.      Comments: PIP and DIP swelling.    Skin:     General: Skin is warm and dry.      Capillary Refill: Capillary refill takes less than 2 seconds.   Neurological:      Mental Status: She is alert.   Psychiatric:         Mood and Affect: Mood normal.     Jim Garcia MD  "

## 2024-03-01 NOTE — TELEPHONE ENCOUNTER
Patient called in wants to talk to a nurse regarding Medial branch block injection on March 14, asking if it's okay to stop Clopidogrel for 7 days and Xarelto for 3 days prior to injection . Please call her at [phone]    Pt last seen 06/05/19, next OV 11/13/19

## 2024-07-17 NOTE — ASSESSMENT & PLAN NOTE
From: Gabriel Sharma  To: Sukh Elliott MD  Sent: 7/15/2024 6:10 PM CDT  Subject: Kidney Stone    Dr. Elliott, Last week I had pain symptoms associated with a kidney stone. I am familiar with this pain from previous experiences. After the symptoms subsided, I passed the stone through my stoma. The stone had a staple-like object attached to it. I am very concerned as this doesn't seem normal. Please review the attachment. I saved the actual stone if it's needed for analysis. I would like a response as soon as possible. Thank you, Gabriel Sharma [phone]   S/p breast implant removal in January 2022 - site reconstructed   F/u plastic surgeon

## 2024-07-23 ENCOUNTER — TELEPHONE (OUTPATIENT)
Dept: LAB | Facility: HOSPITAL | Age: 69
End: 2024-07-23

## 2024-07-31 ENCOUNTER — APPOINTMENT (OUTPATIENT)
Dept: LAB | Facility: HOSPITAL | Age: 69
End: 2024-07-31
Payer: COMMERCIAL

## 2024-07-31 DIAGNOSIS — R74.01 TRANSAMINITIS: ICD-10-CM

## 2024-07-31 DIAGNOSIS — R76.8 HEPATITIS C ANTIBODY POSITIVE IN BLOOD: ICD-10-CM

## 2024-07-31 LAB
ALBUMIN SERPL BCG-MCNC: 4.2 G/DL (ref 3.5–5)
ALP SERPL-CCNC: 96 U/L (ref 34–104)
ALT SERPL W P-5'-P-CCNC: 61 U/L (ref 7–52)
AST SERPL W P-5'-P-CCNC: 60 U/L (ref 13–39)
BILIRUB DIRECT SERPL-MCNC: 0.3 MG/DL (ref 0–0.2)
BILIRUB SERPL-MCNC: 1.56 MG/DL (ref 0.2–1)
HAV IGM SER QL: ABNORMAL
HBV CORE IGM SER QL: ABNORMAL
HBV SURFACE AG SER QL: ABNORMAL
HCV AB SER QL: REACTIVE
PROT SERPL-MCNC: 7 G/DL (ref 6.4–8.4)

## 2024-07-31 PROCEDURE — 36415 COLL VENOUS BLD VENIPUNCTURE: CPT

## 2024-07-31 PROCEDURE — 80074 ACUTE HEPATITIS PANEL: CPT

## 2024-07-31 PROCEDURE — 80076 HEPATIC FUNCTION PANEL: CPT

## 2024-07-31 PROCEDURE — 87902 NFCT AGT GNTYP ALYS HEP C: CPT

## 2024-08-03 LAB — HCV GENTYP SERPL NAA+PROBE: NORMAL

## 2024-08-26 ENCOUNTER — TELEPHONE (OUTPATIENT)
Dept: FAMILY MEDICINE CLINIC | Facility: CLINIC | Age: 69
End: 2024-08-26

## 2024-10-16 ENCOUNTER — OFFICE VISIT (OUTPATIENT)
Dept: FAMILY MEDICINE CLINIC | Facility: CLINIC | Age: 69
End: 2024-10-16

## 2024-10-16 VITALS
HEART RATE: 71 BPM | SYSTOLIC BLOOD PRESSURE: 130 MMHG | RESPIRATION RATE: 16 BRPM | DIASTOLIC BLOOD PRESSURE: 70 MMHG | BODY MASS INDEX: 21.79 KG/M2 | TEMPERATURE: 97.7 F | OXYGEN SATURATION: 99 % | HEIGHT: 63 IN | WEIGHT: 123 LBS

## 2024-10-16 DIAGNOSIS — L98.9 SKIN LESION: Primary | ICD-10-CM

## 2024-10-16 DIAGNOSIS — C50.812 MALIGNANT NEOPLASM OF OVERLAPPING SITES OF LEFT BREAST IN FEMALE, ESTROGEN RECEPTOR POSITIVE (HCC): ICD-10-CM

## 2024-10-16 DIAGNOSIS — B18.2 CHRONIC HEPATITIS C WITHOUT HEPATIC COMA (HCC): ICD-10-CM

## 2024-10-16 DIAGNOSIS — R91.1 LUNG NODULE: ICD-10-CM

## 2024-10-16 DIAGNOSIS — M85.88 OSTEOPENIA OF SPINE: ICD-10-CM

## 2024-10-16 DIAGNOSIS — Z17.0 MALIGNANT NEOPLASM OF OVERLAPPING SITES OF LEFT BREAST IN FEMALE, ESTROGEN RECEPTOR POSITIVE (HCC): ICD-10-CM

## 2024-10-16 DIAGNOSIS — F43.22 ADJUSTMENT DISORDER WITH ANXIETY: ICD-10-CM

## 2024-10-16 DIAGNOSIS — F11.20 CONTINUOUS OPIOID DEPENDENCE (HCC): ICD-10-CM

## 2024-10-16 PROCEDURE — 3075F SYST BP GE 130 - 139MM HG: CPT | Performed by: FAMILY MEDICINE

## 2024-10-16 PROCEDURE — 99214 OFFICE O/P EST MOD 30 MIN: CPT | Performed by: FAMILY MEDICINE

## 2024-10-16 PROCEDURE — G2211 COMPLEX E/M VISIT ADD ON: HCPCS | Performed by: FAMILY MEDICINE

## 2024-10-16 PROCEDURE — 3078F DIAST BP <80 MM HG: CPT | Performed by: FAMILY MEDICINE

## 2024-10-16 NOTE — PROGRESS NOTES
Ambulatory Visit  Name: Judith Corral      : 1955      MRN: [id number]  Encounter Provider: Altagracia Del Real DO  Encounter Date: 10/16/2024   Encounter department: [facility]    Assessment & Plan  Skin lesion  On left side of nose - concerning since had radiation after breast cancer and never covered face  Referral Team booked her appt on  with [facility]  Orders:    Ambulatory Referral to Dermatology; Future    Chronic hepatitis C without hepatic coma (HCC)  Last HCV RNA >10 million copies in 2021   Did have repeat blood work in 2024 but not HCV RNA - patient wants to hold off on getting this done  Did agree to getting US abdomen to check for any effect on liver  Orders:    US abdomen complete; Future    Lung nodule  -Patient was supposed to have had repeat CT chest in 3 to 6 months from last CT which was in 2019 (6 mm calcified granuloma in LLL)  -Patient would rather talk to her radiation oncologist regarding the need to repeat or not - she is not ready to go for CT at this time         Osteopenia of spine  dexa scan on 21 - osteopenia  Send for repeat Dexa in early    Continue calcium +vitamin d         Adjustment disorder with anxiety  Has been completely weaned off xanax for the past 2 years  Developed coping mechanisms - declined hydroxyzine or any other anxiolytic/antidepressant - denies SI/HI         Continuous opioid dependence (HCC)  Gets hydromorphone and morphine from Pain Management         Malignant neoplasm of overlapping sites of left breast in female, estrogen receptor positive (HCC)  Follow-up oncologist as needed  Follow-up breast surgeon as needed    Mammogram right breast negative in 2024 - repeat in 2025 - may benefit from additional imaging due to density of breast - to discuss with GYN            History of Present Illness     Noticed perfectly round pimple on left side of nose two months  ago. When it opens up, gets clear discharge sometimes. Associated with itchiness and pain when it opens up.   Has history of breast cancer s/p radiation on the left side. They never protected her face so patient is worried.        History obtained from : patient  Review of Systems   Constitutional:  Negative for chills, fatigue, fever and unexpected weight change.   HENT:  Negative for congestion, ear pain, rhinorrhea and sore throat.    Eyes:  Negative for pain and visual disturbance.   Respiratory:  Negative for cough, chest tightness and shortness of breath.    Cardiovascular:  Negative for chest pain, palpitations and leg swelling.   Gastrointestinal:  Negative for abdominal pain, constipation, diarrhea, nausea and vomiting.   Genitourinary:  Negative for difficulty urinating, dysuria and urgency.   Musculoskeletal:  Negative for arthralgias and back pain.   Skin:  Negative for rash.        +pimple on left side of nose   Neurological:  Negative for dizziness, numbness and headaches.   Psychiatric/Behavioral:  Negative for behavioral problems and suicidal ideas. The patient is not nervous/anxious.      Medical History Reviewed by provider this encounter:       Current Outpatient Medications on File Prior to Visit   Medication Sig Dispense Refill    aspirin 325 mg tablet Take 325 mg by mouth as needed for mild pain      Calcium-Phosphorus-Vitamin D (CALCIUM GUMMIES PO) Take 1 capsule by mouth 3 (three) times a day      glycerin adult 2 g suppository Insert 1 suppository into the rectum as needed for constipation      Glycerin-Polysorbate 80 (REFRESH DRY EYE THERAPY OP) Apply 1 drop to eye      HYDROmorphone (DILAUDID) 4 mg tablet take 1 tablet (4MG)  by oral route  every 4 - 6 hours as needed      lidocaine (XYLOCAINE) 5 % ointment Apply topically as needed for mild pain 35.44 g 0    Morphine Sulfate ER 30 MG TBEA Take 1 tablet by mouth 3 (three) times a day      multivitamin (THERAGRAN) TABS Take 1 tablet by  "mouth daily       No current facility-administered medications on file prior to visit.          Objective     /90 (BP Location: Right arm, Patient Position: Sitting, Cuff Size: Standard)   Pulse 71   Temp 97.7 °F (36.5 °C) (Temporal)   Resp 16   Ht 5' 3\" (1.6 m)   Wt 55.8 kg (123 lb)   LMP  (LMP Unknown)   SpO2 99%   BMI 21.79 kg/m²     Physical Exam  Constitutional:       Appearance: She is well-developed.   HENT:      Head: Normocephalic and atraumatic.      Right Ear: External ear normal.      Left Ear: External ear normal.      Nose: Nose normal.   Eyes:      Conjunctiva/sclera: Conjunctivae normal.      Pupils: Pupils are equal, round, and reactive to light.   Cardiovascular:      Rate and Rhythm: Normal rate and regular rhythm.      Heart sounds: Normal heart sounds.   Pulmonary:      Effort: Pulmonary effort is normal.      Breath sounds: Normal breath sounds.   Abdominal:      General: Bowel sounds are normal.      Palpations: Abdomen is soft.   Musculoskeletal:         General: Normal range of motion.      Cervical back: Normal range of motion and neck supple.   Skin:     General: Skin is warm.          Neurological:      Mental Status: She is alert and oriented to person, place, and time.   Psychiatric:         Behavior: Behavior normal.         "

## 2024-10-16 NOTE — ASSESSMENT & PLAN NOTE
Last HCV RNA >10 million copies in August 2021   Did have repeat blood work in July 2024 but not HCV RNA - patient wants to hold off on getting this done  Did agree to getting US abdomen to check for any effect on liver  Orders:    US abdomen complete; Future

## 2024-10-27 PROBLEM — I89.0 LYMPHEDEMA OF EXTREMITY: Status: ACTIVE | Noted: 2023-09-05

## 2024-10-27 PROBLEM — I89.0 LYMPHEDEMA OF EXTREMITY: Status: RESOLVED | Noted: 2023-09-05 | Resolved: 2024-10-27

## 2024-10-28 NOTE — ASSESSMENT & PLAN NOTE
dexa scan on 11/9/21 - osteopenia  Send for repeat Dexa in early 2025   Continue calcium +vitamin d

## 2024-10-28 NOTE — ASSESSMENT & PLAN NOTE
Follow-up oncologist as needed  Follow-up breast surgeon as needed    Mammogram right breast negative in June 2024 - repeat in June 2025 - may benefit from additional imaging due to density of breast - to discuss with GYN          ORTHO POSTOP PROGRESS NOTE      Subjective:     Post-Operative Day: 1 Status Post right knee polyswap,   Systemic or Specific Complaints:No Complaints    Objective:     Patient Vitals for the past 24 hrs:   BP Temp Pulse Resp SpO2   08/04/17 0408 129/77 97.4 °F (36.3 °C) 62 18 100 %   08/03/17 2337 120/70 97.8 °F (36.6 °C) (!) 56 16 100 %   08/03/17 1942 134/89 97.9 °F (36.6 °C) (!) 59 16 100 %   08/03/17 1607 127/75 97.6 °F (36.4 °C) (!) 51 15 95 %   08/03/17 1509 149/86 - (!) 52 - -   08/03/17 1507 123/82 - (!) 55 - -   08/03/17 1451 137/84 97.3 °F (36.3 °C) 62 16 100 %   08/03/17 1348 111/59 97.6 °F (36.4 °C) (!) 55 14 95 %   08/03/17 1250 137/89 97.6 °F (36.4 °C) (!) 59 16 96 %   08/03/17 1215 139/75 - 63 17 100 %   08/03/17 1200 (!) 146/93 - 61 16 100 %   08/03/17 1145 142/69 - (!) 55 - 100 %   08/03/17 1130 127/69 - (!) 54 11 99 %   08/03/17 1121 - 97.7 °F (36.5 °C) - - -   08/03/17 1115 150/73 - (!) 51 13 100 %   08/03/17 1100 151/76 - (!) 55 10 100 %   08/03/17 1045 155/84 - (!) 53 12 100 %   08/03/17 1030 156/83 - 66 13 99 %   08/03/17 1020 (!) 147/94 - 63 8 100 %   08/03/17 1015 150/79 - 72 13 100 %   08/03/17 1010 - - 76 13 100 %   08/03/17 1009 - - 79 11 99 %   08/03/17 1008 154/85 97.7 °F (36.5 °C) 74 (!) 5 100 %   08/03/17 1007 154/85 - - - -       General: alert, cooperative, no distress, appears stated age   Wound: Wound clean and dry no evidence of infection. , No Erythema, No Edema, No Drainage and Wound Intact   Motion: Extension: Full Extension   DVT Exam: No evidence of DVT seen on physical exam.  Negative Neil's sign. No cords or calf tenderness. No significant calf/ankle edema.      Data Review:    Recent Results (from the past 24 hour(s))   CULTURE, BODY FLUID W GRAM STAIN    Collection Time: 08/03/17  9:03 AM   Result Value Ref Range    Special Requests: RIGHT KNEE FLUID     GRAM STAIN NO WBC'S SEEN      GRAM STAIN NO DEFINITE ORGANISM SEEN      Culture result: PENDING    HEMOGLOBIN Collection Time: 08/04/17  4:22 AM   Result Value Ref Range    HGB 11.1 (L) 11.5 - 16.0 g/dL   PROTHROMBIN TIME + INR    Collection Time: 08/04/17  4:22 AM   Result Value Ref Range    INR 1.1 0.9 - 1.1      Prothrombin time 11.0 9.0 - 36.5 sec   METABOLIC PANEL, BASIC    Collection Time: 08/04/17  4:22 AM   Result Value Ref Range    Sodium 143 136 - 145 mmol/L    Potassium 3.5 3.5 - 5.1 mmol/L    Chloride 109 (H) 97 - 108 mmol/L    CO2 26 21 - 32 mmol/L    Anion gap 8 5 - 15 mmol/L    Glucose 97 65 - 100 mg/dL    BUN 11 6 - 20 MG/DL    Creatinine 0.69 0.55 - 1.02 MG/DL    BUN/Creatinine ratio 16 12 - 20      GFR est AA >60 >60 ml/min/1.73m2    GFR est non-AA >60 >60 ml/min/1.73m2    Calcium 8.7 8.5 - 10.1 MG/DL         Assessment:      Status Post right knee polyswap. Doing well postoperatively. Continue to work on PT. Plan:     Continues current post-op course.   Plan to discharge to Home [facility]. when available, cleared by PT.

## 2024-11-06 ENCOUNTER — HOSPITAL ENCOUNTER (OUTPATIENT)
Dept: ULTRASOUND IMAGING | Facility: MEDICAL CENTER | Age: 69
Discharge: HOME | End: 2024-11-06
Payer: MEDICARE

## 2024-11-06 DIAGNOSIS — B18.2 CHRONIC HEPATITIS C WITHOUT HEPATIC COMA (HCC): ICD-10-CM

## 2024-11-06 PROCEDURE — 76700 US EXAM ABDOM COMPLETE: CPT

## 2024-11-11 ENCOUNTER — TELEPHONE (OUTPATIENT)
Dept: FAMILY MEDICINE CLINIC | Facility: CLINIC | Age: 69
End: 2024-11-11

## 2024-11-11 NOTE — TELEPHONE ENCOUNTER
Lab Report Cancer Diagnosis form received on 11/11/24  to be placed in PCP folder.     Forms to be delivered to PCP mailbox at assigned time.

## 2024-11-13 ENCOUNTER — OFFICE VISIT (OUTPATIENT)
Dept: FAMILY MEDICINE CLINIC | Facility: CLINIC | Age: 69
End: 2024-11-13

## 2024-11-13 VITALS
DIASTOLIC BLOOD PRESSURE: 88 MMHG | HEART RATE: 72 BPM | OXYGEN SATURATION: 98 % | HEIGHT: 63 IN | WEIGHT: 124 LBS | RESPIRATION RATE: 16 BRPM | SYSTOLIC BLOOD PRESSURE: 128 MMHG | BODY MASS INDEX: 21.97 KG/M2 | TEMPERATURE: 97.5 F

## 2024-11-13 DIAGNOSIS — C44.311 BASAL CELL CARCINOMA (BCC) OF LEFT SIDE OF NOSE: Primary | ICD-10-CM

## 2024-11-13 DIAGNOSIS — R91.1 LUNG NODULE: ICD-10-CM

## 2024-11-13 DIAGNOSIS — B18.2 CHRONIC HEPATITIS C WITHOUT HEPATIC COMA (HCC): ICD-10-CM

## 2024-11-13 DIAGNOSIS — M25.59 PAIN IN OTHER JOINT: ICD-10-CM

## 2024-11-13 PROCEDURE — 99214 OFFICE O/P EST MOD 30 MIN: CPT | Performed by: FAMILY MEDICINE

## 2024-11-13 PROCEDURE — 3074F SYST BP LT 130 MM HG: CPT | Performed by: FAMILY MEDICINE

## 2024-11-13 PROCEDURE — G2211 COMPLEX E/M VISIT ADD ON: HCPCS | Performed by: FAMILY MEDICINE

## 2024-11-13 PROCEDURE — 3079F DIAST BP 80-89 MM HG: CPT | Performed by: FAMILY MEDICINE

## 2024-11-13 NOTE — PROGRESS NOTES
Ambulatory Visit  Name: Judith Corral      : 1955      MRN: [id number]  Encounter Provider: Altagracia Del Real DO  Encounter Date: 2024   Encounter department: [facility]    Assessment & Plan  Basal cell carcinoma (BCC) of left side of nose  Diagnosed by biopsy in 2024  Scheduled with Mohs Surgeon on 11/15/24         Chronic hepatitis C without hepatic coma (HCC)    Last HCV RNA >10 million copies in 2021   Did have repeat blood work in 2024 but not HCV RNA  US abdomen results pending from 24      Orders:    Comprehensive metabolic panel; Future    CBC and differential; Future    Hepatitis C RNA, Quantitative PCR; Future    Pain in other joint  Must rule out autoimmune process    Orders:    DANIAL Screen w/ Reflex to Titer/Pattern; Future    RF Screen w/ Reflex to Titer; Future    Sedimentation rate, automated; Future    C-reactive protein; Future    Lung nodule  -Patient was supposed to have had repeat CT chest in 3 to 6 months from last CT which was in 2019 (6 mm calcified granuloma in LLL)  -Patient would rather talk to her radiation oncologist regarding the need to repeat or not - she is not ready to go for CT at this time                  History of Present Illness     Went to [facility] on [facility]24 and had biopsy of left nasal area. Results revealed basal cell carcinoma, infiltrative type.     Will wait to get the flu shot until after she sees Mohs Surgeon this Friday 11/15/24.    Joint pain in hands, knees and hips. Associated with swelling. Worried about autoimmune process. Would like to get blood work.            History obtained from : patient  Review of Systems   Constitutional:  Negative for chills, fatigue, fever and unexpected weight change.   HENT:  Negative for congestion, ear pain, rhinorrhea and sore throat.    Eyes:  Negative for pain and visual disturbance.   Respiratory:  Negative for cough, chest tightness  "and shortness of breath.    Cardiovascular:  Negative for chest pain, palpitations and leg swelling.   Gastrointestinal:  Negative for abdominal pain, constipation, diarrhea, nausea and vomiting.   Genitourinary:  Negative for difficulty urinating, dysuria and urgency.   Musculoskeletal:  Positive for arthralgias and joint swelling. Negative for back pain.   Skin:  Negative for rash.   Neurological:  Negative for dizziness, numbness and headaches.   Psychiatric/Behavioral:  Negative for behavioral problems and suicidal ideas. The patient is not nervous/anxious.      Medical History Reviewed by provider this encounter:       Current Outpatient Medications on File Prior to Visit   Medication Sig Dispense Refill    aspirin 325 mg tablet Take 325 mg by mouth as needed for mild pain      Calcium-Phosphorus-Vitamin D (CALCIUM GUMMIES PO) Take 1 capsule by mouth 3 (three) times a day      glycerin adult 2 g suppository Insert 1 suppository into the rectum as needed for constipation      Glycerin-Polysorbate 80 (REFRESH DRY EYE THERAPY OP) Apply 1 drop to eye      HYDROmorphone (DILAUDID) 4 mg tablet take 1 tablet (4MG)  by oral route  every 4 - 6 hours as needed      lidocaine (XYLOCAINE) 5 % ointment Apply topically as needed for mild pain 35.44 g 0    Morphine Sulfate ER 30 MG TBEA Take 1 tablet by mouth 3 (three) times a day      multivitamin (THERAGRAN) TABS Take 1 tablet by mouth daily       No current facility-administered medications on file prior to visit.          Objective     /88 (BP Location: Right arm, Patient Position: Sitting, Cuff Size: Standard)   Pulse 72   Temp 97.5 °F (36.4 °C) (Temporal)   Resp 16   Ht 5' 3\" (1.6 m)   Wt 56.2 kg (124 lb)   LMP  (LMP Unknown)   SpO2 98%   BMI 21.97 kg/m²     Physical Exam  Constitutional:       Appearance: She is well-developed.   HENT:      Head: Normocephalic and atraumatic.      Right Ear: External ear normal.      Left Ear: External ear normal.      " Nose: Nose normal.   Eyes:      Conjunctiva/sclera: Conjunctivae normal.      Pupils: Pupils are equal, round, and reactive to light.   Cardiovascular:      Rate and Rhythm: Normal rate and regular rhythm.      Heart sounds: Normal heart sounds.   Pulmonary:      Effort: Pulmonary effort is normal.      Breath sounds: Normal breath sounds.   Abdominal:      General: Bowel sounds are normal.      Palpations: Abdomen is soft.   Musculoskeletal:         General: Normal range of motion.      Right hand: Swelling and tenderness present.      Left hand: Swelling and tenderness present.      Cervical back: Normal range of motion and neck supple.   Skin:     General: Skin is warm.   Neurological:      Mental Status: She is alert and oriented to person, place, and time.   Psychiatric:         Behavior: Behavior normal.

## 2024-11-13 NOTE — ASSESSMENT & PLAN NOTE
Last HCV RNA >10 million copies in August 2021   Did have repeat blood work in July 2024 but not HCV RNA  US abdomen results pending from 11/6/24      Orders:    Comprehensive metabolic panel; Future    CBC and differential; Future    Hepatitis C RNA, Quantitative PCR; Future

## 2025-01-22 ENCOUNTER — OFFICE VISIT (OUTPATIENT)
Dept: FAMILY MEDICINE CLINIC | Facility: CLINIC | Age: 70
End: 2025-01-22

## 2025-01-22 VITALS
DIASTOLIC BLOOD PRESSURE: 74 MMHG | HEART RATE: 86 BPM | SYSTOLIC BLOOD PRESSURE: 118 MMHG | RESPIRATION RATE: 18 BRPM | TEMPERATURE: 97.6 F | OXYGEN SATURATION: 98 % | BODY MASS INDEX: 21.44 KG/M2 | WEIGHT: 121 LBS | HEIGHT: 63 IN

## 2025-01-22 DIAGNOSIS — C44.311 BASAL CELL CARCINOMA (BCC) OF LEFT SIDE OF NOSE: Primary | ICD-10-CM

## 2025-01-22 DIAGNOSIS — F11.20 CONTINUOUS OPIOID DEPENDENCE (HCC): ICD-10-CM

## 2025-01-22 DIAGNOSIS — I89.0 LYMPHEDEMA OF LEFT ARM: ICD-10-CM

## 2025-01-22 DIAGNOSIS — B18.2 CHRONIC HEPATITIS C WITHOUT HEPATIC COMA (HCC): ICD-10-CM

## 2025-01-22 DIAGNOSIS — R91.1 LUNG NODULE: ICD-10-CM

## 2025-01-22 DIAGNOSIS — F43.22 ADJUSTMENT DISORDER WITH ANXIETY: ICD-10-CM

## 2025-01-22 DIAGNOSIS — M85.88 OSTEOPENIA OF SPINE: ICD-10-CM

## 2025-01-22 PROCEDURE — 3074F SYST BP LT 130 MM HG: CPT | Performed by: FAMILY MEDICINE

## 2025-01-22 PROCEDURE — 99214 OFFICE O/P EST MOD 30 MIN: CPT | Performed by: FAMILY MEDICINE

## 2025-01-22 PROCEDURE — 3078F DIAST BP <80 MM HG: CPT | Performed by: FAMILY MEDICINE

## 2025-01-22 PROCEDURE — G2211 COMPLEX E/M VISIT ADD ON: HCPCS | Performed by: FAMILY MEDICINE

## 2025-01-22 RX ORDER — CEPHALEXIN 500 MG/1
500 CAPSULE ORAL EVERY 6 HOURS SCHEDULED
COMMUNITY
Start: 2024-11-20

## 2025-01-22 RX ORDER — MORPHINE SULFATE 30 MG/1
30 TABLET, FILM COATED, EXTENDED RELEASE ORAL 3 TIMES DAILY
COMMUNITY
Start: 2025-01-17

## 2025-01-22 NOTE — ASSESSMENT & PLAN NOTE
Last HCV RNA >10 million copies in August 2021   Did have repeat blood work in July 2024 but not HCV RNA  US abdomen showed mild hepatomegaly in November 2024  Strongly encouraged patient to get blood work done that had been ordered in November 2024

## 2025-01-22 NOTE — PROGRESS NOTES
Name: Judith Corral      : 1955      MRN: [id number]  Encounter Provider: Altagracia Del Real DO  Encounter Date: 2025   Encounter department: [facility]BE  :  Assessment & Plan  Basal cell carcinoma (BCC) of left side of nose  S/p two part procedure of left nasal basal cell carcinoma in November and 2024.   F/U [facility] 25       Continuous opioid dependence (HCC)  Gets hydromorphone and morphine from Pain Management         Adjustment disorder with anxiety  Has been completely weaned off xanax for the past 2 years  Developed coping mechanisms - declined hydroxyzine or any other anxiolytic/antidepressant - denies SI/HI         Lung nodule  -Patient was supposed to have had repeat CT chest in 3 to 6 months from last CT which was in 2019 (6 mm calcified granuloma in LLL)  -Patient would rather talk to her radiation oncologist regarding the need to repeat or not - she is not ready to go for CT at this time         Chronic hepatitis C without hepatic coma (HCC)  Last HCV RNA >10 million copies in 2021   Did have repeat blood work in 2024 but not HCV RNA  US abdomen showed mild hepatomegaly in 2024  Strongly encouraged patient to get blood work done that had been ordered in 2024       Lymphedema of left arm  Patient to look for lymphedema clinic - will be getting script from Pain Management       Osteopenia of spine  dexa scan on 21 - osteopenia  Continue calcium +vitamin d  Strongly recommended scheduling Dexa Scan which had been ordered in 2023 - Central Scheduling number provided in AVS              History of Present Illness   Had two part procedure of left nasal basal cell carcinoma in November and 2024. Goes back to see in 2025.      Review of Systems   Constitutional:  Negative for chills, fatigue, fever and unexpected weight change.   HENT:  Negative for  "congestion, ear pain, rhinorrhea and sore throat.    Eyes:  Negative for pain and visual disturbance.   Respiratory:  Negative for cough, chest tightness and shortness of breath.    Cardiovascular:  Negative for chest pain, palpitations and leg swelling.   Gastrointestinal:  Negative for abdominal pain, constipation, diarrhea, nausea and vomiting.   Genitourinary:  Negative for difficulty urinating, dysuria and urgency.   Musculoskeletal:  Negative for arthralgias and back pain.   Skin:  Negative for rash.   Neurological:  Negative for dizziness, numbness and headaches.   Psychiatric/Behavioral:  Negative for behavioral problems and suicidal ideas. The patient is not nervous/anxious.        Objective   /74 (BP Location: Left arm, Patient Position: Sitting, Cuff Size: Standard)   Pulse 86   Temp 97.6 °F (36.4 °C) (Temporal)   Resp 18   Ht 5' 3\" (1.6 m)   Wt 54.9 kg (121 lb)   LMP  (LMP Unknown)   SpO2 98%   BMI 21.43 kg/m²      Physical Exam  Constitutional:       Appearance: She is well-developed.   HENT:      Head: Normocephalic and atraumatic.      Right Ear: External ear normal.      Left Ear: External ear normal.      Nose: Nose normal.   Eyes:      Conjunctiva/sclera: Conjunctivae normal.      Pupils: Pupils are equal, round, and reactive to light.   Cardiovascular:      Rate and Rhythm: Normal rate and regular rhythm.      Heart sounds: Normal heart sounds.   Pulmonary:      Effort: Pulmonary effort is normal.      Breath sounds: Normal breath sounds.   Abdominal:      General: Bowel sounds are normal.      Palpations: Abdomen is soft.   Musculoskeletal:         General: Normal range of motion.      Cervical back: Normal range of motion and neck supple.   Skin:     General: Skin is warm.   Neurological:      Mental Status: She is alert and oriented to person, place, and time.   Psychiatric:         Behavior: Behavior normal.     "

## 2025-01-22 NOTE — ASSESSMENT & PLAN NOTE
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   F/U [facility] 2/24/25

## 2025-01-22 NOTE — ASSESSMENT & PLAN NOTE
dexa scan on 11/9/21 - osteopenia  Continue calcium +vitamin d  Strongly recommended scheduling Dexa Scan which had been ordered in June 2023 - Central Scheduling number provided in AVS

## 2025-01-22 NOTE — ASSESSMENT & PLAN NOTE
Has been completely weaned off xanax for the past 2 years  Developed coping mechanisms - declined hydroxyzine or any other anxiolytic/antidepressant - denies SI/HI

## 2025-04-03 ENCOUNTER — RA CDI HCC (OUTPATIENT)
Dept: OTHER | Facility: HOSPITAL | Age: 70
End: 2025-04-03

## 2025-04-17 ENCOUNTER — TELEPHONE (OUTPATIENT)
Dept: FAMILY MEDICINE CLINIC | Facility: CLINIC | Age: 70
End: 2025-04-17

## 2025-04-17 NOTE — TELEPHONE ENCOUNTER
PCP SIGNATURE NEEDED FOR [facility] FORM RECEIVED VIA FAX AND PLACED IN PCP FOLDER TO BE DELIVERED AT ASSIGNED TIMES.    Treatment agreement and consent for controlled substances containing an opioid medicine

## 2025-04-23 ENCOUNTER — OFFICE VISIT (OUTPATIENT)
Dept: FAMILY MEDICINE CLINIC | Facility: CLINIC | Age: 70
End: 2025-04-23

## 2025-04-23 VITALS
TEMPERATURE: 98 F | OXYGEN SATURATION: 98 % | BODY MASS INDEX: 21.62 KG/M2 | DIASTOLIC BLOOD PRESSURE: 78 MMHG | WEIGHT: 122 LBS | RESPIRATION RATE: 18 BRPM | HEART RATE: 85 BPM | HEIGHT: 63 IN | SYSTOLIC BLOOD PRESSURE: 136 MMHG

## 2025-04-23 DIAGNOSIS — B18.2 CHRONIC HEPATITIS C WITHOUT HEPATIC COMA (HCC): ICD-10-CM

## 2025-04-23 DIAGNOSIS — C50.812 MALIGNANT NEOPLASM OF OVERLAPPING SITES OF LEFT BREAST IN FEMALE, ESTROGEN RECEPTOR POSITIVE (HCC): ICD-10-CM

## 2025-04-23 DIAGNOSIS — R91.1 LUNG NODULE: ICD-10-CM

## 2025-04-23 DIAGNOSIS — Z17.0 MALIGNANT NEOPLASM OF OVERLAPPING SITES OF LEFT BREAST IN FEMALE, ESTROGEN RECEPTOR POSITIVE (HCC): ICD-10-CM

## 2025-04-23 DIAGNOSIS — G89.4 CHRONIC PAIN SYNDROME: ICD-10-CM

## 2025-04-23 DIAGNOSIS — Z00.00 ENCOUNTER FOR ANNUAL WELLNESS EXAM IN MEDICARE PATIENT: Primary | ICD-10-CM

## 2025-04-23 DIAGNOSIS — C44.311 BASAL CELL CARCINOMA (BCC) OF LEFT SIDE OF NOSE: ICD-10-CM

## 2025-04-23 PROCEDURE — 3078F DIAST BP <80 MM HG: CPT | Performed by: FAMILY MEDICINE

## 2025-04-23 PROCEDURE — 99214 OFFICE O/P EST MOD 30 MIN: CPT | Performed by: FAMILY MEDICINE

## 2025-04-23 PROCEDURE — G0439 PPPS, SUBSEQ VISIT: HCPCS | Performed by: FAMILY MEDICINE

## 2025-04-23 PROCEDURE — G2211 COMPLEX E/M VISIT ADD ON: HCPCS | Performed by: FAMILY MEDICINE

## 2025-04-23 PROCEDURE — 3075F SYST BP GE 130 - 139MM HG: CPT | Performed by: FAMILY MEDICINE

## 2025-04-23 RX ORDER — MAGNESIUM 30 MG
30 TABLET ORAL 2 TIMES DAILY
COMMUNITY

## 2025-04-23 NOTE — PROGRESS NOTES
Name: Judith Corral      : 1955      MRN: [id number]  Encounter Provider: Altagracia Del Real DO  Encounter Date: 2025   Encounter department: [facility] A[facility]  :  Assessment & Plan  Malignant neoplasm of overlapping sites of left breast in female, estrogen receptor positive (HCC)  Follow-up oncologist as needed  Follow-up breast surgeon as needed    Mammogram right breast negative in 2024 - repeat in 2025 - may benefit from additional imaging due to density of breast - to discuss with GYN      Orders:    Ambulatory referral to chronic care management; Future    Basal cell carcinoma (BCC) of left side of nose  S/p two part procedure of left nasal basal cell carcinoma in November and 2024.   Saw [facility] 25 - f/u in 2025         Lung nodule  -Patient was supposed to have had repeat CT chest in 3 to 6 months from last CT which was in 2019 (6 mm calcified granuloma in LLL)  -Patient would rather talk to her radiation oncologist regarding the need to repeat or not - she is not ready to go for CT at this kamari         Chronic hepatitis C without hepatic coma (HCC)  Last HCV RNA >10 million copies in 2021   Did have repeat blood work in 2024 but not HCV RNA  US abdomen showed mild hepatomegaly in 2024  Strongly encouraged patient to get blood work done that had been ordered in 2024       Encounter for annual wellness exam in Medicare patient         Chronic pain syndrome  F/U Pain Medicine  Continue pain medications              Preventive health issues were discussed with patient, and age appropriate screening tests were ordered as noted in patient's After Visit Summary. Personalized health advice and appropriate referrals for health education or preventive services given if needed, as noted in patient's After Visit Summary.    History of Present Illness     Saw [facility] Surgery in February 2025. Is to continue with postop care.     Saw Pain Medicine in March 2025. To continue the medications she is already on.    Had a living well drafted yesterday.        Patient Care Team:  Altagracia Del Real DO as PCP - General (Family Medicine)  [facility] as PCP - PCP-[facility] (RT[facility])  Solomon Kelly MD (Radiation Oncology)  Magdalena Willis MD (Breast Surgery)  Tae Brown DO (Inactive) (Pain Medicine)  Gagan Remy MD (Orthopedic Surgery)    Review of Systems   Constitutional:  Negative for chills, fatigue, fever and unexpected weight change.   HENT:  Negative for congestion, ear pain, rhinorrhea and sore throat.    Eyes:  Negative for pain and visual disturbance.   Respiratory:  Negative for cough, chest tightness and shortness of breath.    Cardiovascular:  Negative for chest pain, palpitations and leg swelling.   Gastrointestinal:  Negative for abdominal pain, constipation, diarrhea, nausea and vomiting.   Genitourinary:  Negative for difficulty urinating, dysuria and urgency.   Musculoskeletal:  Negative for arthralgias and back pain.   Skin:  Negative for rash.   Neurological:  Negative for dizziness, numbness and headaches.   Psychiatric/Behavioral:  Negative for behavioral problems and suicidal ideas. The patient is not nervous/anxious.      Medical History Reviewed by provider this encounter:       Annual Wellness Visit Questionnaire   Judith is here for her Subsequent Wellness visit. Last Medicare Wellness visit information reviewed, patient interviewed and updates made to the record today.      Health Risk Assessment:   Patient rates overall health as fair. Patient feels that their physical health rating is same. Patient is very satisfied with their life. Eyesight was rated as much better. Hearing was rated as same. Patient feels that their emotional and mental health rating is same. Patients states they are never,  rarely angry. Patient states they are often unusually tired/fatigued. Pain experienced in the last 7 days has been some. Patient's pain rating has been 4/10. Patient states that she has experienced no weight loss or gain in last 6 months.     Depression Screening:   PHQ-2 Score: 0      Fall Risk Screening:   In the past year, patient has experienced: no history of falling in past year      Urinary Incontinence Screening:   Patient has not leaked urine accidently in the last six months.     Home Safety:  Patient has trouble with stairs inside or outside of their home. Patient has working smoke alarms and has working carbon monoxide detector. Home safety hazards include: none.     Nutrition:   Current diet is Regular and No Added Salt.     Medications:   Patient is currently taking over-the-counter supplements. OTC medications include: see medication list. Patient is able to manage medications.     Activities of Daily Living (ADLs)/Instrumental Activities of Daily Living (IADLs):   Walk and transfer into and out of bed and chair?: Yes  Dress and groom yourself?: No    Bathe or shower yourself?: No    Feed yourself? Yes  Do your laundry/housekeeping?: No  Manage your money, pay your bills and track your expenses?: Yes  Make your own meals?: No    Do your own shopping?: No    Previous Hospitalizations:   Any hospitalizations or ED visits within the last 12 months?: Yes    How many hospitalizations have you had in the last year?: 1-2    Advance Care Planning:   Living will: Yes    Advanced directive: Yes    Advanced directive counseling given: Yes    ACP document given: Yes    Patient declined ACP directive: No      Preventive Screenings      Cardiovascular Screening:    General: Screening Current    Due for: Lipid Panel      Diabetes Screening:     General: Screening Current    Due for: Blood Glucose      Colorectal Cancer Screening:     General: Screening Current      Breast Cancer Screening:     General: History  Breast Cancer      Cervical Cancer Screening:    General: Screening Not Indicated      Osteoporosis Screening:      Due for: DXA Axial      Abdominal Aortic Aneurysm (AAA) Screening:        General: Screening Not Indicated      Lung Cancer Screening:     General: Screening Not Indicated      Hepatitis C Screening:    General: Screening Not Indicated and History Hepatitis C    Immunizations:  - Immunizations due: Hepatitis A and Hepatitis B    Screening, Brief Intervention, and Referral to Treatment (SBIRT)     Screening  Typical number of drinks in a day: 0  Typical number of drinks in a week: 0  Interpretation: Low risk drinking behavior.    Single Item Drug Screening:  How often have you used an illegal drug (including marijuana) or a prescription medication for non-medical reasons in the past year? never    Single Item Drug Screen Score: 0  Interpretation: Negative screen for possible drug use disorder    SDOH Risk Assessment  Social determinants of health (SDOH) risk assesment tool was completed. The tool at a minimum covered housing stability, food insecurity, transportation needs, and utility difficulty. Patient had at risk responses for the following SDOH domains: food insecurity.     Review of Current Opioid Use    Opioid Risk Tool (ORT) Interpretation: Complete Opioid Risk Tool (ORT)    Social Drivers of Health     Financial Resource Strain: Low Risk  (4/23/2025)    Overall Financial Resource Strain (CARDIA)     Difficulty of Paying Living Expenses: Not very hard   Food Insecurity: Food Insecurity Present (4/23/2025)    Hunger Vital Sign     Worried About Running Out of Food in the Last Year: Sometimes true     Ran Out of Food in the Last Year: Sometimes true   Transportation Needs: No Transportation Needs (4/23/2025)    PRAPARE - Transportation     Lack of Transportation (Medical): No     Lack of Transportation (Non-Medical): No   Housing Stability: Low Risk  (4/23/2025)    Housing Stability Vital Sign      "Unable to Pay for Housing in the Last Year: No     Number of Times Moved in the Last Year: 0     Homeless in the Last Year: No   Utilities: Not At Risk (4/23/2025)    [facility] Utilities     Threatened with loss of utilities: No     No results found.    Objective   /78 (BP Location: Right arm, Patient Position: Sitting, Cuff Size: Standard)   Pulse 85   Temp 98 °F (36.7 °C) (Temporal)   Resp 18   Ht 5' 3\" (1.6 m)   Wt 55.3 kg (122 lb)   LMP  (LMP Unknown)   SpO2 98%   BMI 21.61 kg/m²     Physical Exam  Constitutional:       Appearance: She is well-developed.   HENT:      Head: Normocephalic and atraumatic.      Right Ear: External ear normal.      Left Ear: External ear normal.      Nose: Nose normal.     Eyes:      Conjunctiva/sclera: Conjunctivae normal.      Pupils: Pupils are equal, round, and reactive to light.       Cardiovascular:      Rate and Rhythm: Normal rate and regular rhythm.      Heart sounds: Normal heart sounds.   Pulmonary:      Effort: Pulmonary effort is normal.      Breath sounds: Normal breath sounds.   Abdominal:      General: Bowel sounds are normal.      Palpations: Abdomen is soft.     Musculoskeletal:         General: Normal range of motion.      Cervical back: Normal range of motion and neck supple.     Skin:     General: Skin is warm.     Neurological:      Mental Status: She is alert and oriented to person, place, and time.     Psychiatric:         Behavior: Behavior normal.           "

## 2025-04-23 NOTE — PATIENT INSTRUCTIONS
Get the blood work that was ordered by Dr. Del Real in November 2024      Please bring a copy of living will to scan into your chart.

## 2025-04-25 ENCOUNTER — PATIENT OUTREACH (OUTPATIENT)
Dept: FAMILY MEDICINE CLINIC | Facility: CLINIC | Age: 70
End: 2025-04-25

## 2025-04-25 DIAGNOSIS — Z59.71 INSURANCE COVERAGE PROBLEMS: Primary | ICD-10-CM

## 2025-04-25 NOTE — TELEPHONE ENCOUNTER
The Controlled Substance Agreement was between patient and Torey Jones MD form [facility]. No one from our office needs to sign it. The agreement was placed in to be scanned bin. Thanks

## 2025-04-25 NOTE — PROGRESS NOTES
Chronic Care Management Program Consent:    Patient informed of availability of Chronic Care Management services. The services will billed monthly by their Primary Care Provider only. Patient is informed there may be a monthly cost sharing associated with the Chronic Care Management services. Patient is aware that financial counseling is available to assist with any co-pay questions or concerns.    Chronic Care Management services include:    24/7 access to care.  Comprehensive plan of care created by the provider.  Individualized care planning by the care manager(s).  Transitional care support.    The patient is informed that they have the right to stop Chronic Care Management services at anytime.       Patient consents to Chronic Care Management services? no    Patient informed that consent is needed only once unless the patient switches qualifying providers.

## 2025-04-25 NOTE — PROGRESS NOTES
Outpatient Care Management Note:    Re: chronic care management     I received referral and reviewed chart. Lary identified for chronic care management . She has history breast cancer L breast with mastectomy, basal cell l side nose , hip replacement , scoliosis , and follows pain management .  I called Judith and introduced myself and provided my name, role and contact information .  I explained chronic care management program . She states that a this time she is managing her care  and declines RN services.   She did ask for assistance with insurance coverage and SSI . She states that  her mother passed away and she inherited some money . She has [employer] medicare and has Medicaid . She received a letter stating that she might lose medicaid and needs to reapply . She also is considered disabled and receives SSI . She is concerned she will lose her SSI  disability due to inheritance .  She  requested assistance with  reapplying for medicaid and SSI . She is agreeable to  referral .

## 2025-04-28 ENCOUNTER — TELEPHONE (OUTPATIENT)
Dept: LAB | Facility: HOSPITAL | Age: 70
End: 2025-04-28

## 2025-04-28 ENCOUNTER — PATIENT OUTREACH (OUTPATIENT)
Dept: FAMILY MEDICINE CLINIC | Facility: CLINIC | Age: 70
End: 2025-04-28

## 2025-04-28 NOTE — PROGRESS NOTES
OP JAVAN had received a referral from VAL Polanco r/t insurance coverage problems. OP JAVAN had completed a chart review. Per chart, patient has [employer] Medicare as primary insurance. Per chart, patient has [employer] as secondary insurance. Per chart, patient inherited money from mom who passes away. Per chart, patient concerned about losing her insurance and SSI.    OP JAVAN had contacted the patient via phone. Patient told OP JAVAN that she would call her back. OP JAVAN agreed. OP JAVAN noted if no call back later on then will attempt to call again at a later date and time. OP JAVAN will continue to be available.

## 2025-05-02 ENCOUNTER — APPOINTMENT (OUTPATIENT)
Dept: LAB | Facility: HOSPITAL | Age: 70
End: 2025-05-02
Attending: FAMILY MEDICINE
Payer: MEDICARE

## 2025-05-02 DIAGNOSIS — M25.59 PAIN IN OTHER JOINT: ICD-10-CM

## 2025-05-02 DIAGNOSIS — B18.2 CHRONIC HEPATITIS C WITHOUT HEPATIC COMA (HCC): ICD-10-CM

## 2025-05-02 LAB
ALBUMIN SERPL BCG-MCNC: 4.4 G/DL (ref 3.5–5)
ALP SERPL-CCNC: 104 U/L (ref 34–104)
ALT SERPL W P-5'-P-CCNC: 60 U/L (ref 7–52)
ANION GAP SERPL CALCULATED.3IONS-SCNC: 6 MMOL/L (ref 4–13)
AST SERPL W P-5'-P-CCNC: 53 U/L (ref 13–39)
BASOPHILS # BLD AUTO: 0.03 THOUSANDS/ÂΜL (ref 0–0.1)
BASOPHILS NFR BLD AUTO: 1 % (ref 0–1)
BILIRUB SERPL-MCNC: 1.2 MG/DL (ref 0.2–1)
BUN SERPL-MCNC: 17 MG/DL (ref 5–25)
CALCIUM SERPL-MCNC: 9.4 MG/DL (ref 8.4–10.2)
CHLORIDE SERPL-SCNC: 102 MMOL/L (ref 96–108)
CO2 SERPL-SCNC: 31 MMOL/L (ref 21–32)
CREAT SERPL-MCNC: 0.66 MG/DL (ref 0.6–1.3)
CRP SERPL QL: <1 MG/L
EOSINOPHIL # BLD AUTO: 0.08 THOUSAND/ÂΜL (ref 0–0.61)
EOSINOPHIL NFR BLD AUTO: 2 % (ref 0–6)
ERYTHROCYTE [DISTWIDTH] IN BLOOD BY AUTOMATED COUNT: 12.3 % (ref 11.6–15.1)
ERYTHROCYTE [SEDIMENTATION RATE] IN BLOOD: 1 MM/HOUR (ref 0–29)
GFR SERPL CREATININE-BSD FRML MDRD: 90 ML/MIN/1.73SQ M
GLUCOSE SERPL-MCNC: 105 MG/DL (ref 65–140)
HCT VFR BLD AUTO: 47.1 % (ref 34.8–46.1)
HGB BLD-MCNC: 15.5 G/DL (ref 11.5–15.4)
IMM GRANULOCYTES # BLD AUTO: 0.02 THOUSAND/UL (ref 0–0.2)
IMM GRANULOCYTES NFR BLD AUTO: 0 % (ref 0–2)
LYMPHOCYTES # BLD AUTO: 1.55 THOUSANDS/ÂΜL (ref 0.6–4.47)
LYMPHOCYTES NFR BLD AUTO: 28 % (ref 14–44)
MCH RBC QN AUTO: 30.5 PG (ref 26.8–34.3)
MCHC RBC AUTO-ENTMCNC: 32.9 G/DL (ref 31.4–37.4)
MCV RBC AUTO: 93 FL (ref 82–98)
MONOCYTES # BLD AUTO: 0.59 THOUSAND/ÂΜL (ref 0.17–1.22)
MONOCYTES NFR BLD AUTO: 11 % (ref 4–12)
NEUTROPHILS # BLD AUTO: 3.18 THOUSANDS/ÂΜL (ref 1.85–7.62)
NEUTS SEG NFR BLD AUTO: 58 % (ref 43–75)
NRBC BLD AUTO-RTO: 0 /100 WBCS
PLATELET # BLD AUTO: 175 THOUSANDS/UL (ref 149–390)
PMV BLD AUTO: 10.6 FL (ref 8.9–12.7)
POTASSIUM SERPL-SCNC: 4.3 MMOL/L (ref 3.5–5.3)
PROT SERPL-MCNC: 7 G/DL (ref 6.4–8.4)
RBC # BLD AUTO: 5.08 MILLION/UL (ref 3.81–5.12)
RHEUMATOID FACT SERPL-ACNC: <10 IU/ML
SODIUM SERPL-SCNC: 139 MMOL/L (ref 135–147)
WBC # BLD AUTO: 5.45 THOUSAND/UL (ref 4.31–10.16)

## 2025-05-02 PROCEDURE — 86431 RHEUMATOID FACTOR QUANT: CPT

## 2025-05-02 PROCEDURE — 86225 DNA ANTIBODY NATIVE: CPT

## 2025-05-02 PROCEDURE — 86140 C-REACTIVE PROTEIN: CPT

## 2025-05-02 PROCEDURE — 86038 ANTINUCLEAR ANTIBODIES: CPT

## 2025-05-02 PROCEDURE — 80053 COMPREHEN METABOLIC PANEL: CPT

## 2025-05-02 PROCEDURE — 85025 COMPLETE CBC W/AUTO DIFF WBC: CPT

## 2025-05-02 PROCEDURE — 85652 RBC SED RATE AUTOMATED: CPT

## 2025-05-02 PROCEDURE — 36415 COLL VENOUS BLD VENIPUNCTURE: CPT

## 2025-05-04 LAB
DSDNA IGG SERPL IA-ACNC: 2.6 IU/ML (ref ?–15)
NUCLEAR IGG SER IA-RTO: 0.5 RATIO (ref ?–1)

## 2025-05-05 ENCOUNTER — PATIENT OUTREACH (OUTPATIENT)
Dept: FAMILY MEDICINE CLINIC | Facility: CLINIC | Age: 70
End: 2025-05-05

## 2025-05-05 NOTE — PROGRESS NOTES
OP JAVAN had contacted the patient. OP SW introduced herself and reviewed the reason for the consultation. OP SW also explained her role in the process. Patient understood the information provided.    Patient reported she resides alone. Patient reported family and friends for support. Patient reported she has SSI for income. Patient denied any food, housing, or utility issues at this time., Patient denied any transportation issues at this time.    Patient stated she is not sure how to navigate her inheritance from mom. Patient stated she received letter from welfare regarding SNAP ending due to income. Patient stated she also has letter regarding renewal of [employer]. Patient stated she did not renew because she did not think she would qualify. OP SW suggested patient still try to renew as  Assistance Office makes determination of benefits. Patient agreed to do so.    Patient confused about [employer] Medicare plan. Patient stated she called Medicare regarding plan. Patient informed OP SW that she can apply for additional coverage if [place] is not approved due to income. OP SW agreed that this is correct.    Patient noted she has approved waiver services. Patient also noted she has  through [employer]. Patient stated her  told her to renew her MA as well. Patient has aide that comes out 2x per week.     Patient concerned she may not have coverage for waiver services to continue. OP SW advised patient speak with [facility] agency regarding other options. OP SW informed patient that she may have to pay out of pocket for care. Patient plans to contact agency today.    Patient expressed being overwhelmed. OP SW suggested patient speak with aide when she comes out to home for further assistance. Patient agreed to do so. Patient stated she also has to speak with her .      Per chart, there are no h/o MH. Per chart, there are no h/o [facility]. Patient  did not report any additional needs at this time.    OP SW had provided her contact information. OP SW advised patient reach out as needed. Patient agreed. OP SW noted this a one-time outreach. OP SW closed referral. Please reconsult as needed.

## 2025-05-15 PROBLEM — G89.4 CHRONIC PAIN SYNDROME: Status: ACTIVE | Noted: 2025-05-15

## 2025-05-15 NOTE — ASSESSMENT & PLAN NOTE
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   Saw [facility] 2/24/25 - f/u in June 2025

## 2025-05-15 NOTE — ASSESSMENT & PLAN NOTE
Follow-up oncologist as needed  Follow-up breast surgeon as needed    Mammogram right breast negative in June 2024 - repeat in June 2025 - may benefit from additional imaging due to density of breast - to discuss with GYN      Orders:    Ambulatory referral to chronic care management; Future

## 2025-05-15 NOTE — ASSESSMENT & PLAN NOTE
-Patient was supposed to have had repeat CT chest in 3 to 6 months from last CT which was in October 2019 (6 mm calcified granuloma in LLL)  -Patient would rather talk to her radiation oncologist regarding the need to repeat or not - she is not ready to go for CT at this kamari

## 2025-05-16 ENCOUNTER — RESULTS FOLLOW-UP (OUTPATIENT)
Dept: FAMILY MEDICINE CLINIC | Facility: CLINIC | Age: 70
End: 2025-05-16

## 2025-06-13 ENCOUNTER — TELEPHONE (OUTPATIENT)
Dept: FAMILY MEDICINE CLINIC | Facility: CLINIC | Age: 70
End: 2025-06-13

## 2025-06-13 NOTE — TELEPHONE ENCOUNTER
Pt called and is asking for a Urine test to be put in for her. She just had covid and it took a lot out of her and now she has a sense of urgency and pain while urinating. She said no discharge or anything.     Please advise?

## 2025-06-16 NOTE — TELEPHONE ENCOUNTER
Is patient still having symptoms? If so, she should really come in for an evaluation. Please schedule her with any available provider today. Thanks

## 2025-07-11 ENCOUNTER — OFFICE VISIT (OUTPATIENT)
Dept: FAMILY MEDICINE CLINIC | Facility: CLINIC | Age: 70
End: 2025-07-11

## 2025-07-11 VITALS
DIASTOLIC BLOOD PRESSURE: 80 MMHG | SYSTOLIC BLOOD PRESSURE: 128 MMHG | HEART RATE: 71 BPM | OXYGEN SATURATION: 98 % | TEMPERATURE: 97.6 F | BODY MASS INDEX: 21.23 KG/M2 | RESPIRATION RATE: 16 BRPM | WEIGHT: 119.8 LBS | HEIGHT: 63 IN

## 2025-07-11 DIAGNOSIS — N39.0 COMPLICATED UTI (URINARY TRACT INFECTION): Primary | ICD-10-CM

## 2025-07-11 DIAGNOSIS — R30.0 DYSURIA: ICD-10-CM

## 2025-07-11 LAB
SL AMB  POCT GLUCOSE, UA: NORMAL
SL AMB LEUKOCYTE ESTERASE,UA: NORMAL
SL AMB POCT BILIRUBIN,UA: NORMAL
SL AMB POCT BLOOD,UA: NORMAL
SL AMB POCT CLARITY,UA: NORMAL
SL AMB POCT COLOR,UA: NORMAL
SL AMB POCT KETONES,UA: NORMAL
SL AMB POCT NITRITE,UA: NORMAL
SL AMB POCT PH,UA: 6
SL AMB POCT SPECIFIC GRAVITY,UA: 1.03
SL AMB POCT URINE PROTEIN: NORMAL
SL AMB POCT UROBILINOGEN: NORMAL

## 2025-07-11 PROCEDURE — 87086 URINE CULTURE/COLONY COUNT: CPT | Performed by: FAMILY MEDICINE

## 2025-07-11 PROCEDURE — 3074F SYST BP LT 130 MM HG: CPT | Performed by: FAMILY MEDICINE

## 2025-07-11 PROCEDURE — G2211 COMPLEX E/M VISIT ADD ON: HCPCS | Performed by: FAMILY MEDICINE

## 2025-07-11 PROCEDURE — 99213 OFFICE O/P EST LOW 20 MIN: CPT | Performed by: FAMILY MEDICINE

## 2025-07-11 PROCEDURE — 3079F DIAST BP 80-89 MM HG: CPT | Performed by: FAMILY MEDICINE

## 2025-07-11 PROCEDURE — 81002 URINALYSIS NONAUTO W/O SCOPE: CPT | Performed by: FAMILY MEDICINE

## 2025-07-11 PROCEDURE — 87186 SC STD MICRODIL/AGAR DIL: CPT | Performed by: FAMILY MEDICINE

## 2025-07-11 PROCEDURE — 87077 CULTURE AEROBIC IDENTIFY: CPT | Performed by: FAMILY MEDICINE

## 2025-07-11 RX ORDER — SULFAMETHOXAZOLE AND TRIMETHOPRIM 800; 160 MG/1; MG/1
1 TABLET ORAL 2 TIMES DAILY
Qty: 14 TABLET | Refills: 0 | Status: SHIPPED | OUTPATIENT
Start: 2025-07-11 | End: 2025-07-18

## 2025-07-13 LAB — BACTERIA UR CULT: ABNORMAL

## 2025-07-14 ENCOUNTER — RESULTS FOLLOW-UP (OUTPATIENT)
Dept: FAMILY MEDICINE CLINIC | Facility: CLINIC | Age: 70
End: 2025-07-14

## 2025-07-15 ENCOUNTER — TELEPHONE (OUTPATIENT)
Dept: FAMILY MEDICINE CLINIC | Facility: CLINIC | Age: 70
End: 2025-07-15